# Patient Record
Sex: MALE | Race: WHITE | NOT HISPANIC OR LATINO | Employment: OTHER | URBAN - METROPOLITAN AREA
[De-identification: names, ages, dates, MRNs, and addresses within clinical notes are randomized per-mention and may not be internally consistent; named-entity substitution may affect disease eponyms.]

---

## 2023-02-27 PROBLEM — I25.10 CAD S/P PERCUTANEOUS CORONARY ANGIOPLASTY: Status: ACTIVE | Noted: 2023-02-27

## 2023-02-27 PROBLEM — I63.9 CVA (CEREBRAL VASCULAR ACCIDENT) (MULTI): Status: ACTIVE | Noted: 2023-02-27

## 2023-02-27 PROBLEM — E66.9 CLASS 1 OBESITY WITH BODY MASS INDEX (BMI) OF 32.0 TO 32.9 IN ADULT: Status: ACTIVE | Noted: 2023-02-27

## 2023-02-27 PROBLEM — I10 BENIGN ESSENTIAL HTN: Status: ACTIVE | Noted: 2023-02-27

## 2023-02-27 PROBLEM — M10.9 GOUT: Status: ACTIVE | Noted: 2023-02-27

## 2023-02-27 PROBLEM — E66.811 CLASS 1 OBESITY WITH BODY MASS INDEX (BMI) OF 32.0 TO 32.9 IN ADULT: Status: ACTIVE | Noted: 2023-02-27

## 2023-02-27 PROBLEM — Z98.61 CAD S/P PERCUTANEOUS CORONARY ANGIOPLASTY: Status: ACTIVE | Noted: 2023-02-27

## 2023-02-27 RX ORDER — ATORVASTATIN CALCIUM 80 MG/1
80 TABLET, FILM COATED ORAL NIGHTLY
COMMUNITY
End: 2023-05-25 | Stop reason: SDUPTHER

## 2023-02-27 RX ORDER — BLOOD SUGAR DIAGNOSTIC
STRIP MISCELLANEOUS
COMMUNITY
End: 2023-04-19 | Stop reason: ALTCHOICE

## 2023-02-27 RX ORDER — LISINOPRIL 20 MG/1
20 TABLET ORAL DAILY
COMMUNITY
End: 2023-04-19 | Stop reason: ALTCHOICE

## 2023-02-27 RX ORDER — METOPROLOL SUCCINATE 50 MG/1
50 TABLET, EXTENDED RELEASE ORAL DAILY
COMMUNITY
End: 2023-05-25 | Stop reason: SDUPTHER

## 2023-02-27 RX ORDER — NAPROXEN SODIUM 220 MG/1
TABLET, FILM COATED ORAL
COMMUNITY

## 2023-02-27 RX ORDER — BLOOD-GLUCOSE CONTROL, NORMAL
EACH MISCELLANEOUS
COMMUNITY
End: 2023-04-19 | Stop reason: ALTCHOICE

## 2023-03-01 LAB — CHOLESTEROL IN LDL (MG/DL) IN SER/PLAS BY DIRECT ASSAY: 65 MG/DL (ref 0–129)

## 2023-04-19 ENCOUNTER — OFFICE VISIT (OUTPATIENT)
Dept: PRIMARY CARE | Facility: CLINIC | Age: 63
End: 2023-04-19
Payer: MEDICAID

## 2023-04-19 VITALS
DIASTOLIC BLOOD PRESSURE: 87 MMHG | HEIGHT: 72 IN | OXYGEN SATURATION: 96 % | BODY MASS INDEX: 32.64 KG/M2 | SYSTOLIC BLOOD PRESSURE: 166 MMHG | HEART RATE: 75 BPM | WEIGHT: 241 LBS

## 2023-04-19 DIAGNOSIS — E66.9 CLASS 1 OBESITY WITHOUT SERIOUS COMORBIDITY WITH BODY MASS INDEX (BMI) OF 32.0 TO 32.9 IN ADULT, UNSPECIFIED OBESITY TYPE: ICD-10-CM

## 2023-04-19 DIAGNOSIS — Z98.61 CAD S/P PERCUTANEOUS CORONARY ANGIOPLASTY: ICD-10-CM

## 2023-04-19 DIAGNOSIS — M10.9 GOUT OF KNEE, UNSPECIFIED CAUSE, UNSPECIFIED CHRONICITY, UNSPECIFIED LATERALITY: ICD-10-CM

## 2023-04-19 DIAGNOSIS — E55.9 VITAMIN D DEFICIENCY: ICD-10-CM

## 2023-04-19 DIAGNOSIS — Z12.5 ENCOUNTER FOR SCREENING FOR MALIGNANT NEOPLASM OF PROSTATE: ICD-10-CM

## 2023-04-19 DIAGNOSIS — I10 BENIGN ESSENTIAL HTN: Primary | ICD-10-CM

## 2023-04-19 DIAGNOSIS — I25.10 CAD S/P PERCUTANEOUS CORONARY ANGIOPLASTY: ICD-10-CM

## 2023-04-19 DIAGNOSIS — I63.9 CEREBROVASCULAR ACCIDENT (CVA), UNSPECIFIED MECHANISM (MULTI): ICD-10-CM

## 2023-04-19 PROCEDURE — 99214 OFFICE O/P EST MOD 30 MIN: CPT | Performed by: NURSE PRACTITIONER

## 2023-04-19 PROCEDURE — 1036F TOBACCO NON-USER: CPT | Performed by: NURSE PRACTITIONER

## 2023-04-19 PROCEDURE — 3079F DIAST BP 80-89 MM HG: CPT | Performed by: NURSE PRACTITIONER

## 2023-04-19 PROCEDURE — 3008F BODY MASS INDEX DOCD: CPT | Performed by: NURSE PRACTITIONER

## 2023-04-19 PROCEDURE — 3077F SYST BP >= 140 MM HG: CPT | Performed by: NURSE PRACTITIONER

## 2023-04-19 RX ORDER — LISINOPRIL 40 MG/1
40 TABLET ORAL DAILY
COMMUNITY
Start: 2023-04-05 | End: 2023-05-03 | Stop reason: SDUPTHER

## 2023-04-19 ASSESSMENT — ENCOUNTER SYMPTOMS
VOMITING: 0
NUMBNESS: 0
COUGH: 0
PSYCHIATRIC NEGATIVE: 1
FEVER: 0
SHORTNESS OF BREATH: 0
MUSCULOSKELETAL NEGATIVE: 1
ABDOMINAL PAIN: 0
CHILLS: 0
DIARRHEA: 0
DIZZINESS: 0
HEADACHES: 0
WEAKNESS: 0
NAUSEA: 0
SORE THROAT: 0
FATIGUE: 0
CONSTIPATION: 0
PALPITATIONS: 0

## 2023-04-19 ASSESSMENT — PATIENT HEALTH QUESTIONNAIRE - PHQ9
1. LITTLE INTEREST OR PLEASURE IN DOING THINGS: NOT AT ALL
SUM OF ALL RESPONSES TO PHQ9 QUESTIONS 1 AND 2: 0
2. FEELING DOWN, DEPRESSED OR HOPELESS: NOT AT ALL

## 2023-04-19 NOTE — ASSESSMENT & PLAN NOTE
Continue lisinopril 40 mg daily  Monitor and record blood pressure at home, call office with readings consistently greater than 135/85.  Reduce dietary sodium

## 2023-04-19 NOTE — PROGRESS NOTES
Subjective   Patient ID: Jg Starr is a 62 y.o. male who presents follow up.      HPI   Patient here today for follow-up visit  Has a history of CAD with PCI and CVA.   Staying active at home doing yard work.  Denies chest pain, SOB, palpitations, dizziness,  or GI issues.  Taking all medications as prescribed.        Review of Systems   Constitutional:  Negative for chills, fatigue and fever.   HENT:  Negative for congestion, ear pain and sore throat.    Eyes:  Negative for visual disturbance.   Respiratory:  Negative for cough and shortness of breath.    Cardiovascular:  Negative for chest pain, palpitations and leg swelling.   Gastrointestinal:  Negative for abdominal pain, constipation, diarrhea, nausea and vomiting.   Genitourinary: Negative.    Musculoskeletal: Negative.    Skin:  Negative for rash.   Neurological:  Negative for dizziness, weakness, numbness and headaches.   Psychiatric/Behavioral: Negative.         Objective   /87   Pulse 75   Ht 1.829 m (6')   Wt 109 kg (241 lb)   SpO2 96%   BMI 32.69 kg/m²     Physical Exam  Constitutional:       General: He is not in acute distress.     Appearance: Normal appearance.   HENT:      Head: Normocephalic and atraumatic.      Right Ear: Tympanic membrane, ear canal and external ear normal.      Left Ear: Tympanic membrane, ear canal and external ear normal.      Nose: Nose normal.      Mouth/Throat:      Mouth: Mucous membranes are moist.      Pharynx: Oropharynx is clear.   Eyes:      Extraocular Movements: Extraocular movements intact.      Conjunctiva/sclera: Conjunctivae normal.      Pupils: Pupils are equal, round, and reactive to light.   Cardiovascular:      Rate and Rhythm: Normal rate and regular rhythm.      Pulses: Normal pulses.      Heart sounds: Normal heart sounds. No murmur heard.  Pulmonary:      Effort: Pulmonary effort is normal.      Breath sounds: Normal breath sounds. No wheezing, rhonchi or rales.   Abdominal:       General: Bowel sounds are normal.      Palpations: Abdomen is soft.      Tenderness: There is no abdominal tenderness.   Musculoskeletal:         General: Normal range of motion.      Cervical back: Normal range of motion and neck supple.   Lymphadenopathy:      Comments: No lymphadenopathy noted   Skin:     General: Skin is warm and dry.      Findings: No rash.   Neurological:      General: No focal deficit present.      Mental Status: He is alert and oriented to person, place, and time.      Cranial Nerves: No cranial nerve deficit.      Coordination: Coordination normal.      Gait: Gait normal.   Psychiatric:         Mood and Affect: Mood normal.         Behavior: Behavior normal.         Assessment/Plan   Problem List Items Addressed This Visit          Nervous    CVA (cerebral vascular accident) (CMS/[facility])       Circulatory    Benign essential HTN - Primary     Continue lisinopril 40 mg daily  Monitor and record blood pressure at home, call office with readings consistently greater than 135/85.  Reduce dietary sodium         Relevant Orders    Comprehensive Metabolic Panel    TSH with reflex to Free T4 if abnormal    CAD S/P percutaneous coronary angioplasty     Continue daily aspirin indefinitely  Continue high intensity statin indefinitely  Continue beta-blocker as prescribed, Toprol XL 50 mg daily         Relevant Orders    Comprehensive Metabolic Panel    TSH with reflex to Free T4 if abnormal    Lipid Panel    CBC and Auto Differential       Endocrine/Metabolic    Class 1 obesity with body mass index (BMI) of 32.0 to 32.9 in adult    Relevant Orders    TSH with reflex to Free T4 if abnormal       Other    Gout     Other Visit Diagnoses       Vitamin D deficiency        Relevant Orders    Vitamin D, Total    Encounter for screening for malignant neoplasm of prostate        Relevant Orders    Prostate Specific Antigen, Screen

## 2023-04-19 NOTE — ASSESSMENT & PLAN NOTE
Continue daily aspirin indefinitely  Continue high intensity statin indefinitely  Continue beta-blocker as prescribed, Toprol XL 50 mg daily

## 2023-05-03 DIAGNOSIS — I10 BENIGN ESSENTIAL HTN: ICD-10-CM

## 2023-05-03 RX ORDER — LISINOPRIL 40 MG/1
40 TABLET ORAL DAILY
Qty: 90 TABLET | Refills: 2 | Status: SHIPPED | OUTPATIENT
Start: 2023-05-03 | End: 2023-08-02 | Stop reason: SDUPTHER

## 2023-05-25 DIAGNOSIS — I10 BENIGN ESSENTIAL HTN: ICD-10-CM

## 2023-05-25 DIAGNOSIS — M10.9 GOUT OF KNEE, UNSPECIFIED CAUSE, UNSPECIFIED CHRONICITY, UNSPECIFIED LATERALITY: ICD-10-CM

## 2023-05-25 RX ORDER — ATORVASTATIN CALCIUM 80 MG/1
80 TABLET, FILM COATED ORAL NIGHTLY
Qty: 90 TABLET | Refills: 0 | Status: SHIPPED | OUTPATIENT
Start: 2023-05-25 | End: 2023-08-28

## 2023-05-25 RX ORDER — METOPROLOL SUCCINATE 50 MG/1
50 TABLET, EXTENDED RELEASE ORAL DAILY
Qty: 90 TABLET | Refills: 0 | Status: SHIPPED | OUTPATIENT
Start: 2023-05-25 | End: 2023-08-28

## 2023-05-25 NOTE — TELEPHONE ENCOUNTER
Requested Prescriptions     Pending Prescriptions Disp Refills    atorvastatin (Lipitor) 80 mg tablet 90 tablet 0     Sig: Take 1 tablet (80 mg) by mouth once daily at bedtime.    metoprolol succinate XL (Toprol-XL) 50 mg 24 hr tablet 90 tablet 0     Sig: Take 1 tablet (50 mg) by mouth once daily.

## 2023-08-02 DIAGNOSIS — I10 BENIGN ESSENTIAL HTN: ICD-10-CM

## 2023-08-02 RX ORDER — LISINOPRIL 40 MG/1
40 TABLET ORAL DAILY
Qty: 90 TABLET | Refills: 0 | Status: SHIPPED | OUTPATIENT
Start: 2023-08-02 | End: 2023-10-25 | Stop reason: SDUPTHER

## 2023-08-25 DIAGNOSIS — M10.9 GOUT OF KNEE, UNSPECIFIED CAUSE, UNSPECIFIED CHRONICITY, UNSPECIFIED LATERALITY: ICD-10-CM

## 2023-08-25 DIAGNOSIS — I10 BENIGN ESSENTIAL HTN: ICD-10-CM

## 2023-08-26 PROBLEM — I25.5 ISCHEMIC CARDIOMYOPATHY: Status: ACTIVE | Noted: 2023-08-26

## 2023-08-26 PROBLEM — I67.1 ANEURYSM OF MIDDLE CEREBRAL ARTERY (HHS-HCC): Status: ACTIVE | Noted: 2023-08-26

## 2023-08-26 PROBLEM — I63.512 LEFT MIDDLE CEREBRAL ARTERY STROKE (MULTI): Status: ACTIVE | Noted: 2023-08-26

## 2023-08-28 RX ORDER — ATORVASTATIN CALCIUM 80 MG/1
80 TABLET, FILM COATED ORAL NIGHTLY
Qty: 90 TABLET | Refills: 0 | Status: SHIPPED | OUTPATIENT
Start: 2023-08-28 | End: 2023-10-25 | Stop reason: SDUPTHER

## 2023-08-28 RX ORDER — METOPROLOL SUCCINATE 50 MG/1
50 TABLET, EXTENDED RELEASE ORAL DAILY
Qty: 90 TABLET | Refills: 0 | Status: SHIPPED | OUTPATIENT
Start: 2023-08-28 | End: 2023-10-25 | Stop reason: SDUPTHER

## 2023-10-04 ENCOUNTER — APPOINTMENT (OUTPATIENT)
Dept: CARDIOLOGY | Facility: HOSPITAL | Age: 63
End: 2023-10-04
Payer: MEDICAID

## 2023-10-25 ENCOUNTER — OFFICE VISIT (OUTPATIENT)
Dept: PRIMARY CARE | Facility: CLINIC | Age: 63
End: 2023-10-25
Payer: MEDICAID

## 2023-10-25 VITALS
WEIGHT: 278 LBS | BODY MASS INDEX: 37.65 KG/M2 | SYSTOLIC BLOOD PRESSURE: 142 MMHG | OXYGEN SATURATION: 93 % | DIASTOLIC BLOOD PRESSURE: 86 MMHG | HEART RATE: 80 BPM | HEIGHT: 72 IN

## 2023-10-25 DIAGNOSIS — I10 BENIGN ESSENTIAL HTN: Primary | ICD-10-CM

## 2023-10-25 DIAGNOSIS — Z98.61 CAD S/P PERCUTANEOUS CORONARY ANGIOPLASTY: ICD-10-CM

## 2023-10-25 DIAGNOSIS — I67.1 CEREBRAL ANEURYSM, NONRUPTURED (HHS-HCC): ICD-10-CM

## 2023-10-25 DIAGNOSIS — R73.03 PREDIABETES: ICD-10-CM

## 2023-10-25 DIAGNOSIS — M10.9 GOUT OF KNEE, UNSPECIFIED CAUSE, UNSPECIFIED CHRONICITY, UNSPECIFIED LATERALITY: ICD-10-CM

## 2023-10-25 DIAGNOSIS — I25.10 CAD S/P PERCUTANEOUS CORONARY ANGIOPLASTY: ICD-10-CM

## 2023-10-25 PROBLEM — E11.9 CONTROLLED TYPE 2 DIABETES MELLITUS WITHOUT COMPLICATION, WITHOUT LONG-TERM CURRENT USE OF INSULIN (MULTI): Status: ACTIVE | Noted: 2022-04-11

## 2023-10-25 PROCEDURE — 99214 OFFICE O/P EST MOD 30 MIN: CPT | Performed by: NURSE PRACTITIONER

## 2023-10-25 PROCEDURE — 3079F DIAST BP 80-89 MM HG: CPT | Performed by: NURSE PRACTITIONER

## 2023-10-25 PROCEDURE — 4010F ACE/ARB THERAPY RXD/TAKEN: CPT | Performed by: NURSE PRACTITIONER

## 2023-10-25 PROCEDURE — 1036F TOBACCO NON-USER: CPT | Performed by: NURSE PRACTITIONER

## 2023-10-25 PROCEDURE — 3077F SYST BP >= 140 MM HG: CPT | Performed by: NURSE PRACTITIONER

## 2023-10-25 PROCEDURE — 3008F BODY MASS INDEX DOCD: CPT | Performed by: NURSE PRACTITIONER

## 2023-10-25 RX ORDER — ATORVASTATIN CALCIUM 80 MG/1
80 TABLET, FILM COATED ORAL NIGHTLY
Qty: 90 TABLET | Refills: 1 | Status: SHIPPED | OUTPATIENT
Start: 2023-10-25 | End: 2024-04-25 | Stop reason: SDUPTHER

## 2023-10-25 RX ORDER — LISINOPRIL 40 MG/1
40 TABLET ORAL DAILY
Qty: 90 TABLET | Refills: 1 | Status: SHIPPED | OUTPATIENT
Start: 2023-10-25 | End: 2024-04-25 | Stop reason: SDUPTHER

## 2023-10-25 RX ORDER — METOPROLOL SUCCINATE 50 MG/1
50 TABLET, EXTENDED RELEASE ORAL DAILY
Qty: 90 TABLET | Refills: 1 | Status: SHIPPED | OUTPATIENT
Start: 2023-10-25 | End: 2024-04-25 | Stop reason: SDUPTHER

## 2023-10-25 ASSESSMENT — ENCOUNTER SYMPTOMS
COUGH: 0
WEAKNESS: 0
ABDOMINAL PAIN: 0
HEADACHES: 0
PALPITATIONS: 0
FATIGUE: 0
SHORTNESS OF BREATH: 0
NUMBNESS: 0
CONSTIPATION: 0
ARTHRALGIAS: 1
NAUSEA: 0
DIZZINESS: 0
PSYCHIATRIC NEGATIVE: 1
VOMITING: 0
SORE THROAT: 0
FEVER: 0
CHILLS: 0
DIARRHEA: 0

## 2023-10-25 ASSESSMENT — PATIENT HEALTH QUESTIONNAIRE - PHQ9
SUM OF ALL RESPONSES TO PHQ9 QUESTIONS 1 AND 2: 0
2. FEELING DOWN, DEPRESSED OR HOPELESS: NOT AT ALL
1. LITTLE INTEREST OR PLEASURE IN DOING THINGS: NOT AT ALL

## 2023-10-25 NOTE — ASSESSMENT & PLAN NOTE
Continue daily aspirin indefinitely  Continue high intensity statin indefinitely  Continue beta-blocker as prescribed, Toprol XL 50 mg daily  Follow-up with cardiology as scheduled

## 2023-10-25 NOTE — ASSESSMENT & PLAN NOTE
Continue to follow gout friendly diet.  May take Naprosyn or ibuprofen, NSAID for gout flareup as needed.  Call office if OTC medications do not control symptoms.

## 2023-10-25 NOTE — ASSESSMENT & PLAN NOTE
Patient does not seek regular treatment for this and does not want to monitored at this time.  No issues.

## 2023-10-25 NOTE — PROGRESS NOTES
Subjective   Patient ID: Jg Starr is a 63 y.o. male who presents for follow up    HPI   History of gout in bilateral knees. Asking if there is something he can take for flares up.  Has not had one in years but does not know how to treat it.  Denies chest pain, SOB, palpitations, dizziness,  or GI issues.  Having issues with arthritis in hands.  Takes Tylenol for pain.  BP always elevated is office.  Repeat 142/86.  Taking medications as prescribed       Review of Systems   Constitutional:  Negative for chills, fatigue and fever.   HENT:  Negative for congestion, ear pain and sore throat.    Eyes:  Negative for visual disturbance.   Respiratory:  Negative for cough and shortness of breath.    Cardiovascular:  Negative for chest pain, palpitations and leg swelling.   Gastrointestinal:  Negative for abdominal pain, constipation, diarrhea, nausea and vomiting.   Genitourinary: Negative.    Musculoskeletal:  Positive for arthralgias.   Skin:  Negative for rash.   Neurological:  Negative for dizziness, weakness, numbness and headaches.   Psychiatric/Behavioral: Negative.         Objective   /86   Pulse 80   Ht 1.829 m (6')   Wt 126 kg (278 lb)   SpO2 93%   BMI 37.70 kg/m²     Physical Exam  Constitutional:       General: He is not in acute distress.     Appearance: Normal appearance. He is well-developed.   HENT:      Head: Normocephalic and atraumatic.      Right Ear: Tympanic membrane, ear canal and external ear normal.      Left Ear: Tympanic membrane, ear canal and external ear normal.      Nose: Nose normal.      Mouth/Throat:      Mouth: Mucous membranes are moist.      Pharynx: Oropharynx is clear.   Eyes:      Extraocular Movements: Extraocular movements intact.      Conjunctiva/sclera: Conjunctivae normal.      Pupils: Pupils are equal, round, and reactive to light.   Cardiovascular:      Rate and Rhythm: Normal rate and regular rhythm.      Pulses: Normal pulses.      Heart sounds: Normal  heart sounds. No murmur heard.  Pulmonary:      Effort: Pulmonary effort is normal.      Breath sounds: Normal breath sounds. No wheezing, rhonchi or rales.   Abdominal:      General: Bowel sounds are normal.      Palpations: Abdomen is soft.      Tenderness: There is no abdominal tenderness.   Musculoskeletal:         General: Normal range of motion.      Cervical back: Normal range of motion and neck supple.   Lymphadenopathy:      Comments: No lymphadenopathy noted   Skin:     General: Skin is warm and dry.      Findings: No rash.   Neurological:      General: No focal deficit present.      Mental Status: He is alert and oriented to person, place, and time.      Cranial Nerves: No cranial nerve deficit.      Coordination: Coordination normal.      Gait: Gait normal.   Psychiatric:         Mood and Affect: Mood normal.         Behavior: Behavior normal.         Assessment/Plan   Problem List Items Addressed This Visit             ICD-10-CM    Benign essential HTN - Primary I10     Continue lisinopril 40 mg daily  Monitor and record blood pressure at home, call office with readings consistently greater than 135/85.  Reduce dietary sodium         Relevant Medications    atorvastatin (Lipitor) 80 mg tablet    lisinopril 40 mg tablet    metoprolol succinate XL (Toprol-XL) 50 mg 24 hr tablet    CAD S/P percutaneous coronary angioplasty I25.10, Z98.61     Continue daily aspirin indefinitely  Continue high intensity statin indefinitely  Continue beta-blocker as prescribed, Toprol XL 50 mg daily  Follow-up with cardiology as scheduled         Relevant Medications    metoprolol succinate XL (Toprol-XL) 50 mg 24 hr tablet    Gout M10.9     Continue to follow gout friendly diet.  May take Naprosyn or ibuprofen, NSAID for gout flareup as needed.  Call office if OTC medications do not control symptoms.         Relevant Medications    atorvastatin (Lipitor) 80 mg tablet    metoprolol succinate XL (Toprol-XL) 50 mg 24 hr tablet     Prediabetes R73.03    Relevant Orders    Hemoglobin A1C     Other Visit Diagnoses         Codes    Cerebral aneurysm, nonruptured     I67.1        Refuses screenings at this time, PSA included in labs  Have blood drawn that was not drawn at last visit.  Include A1c  Follow up in 3 months to address A1c if elevated, otherwise can go 6 months.

## 2023-12-08 ENCOUNTER — APPOINTMENT (OUTPATIENT)
Dept: RADIOLOGY | Facility: HOSPITAL | Age: 63
End: 2023-12-08
Payer: MEDICAID

## 2023-12-08 ENCOUNTER — HOSPITAL ENCOUNTER (INPATIENT)
Facility: HOSPITAL | Age: 63
LOS: 11 days | Discharge: NURSING, REHAB OR LONG-TERM CARE | End: 2023-12-19
Attending: STUDENT IN AN ORGANIZED HEALTH CARE EDUCATION/TRAINING PROGRAM | Admitting: INTERNAL MEDICINE
Payer: MEDICAID

## 2023-12-08 DIAGNOSIS — R56.9 SEIZURE (MULTI): Primary | ICD-10-CM

## 2023-12-08 DIAGNOSIS — R41.82 ALTERED MENTAL STATUS, UNSPECIFIED ALTERED MENTAL STATUS TYPE: ICD-10-CM

## 2023-12-08 DIAGNOSIS — I25.5 ISCHEMIC CARDIOMYOPATHY: ICD-10-CM

## 2023-12-08 DIAGNOSIS — I16.1 HYPERTENSIVE EMERGENCY: ICD-10-CM

## 2023-12-08 DIAGNOSIS — M10.9 ACUTE GOUT OF LEFT KNEE, UNSPECIFIED CAUSE: ICD-10-CM

## 2023-12-08 DIAGNOSIS — I67.1 SACCULAR ANEURYSM (HHS-HCC): ICD-10-CM

## 2023-12-08 DIAGNOSIS — R33.8 ACUTE URINARY RETENTION: ICD-10-CM

## 2023-12-08 DIAGNOSIS — R56.9 SEIZURE-LIKE ACTIVITY (MULTI): ICD-10-CM

## 2023-12-08 DIAGNOSIS — I63.9 CEREBROVASCULAR ACCIDENT (CVA), UNSPECIFIED MECHANISM (MULTI): ICD-10-CM

## 2023-12-08 DIAGNOSIS — U07.1 COVID-19: ICD-10-CM

## 2023-12-08 DIAGNOSIS — H91.93 HEARING DECREASED, BILATERAL: ICD-10-CM

## 2023-12-08 LAB
ALBUMIN SERPL BCP-MCNC: 4.3 G/DL (ref 3.4–5)
ALP SERPL-CCNC: 66 U/L (ref 33–136)
ALT SERPL W P-5'-P-CCNC: 18 U/L (ref 10–52)
ANION GAP BLDV CALCULATED.4IONS-SCNC: 9 MMOL/L (ref 10–25)
ANION GAP SERPL CALC-SCNC: 14 MMOL/L (ref 10–20)
AST SERPL W P-5'-P-CCNC: 17 U/L (ref 9–39)
BASE EXCESS BLDV CALC-SCNC: -0.6 MMOL/L (ref -2–3)
BASOPHILS # BLD AUTO: 0.05 X10*3/UL (ref 0–0.1)
BASOPHILS NFR BLD AUTO: 0.5 %
BILIRUB SERPL-MCNC: 0.8 MG/DL (ref 0–1.2)
BNP SERPL-MCNC: 153 PG/ML (ref 0–99)
BODY TEMPERATURE: ABNORMAL
BUN SERPL-MCNC: 16 MG/DL (ref 6–23)
CA-I BLDV-SCNC: 1.17 MMOL/L (ref 1.1–1.33)
CALCIUM SERPL-MCNC: 8.9 MG/DL (ref 8.6–10.3)
CARDIAC TROPONIN I PNL SERPL HS: 12 NG/L (ref 0–20)
CHLORIDE BLDV-SCNC: 99 MMOL/L (ref 98–107)
CHLORIDE SERPL-SCNC: 99 MMOL/L (ref 98–107)
CO2 SERPL-SCNC: 27 MMOL/L (ref 21–32)
CREAT SERPL-MCNC: 0.92 MG/DL (ref 0.5–1.3)
EOSINOPHIL # BLD AUTO: 0.25 X10*3/UL (ref 0–0.7)
EOSINOPHIL NFR BLD AUTO: 2.4 %
ERYTHROCYTE [DISTWIDTH] IN BLOOD BY AUTOMATED COUNT: 13.1 % (ref 11.5–14.5)
ETHANOL SERPL-MCNC: <10 MG/DL
GFR SERPL CREATININE-BSD FRML MDRD: >90 ML/MIN/1.73M*2
GLUCOSE BLD MANUAL STRIP-MCNC: 161 MG/DL (ref 74–99)
GLUCOSE BLDV-MCNC: 170 MG/DL (ref 74–99)
GLUCOSE SERPL-MCNC: 155 MG/DL (ref 74–99)
HCO3 BLDV-SCNC: 29.4 MMOL/L (ref 22–26)
HCT VFR BLD AUTO: 47.1 % (ref 41–52)
HCT VFR BLD EST: 60 % (ref 41–52)
HGB BLD-MCNC: 14.9 G/DL (ref 13.5–17.5)
HGB BLDV-MCNC: 20 G/DL (ref 13.5–17.5)
IMM GRANULOCYTES # BLD AUTO: 0.08 X10*3/UL (ref 0–0.7)
IMM GRANULOCYTES NFR BLD AUTO: 0.8 % (ref 0–0.9)
INHALED O2 CONCENTRATION: 21 %
INR PPP: 1.1 (ref 0.9–1.1)
LACTATE BLDV-SCNC: 2.5 MMOL/L (ref 0.4–2)
LYMPHOCYTES # BLD AUTO: 0.77 X10*3/UL (ref 1.2–4.8)
LYMPHOCYTES NFR BLD AUTO: 7.3 %
MAGNESIUM SERPL-MCNC: 1.88 MG/DL (ref 1.6–2.4)
MCH RBC QN AUTO: 26.3 PG (ref 26–34)
MCHC RBC AUTO-ENTMCNC: 31.6 G/DL (ref 32–36)
MCV RBC AUTO: 83 FL (ref 80–100)
MONOCYTES # BLD AUTO: 0.96 X10*3/UL (ref 0.1–1)
MONOCYTES NFR BLD AUTO: 9.1 %
NEUTROPHILS # BLD AUTO: 8.46 X10*3/UL (ref 1.2–7.7)
NEUTROPHILS NFR BLD AUTO: 79.9 %
NRBC BLD-RTO: 0 /100 WBCS (ref 0–0)
OXYHGB MFR BLDV: 82.9 % (ref 45–75)
PCO2 BLDV: 67 MM HG (ref 41–51)
PH BLDV: 7.25 PH (ref 7.33–7.43)
PLATELET # BLD AUTO: 237 X10*3/UL (ref 150–450)
PO2 BLDV: 65 MM HG (ref 35–45)
POTASSIUM BLDV-SCNC: 3.9 MMOL/L (ref 3.5–5.3)
POTASSIUM SERPL-SCNC: 3.9 MMOL/L (ref 3.5–5.3)
PROT SERPL-MCNC: 7.7 G/DL (ref 6.4–8.2)
PROTHROMBIN TIME: 12.3 SECONDS (ref 9.8–12.8)
RBC # BLD AUTO: 5.67 X10*6/UL (ref 4.5–5.9)
SAO2 % BLDV: 85 % (ref 45–75)
SARS-COV-2 RNA RESP QL NAA+PROBE: DETECTED
SODIUM BLDV-SCNC: 133 MMOL/L (ref 136–145)
SODIUM SERPL-SCNC: 136 MMOL/L (ref 136–145)
WBC # BLD AUTO: 10.6 X10*3/UL (ref 4.4–11.3)

## 2023-12-08 PROCEDURE — 85610 PROTHROMBIN TIME: CPT | Performed by: STUDENT IN AN ORGANIZED HEALTH CARE EDUCATION/TRAINING PROGRAM

## 2023-12-08 PROCEDURE — 71250 CT THORAX DX C-: CPT | Performed by: STUDENT IN AN ORGANIZED HEALTH CARE EDUCATION/TRAINING PROGRAM

## 2023-12-08 PROCEDURE — 74176 CT ABD & PELVIS W/O CONTRAST: CPT

## 2023-12-08 PROCEDURE — 70450 CT HEAD/BRAIN W/O DYE: CPT

## 2023-12-08 PROCEDURE — 2500000004 HC RX 250 GENERAL PHARMACY W/ HCPCS (ALT 636 FOR OP/ED): Performed by: STUDENT IN AN ORGANIZED HEALTH CARE EDUCATION/TRAINING PROGRAM

## 2023-12-08 PROCEDURE — 2500000004 HC RX 250 GENERAL PHARMACY W/ HCPCS (ALT 636 FOR OP/ED)

## 2023-12-08 PROCEDURE — 87040 BLOOD CULTURE FOR BACTERIA: CPT | Mod: GEALAB | Performed by: STUDENT IN AN ORGANIZED HEALTH CARE EDUCATION/TRAINING PROGRAM

## 2023-12-08 PROCEDURE — 70498 CT ANGIOGRAPHY NECK: CPT

## 2023-12-08 PROCEDURE — 70450 CT HEAD/BRAIN W/O DYE: CPT | Performed by: RADIOLOGY

## 2023-12-08 PROCEDURE — 82947 ASSAY GLUCOSE BLOOD QUANT: CPT

## 2023-12-08 PROCEDURE — 84484 ASSAY OF TROPONIN QUANT: CPT | Performed by: STUDENT IN AN ORGANIZED HEALTH CARE EDUCATION/TRAINING PROGRAM

## 2023-12-08 PROCEDURE — 87635 SARS-COV-2 COVID-19 AMP PRB: CPT | Performed by: STUDENT IN AN ORGANIZED HEALTH CARE EDUCATION/TRAINING PROGRAM

## 2023-12-08 PROCEDURE — 80053 COMPREHEN METABOLIC PANEL: CPT | Performed by: STUDENT IN AN ORGANIZED HEALTH CARE EDUCATION/TRAINING PROGRAM

## 2023-12-08 PROCEDURE — 83735 ASSAY OF MAGNESIUM: CPT | Performed by: STUDENT IN AN ORGANIZED HEALTH CARE EDUCATION/TRAINING PROGRAM

## 2023-12-08 PROCEDURE — 74176 CT ABD & PELVIS W/O CONTRAST: CPT | Performed by: STUDENT IN AN ORGANIZED HEALTH CARE EDUCATION/TRAINING PROGRAM

## 2023-12-08 PROCEDURE — 70498 CT ANGIOGRAPHY NECK: CPT | Performed by: STUDENT IN AN ORGANIZED HEALTH CARE EDUCATION/TRAINING PROGRAM

## 2023-12-08 PROCEDURE — 36600 WITHDRAWAL OF ARTERIAL BLOOD: CPT

## 2023-12-08 PROCEDURE — 2550000001 HC RX 255 CONTRASTS: Performed by: STUDENT IN AN ORGANIZED HEALTH CARE EDUCATION/TRAINING PROGRAM

## 2023-12-08 PROCEDURE — 36415 COLL VENOUS BLD VENIPUNCTURE: CPT | Performed by: STUDENT IN AN ORGANIZED HEALTH CARE EDUCATION/TRAINING PROGRAM

## 2023-12-08 PROCEDURE — 99291 CRITICAL CARE FIRST HOUR: CPT | Mod: 25 | Performed by: STUDENT IN AN ORGANIZED HEALTH CARE EDUCATION/TRAINING PROGRAM

## 2023-12-08 PROCEDURE — 96374 THER/PROPH/DIAG INJ IV PUSH: CPT

## 2023-12-08 PROCEDURE — 84132 ASSAY OF SERUM POTASSIUM: CPT | Performed by: STUDENT IN AN ORGANIZED HEALTH CARE EDUCATION/TRAINING PROGRAM

## 2023-12-08 PROCEDURE — 85025 COMPLETE CBC W/AUTO DIFF WBC: CPT | Performed by: STUDENT IN AN ORGANIZED HEALTH CARE EDUCATION/TRAINING PROGRAM

## 2023-12-08 PROCEDURE — 82077 ASSAY SPEC XCP UR&BREATH IA: CPT | Performed by: STUDENT IN AN ORGANIZED HEALTH CARE EDUCATION/TRAINING PROGRAM

## 2023-12-08 PROCEDURE — 83880 ASSAY OF NATRIURETIC PEPTIDE: CPT | Performed by: STUDENT IN AN ORGANIZED HEALTH CARE EDUCATION/TRAINING PROGRAM

## 2023-12-08 PROCEDURE — 70496 CT ANGIOGRAPHY HEAD: CPT | Performed by: STUDENT IN AN ORGANIZED HEALTH CARE EDUCATION/TRAINING PROGRAM

## 2023-12-08 PROCEDURE — 96375 TX/PRO/DX INJ NEW DRUG ADDON: CPT

## 2023-12-08 PROCEDURE — 84132 ASSAY OF SERUM POTASSIUM: CPT | Performed by: INTERNAL MEDICINE

## 2023-12-08 PROCEDURE — 1100000001 HC PRIVATE ROOM DAILY

## 2023-12-08 RX ORDER — LABETALOL HYDROCHLORIDE 5 MG/ML
5 INJECTION, SOLUTION INTRAVENOUS ONCE
Status: COMPLETED | OUTPATIENT
Start: 2023-12-08 | End: 2023-12-08

## 2023-12-08 RX ORDER — LEVETIRACETAM 10 MG/ML
1000 INJECTION INTRAVASCULAR EVERY 12 HOURS
Status: DISCONTINUED | OUTPATIENT
Start: 2023-12-08 | End: 2023-12-10

## 2023-12-08 RX ORDER — LABETALOL HYDROCHLORIDE 5 MG/ML
10 INJECTION, SOLUTION INTRAVENOUS ONCE
Status: DISCONTINUED | OUTPATIENT
Start: 2023-12-08 | End: 2023-12-08

## 2023-12-08 RX ORDER — LABETALOL HYDROCHLORIDE 5 MG/ML
5 INJECTION, SOLUTION INTRAVENOUS ONCE
Status: DISCONTINUED | OUTPATIENT
Start: 2023-12-08 | End: 2023-12-08

## 2023-12-08 RX ORDER — LORAZEPAM 2 MG/ML
INJECTION INTRAMUSCULAR
Status: COMPLETED
Start: 2023-12-08 | End: 2023-12-08

## 2023-12-08 RX ORDER — LORAZEPAM 2 MG/ML
2 INJECTION INTRAMUSCULAR ONCE
Status: COMPLETED | OUTPATIENT
Start: 2023-12-08 | End: 2023-12-08

## 2023-12-08 RX ADMIN — LABETALOL HYDROCHLORIDE 5 MG: 5 INJECTION, SOLUTION INTRAVENOUS at 20:32

## 2023-12-08 RX ADMIN — LEVETIRACETAM 1000 MG: 10 INJECTION INTRAVENOUS at 19:30

## 2023-12-08 RX ADMIN — LORAZEPAM 2 MG: 2 INJECTION INTRAMUSCULAR; INTRAVENOUS at 19:24

## 2023-12-08 RX ADMIN — LORAZEPAM 2 MG: 2 INJECTION INTRAMUSCULAR at 19:24

## 2023-12-08 RX ADMIN — IOHEXOL 85 ML: 350 INJECTION, SOLUTION INTRAVENOUS at 19:26

## 2023-12-08 SDOH — SOCIAL STABILITY: SOCIAL INSECURITY: DO YOU FEEL UNSAFE GOING BACK TO THE PLACE WHERE YOU ARE LIVING?: UNABLE TO ASSESS

## 2023-12-08 SDOH — SOCIAL STABILITY: SOCIAL INSECURITY: ARE THERE ANY APPARENT SIGNS OF INJURIES/BEHAVIORS THAT COULD BE RELATED TO ABUSE/NEGLECT?: UNABLE TO ASSESS

## 2023-12-08 SDOH — SOCIAL STABILITY: SOCIAL INSECURITY: DO YOU FEEL ANYONE HAS EXPLOITED OR TAKEN ADVANTAGE OF YOU FINANCIALLY OR OF YOUR PERSONAL PROPERTY?: UNABLE TO ASSESS

## 2023-12-08 SDOH — SOCIAL STABILITY: SOCIAL INSECURITY: DOES ANYONE TRY TO KEEP YOU FROM HAVING/CONTACTING OTHER FRIENDS OR DOING THINGS OUTSIDE YOUR HOME?: UNABLE TO ASSESS

## 2023-12-08 SDOH — SOCIAL STABILITY: SOCIAL INSECURITY: HAS ANYONE EVER THREATENED TO HURT YOUR FAMILY OR YOUR PETS?: UNABLE TO ASSESS

## 2023-12-08 SDOH — SOCIAL STABILITY: SOCIAL INSECURITY: ABUSE: ADULT

## 2023-12-08 SDOH — SOCIAL STABILITY: SOCIAL INSECURITY: ARE YOU OR HAVE YOU BEEN THREATENED OR ABUSED PHYSICALLY, EMOTIONALLY, OR SEXUALLY BY ANYONE?: UNABLE TO ASSESS

## 2023-12-08 SDOH — SOCIAL STABILITY: SOCIAL INSECURITY: WERE YOU ABLE TO COMPLETE ALL THE BEHAVIORAL HEALTH SCREENINGS?: NO

## 2023-12-08 SDOH — SOCIAL STABILITY: SOCIAL INSECURITY: HAVE YOU HAD THOUGHTS OF HARMING ANYONE ELSE?: UNABLE TO ASSESS

## 2023-12-08 ASSESSMENT — ACTIVITIES OF DAILY LIVING (ADL)
LACK_OF_TRANSPORTATION: PATIENT DECLINED
DRESSING YOURSELF: UNABLE TO ASSESS
FEEDING YOURSELF: UNABLE TO ASSESS
WALKS IN HOME: UNABLE TO ASSESS
HEARING - LEFT EAR: UNABLE TO ASSESS
TOILETING: UNABLE TO ASSESS
JUDGMENT_ADEQUATE_SAFELY_COMPLETE_DAILY_ACTIVITIES: UNABLE TO ASSESS
GROOMING: UNABLE TO ASSESS
HEARING - RIGHT EAR: UNABLE TO ASSESS
PATIENT'S MEMORY ADEQUATE TO SAFELY COMPLETE DAILY ACTIVITIES?: UNABLE TO ASSESS
ASSISTIVE_DEVICE: OTHER (COMMENT)
BATHING: UNABLE TO ASSESS
ADEQUATE_TO_COMPLETE_ADL: UNABLE TO ASSESS

## 2023-12-08 ASSESSMENT — LIFESTYLE VARIABLES
EVER FELT BAD OR GUILTY ABOUT YOUR DRINKING: NO
REASON UNABLE TO ASSESS: YES
HAVE YOU EVER FELT YOU SHOULD CUT DOWN ON YOUR DRINKING: NO
HAVE PEOPLE ANNOYED YOU BY CRITICIZING YOUR DRINKING: NO
EVER HAD A DRINK FIRST THING IN THE MORNING TO STEADY YOUR NERVES TO GET RID OF A HANGOVER: NO

## 2023-12-08 ASSESSMENT — COGNITIVE AND FUNCTIONAL STATUS - GENERAL
WALKING IN HOSPITAL ROOM: TOTAL
MOVING FROM LYING ON BACK TO SITTING ON SIDE OF FLAT BED WITH BEDRAILS: TOTAL
TURNING FROM BACK TO SIDE WHILE IN FLAT BAD: TOTAL
TOILETING: TOTAL
MOBILITY SCORE: 6
HELP NEEDED FOR BATHING: TOTAL
PATIENT BASELINE BEDBOUND: UNABLE TO ASSESS AT THIS TIME
DRESSING REGULAR LOWER BODY CLOTHING: TOTAL
CLIMB 3 TO 5 STEPS WITH RAILING: TOTAL
PERSONAL GROOMING: TOTAL
STANDING UP FROM CHAIR USING ARMS: TOTAL
MOVING TO AND FROM BED TO CHAIR: TOTAL
EATING MEALS: TOTAL
DRESSING REGULAR UPPER BODY CLOTHING: TOTAL
DAILY ACTIVITIY SCORE: 6

## 2023-12-08 ASSESSMENT — PAIN SCALES - WONG BAKER
WONGBAKER_NUMERICALRESPONSE: NO HURT
WONGBAKER_NUMERICALRESPONSE: NO HURT

## 2023-12-08 ASSESSMENT — PAIN DESCRIPTION - PROGRESSION: CLINICAL_PROGRESSION: NOT CHANGED

## 2023-12-08 ASSESSMENT — COLUMBIA-SUICIDE SEVERITY RATING SCALE - C-SSRS
2. HAVE YOU ACTUALLY HAD ANY THOUGHTS OF KILLING YOURSELF?: NO
6. HAVE YOU EVER DONE ANYTHING, STARTED TO DO ANYTHING, OR PREPARED TO DO ANYTHING TO END YOUR LIFE?: NO
1. IN THE PAST MONTH, HAVE YOU WISHED YOU WERE DEAD OR WISHED YOU COULD GO TO SLEEP AND NOT WAKE UP?: NO

## 2023-12-08 ASSESSMENT — PAIN - FUNCTIONAL ASSESSMENT
PAIN_FUNCTIONAL_ASSESSMENT: WONG-BAKER FACES
PAIN_FUNCTIONAL_ASSESSMENT: WONG-BAKER FACES
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
## 2023-12-09 LAB
ALBUMIN SERPL BCP-MCNC: 4.1 G/DL (ref 3.4–5)
ALP SERPL-CCNC: 63 U/L (ref 33–136)
ALT SERPL W P-5'-P-CCNC: 18 U/L (ref 10–52)
ANION GAP BLDA CALCULATED.4IONS-SCNC: 11 MMO/L (ref 10–25)
ANION GAP BLDA CALCULATED.4IONS-SCNC: 9 MMO/L (ref 10–25)
ANION GAP SERPL CALC-SCNC: 14 MMOL/L (ref 10–20)
AST SERPL W P-5'-P-CCNC: 14 U/L (ref 9–39)
BASE EXCESS BLDA CALC-SCNC: 1.6 MMOL/L (ref -2–3)
BASE EXCESS BLDA CALC-SCNC: 2.6 MMOL/L (ref -2–3)
BILIRUB DIRECT SERPL-MCNC: 0.1 MG/DL (ref 0–0.3)
BILIRUB SERPL-MCNC: 0.7 MG/DL (ref 0–1.2)
BODY TEMPERATURE: ABNORMAL
BODY TEMPERATURE: ABNORMAL
BUN SERPL-MCNC: 22 MG/DL (ref 6–23)
CA-I BLDA-SCNC: 1.16 MMOL/L (ref 1.1–1.33)
CA-I BLDA-SCNC: 1.2 MMOL/L (ref 1.1–1.33)
CALCIUM SERPL-MCNC: 8.7 MG/DL (ref 8.6–10.3)
CHLORIDE BLDA-SCNC: 101 MMOL/L (ref 98–107)
CHLORIDE BLDA-SCNC: 101 MMOL/L (ref 98–107)
CHLORIDE SERPL-SCNC: 100 MMOL/L (ref 98–107)
CHOLEST SERPL-MCNC: 115 MG/DL (ref 0–199)
CHOLESTEROL/HDL RATIO: 3.5
CO2 SERPL-SCNC: 27 MMOL/L (ref 21–32)
CREAT SERPL-MCNC: 1.12 MG/DL (ref 0.5–1.3)
CRP SERPL-MCNC: 2.89 MG/DL
D DIMER PPP FEU-MCNC: 334 NG/ML FEU
EST. AVERAGE GLUCOSE BLD GHB EST-MCNC: 134 MG/DL
FERRITIN SERPL-MCNC: 102 NG/ML (ref 20–300)
GFR SERPL CREATININE-BSD FRML MDRD: 75 ML/MIN/1.73M*2
GLUCOSE BLD MANUAL STRIP-MCNC: 122 MG/DL (ref 74–99)
GLUCOSE BLD MANUAL STRIP-MCNC: 138 MG/DL (ref 74–99)
GLUCOSE BLD MANUAL STRIP-MCNC: 145 MG/DL (ref 74–99)
GLUCOSE BLD MANUAL STRIP-MCNC: 174 MG/DL (ref 74–99)
GLUCOSE BLDA-MCNC: 178 MG/DL (ref 74–99)
GLUCOSE BLDA-MCNC: 179 MG/DL (ref 74–99)
GLUCOSE SERPL-MCNC: 163 MG/DL (ref 74–99)
HBA1C MFR BLD: 6.3 %
HCO3 BLDA-SCNC: 27.7 MMOL/L (ref 22–26)
HCO3 BLDA-SCNC: 31.3 MMOL/L (ref 22–26)
HCT VFR BLD EST: 44 % (ref 41–52)
HCT VFR BLD EST: 45 % (ref 41–52)
HDLC SERPL-MCNC: 32.9 MG/DL
HGB BLDA-MCNC: 14.6 G/DL (ref 13.5–17.5)
HGB BLDA-MCNC: 14.9 G/DL (ref 13.5–17.5)
INHALED O2 CONCENTRATION: 100 %
INHALED O2 CONCENTRATION: 80 %
LACTATE BLDA-SCNC: 1.9 MMOL/L (ref 0.4–2)
LACTATE BLDA-SCNC: 2.1 MMOL/L (ref 0.4–2)
LDLC SERPL CALC-MCNC: 61 MG/DL
NON HDL CHOLESTEROL: 82 MG/DL (ref 0–149)
OXYHGB MFR BLDA: 91.3 % (ref 94–98)
OXYHGB MFR BLDA: 95.3 % (ref 94–98)
PCO2 BLDA: 48 MM HG (ref 38–42)
PCO2 BLDA: 65 MM HG (ref 38–42)
PH BLDA: 7.29 PH (ref 7.38–7.42)
PH BLDA: 7.37 PH (ref 7.38–7.42)
PO2 BLDA: 68 MM HG (ref 85–95)
PO2 BLDA: 94 MM HG (ref 85–95)
POTASSIUM BLDA-SCNC: 4.9 MMOL/L (ref 3.5–5.3)
POTASSIUM BLDA-SCNC: 5.3 MMOL/L (ref 3.5–5.3)
POTASSIUM SERPL-SCNC: 4.7 MMOL/L (ref 3.5–5.3)
PROT SERPL-MCNC: 7.5 G/DL (ref 6.4–8.2)
SAO2 % BLDA: 94 % (ref 94–100)
SAO2 % BLDA: 98 % (ref 94–100)
SODIUM BLDA-SCNC: 134 MMOL/L (ref 136–145)
SODIUM BLDA-SCNC: 136 MMOL/L (ref 136–145)
SODIUM SERPL-SCNC: 136 MMOL/L (ref 136–145)
TEST COMMENT: ABNORMAL
TRIGL SERPL-MCNC: 104 MG/DL (ref 0–149)
VLDL: 21 MG/DL (ref 0–40)

## 2023-12-09 PROCEDURE — 3E0333Z INTRODUCTION OF ANTI-INFLAMMATORY INTO PERIPHERAL VEIN, PERCUTANEOUS APPROACH: ICD-10-PCS | Performed by: FAMILY MEDICINE

## 2023-12-09 PROCEDURE — 86140 C-REACTIVE PROTEIN: CPT | Performed by: INTERNAL MEDICINE

## 2023-12-09 PROCEDURE — 96372 THER/PROPH/DIAG INJ SC/IM: CPT | Performed by: INTERNAL MEDICINE

## 2023-12-09 PROCEDURE — 2500000004 HC RX 250 GENERAL PHARMACY W/ HCPCS (ALT 636 FOR OP/ED): Performed by: INTERNAL MEDICINE

## 2023-12-09 PROCEDURE — 80061 LIPID PANEL: CPT | Performed by: INTERNAL MEDICINE

## 2023-12-09 PROCEDURE — 3E0DX3Z INTRODUCTION OF ANTI-INFLAMMATORY INTO MOUTH AND PHARYNX, EXTERNAL APPROACH: ICD-10-PCS | Performed by: FAMILY MEDICINE

## 2023-12-09 PROCEDURE — 82728 ASSAY OF FERRITIN: CPT | Performed by: INTERNAL MEDICINE

## 2023-12-09 PROCEDURE — 84145 PROCALCITONIN (PCT): CPT | Mod: GEALAB | Performed by: INTERNAL MEDICINE

## 2023-12-09 PROCEDURE — 2500000005 HC RX 250 GENERAL PHARMACY W/O HCPCS: Performed by: INTERNAL MEDICINE

## 2023-12-09 PROCEDURE — 99222 1ST HOSP IP/OBS MODERATE 55: CPT | Performed by: PSYCHIATRY & NEUROLOGY

## 2023-12-09 PROCEDURE — XW033E5 INTRODUCTION OF REMDESIVIR ANTI-INFECTIVE INTO PERIPHERAL VEIN, PERCUTANEOUS APPROACH, NEW TECHNOLOGY GROUP 5: ICD-10-PCS | Performed by: FAMILY MEDICINE

## 2023-12-09 PROCEDURE — 36415 COLL VENOUS BLD VENIPUNCTURE: CPT | Performed by: INTERNAL MEDICINE

## 2023-12-09 PROCEDURE — 2500000001 HC RX 250 WO HCPCS SELF ADMINISTERED DRUGS (ALT 637 FOR MEDICARE OP): Performed by: INTERNAL MEDICINE

## 2023-12-09 PROCEDURE — 83036 HEMOGLOBIN GLYCOSYLATED A1C: CPT | Mod: GEALAB | Performed by: INTERNAL MEDICINE

## 2023-12-09 PROCEDURE — 84132 ASSAY OF SERUM POTASSIUM: CPT | Performed by: INTERNAL MEDICINE

## 2023-12-09 PROCEDURE — 36600 WITHDRAWAL OF ARTERIAL BLOOD: CPT

## 2023-12-09 PROCEDURE — 85379 FIBRIN DEGRADATION QUANT: CPT | Performed by: INTERNAL MEDICINE

## 2023-12-09 PROCEDURE — 99223 1ST HOSP IP/OBS HIGH 75: CPT | Performed by: INTERNAL MEDICINE

## 2023-12-09 PROCEDURE — 82947 ASSAY GLUCOSE BLOOD QUANT: CPT

## 2023-12-09 PROCEDURE — 82248 BILIRUBIN DIRECT: CPT | Performed by: INTERNAL MEDICINE

## 2023-12-09 PROCEDURE — 87040 BLOOD CULTURE FOR BACTERIA: CPT | Mod: GEALAB | Performed by: INTERNAL MEDICINE

## 2023-12-09 PROCEDURE — 2060000001 HC INTERMEDIATE ICU ROOM DAILY

## 2023-12-09 RX ORDER — NAPROXEN SODIUM 220 MG/1
81 TABLET, FILM COATED ORAL DAILY
Status: DISCONTINUED | OUTPATIENT
Start: 2023-12-09 | End: 2023-12-09

## 2023-12-09 RX ORDER — ATORVASTATIN CALCIUM 80 MG/1
80 TABLET, FILM COATED ORAL NIGHTLY
Status: DISCONTINUED | OUTPATIENT
Start: 2023-12-09 | End: 2023-12-19 | Stop reason: HOSPADM

## 2023-12-09 RX ORDER — ENOXAPARIN SODIUM 100 MG/ML
40 INJECTION SUBCUTANEOUS EVERY 24 HOURS
Status: DISCONTINUED | OUTPATIENT
Start: 2023-12-09 | End: 2023-12-19 | Stop reason: HOSPADM

## 2023-12-09 RX ORDER — ASPIRIN 300 MG/1
300 SUPPOSITORY RECTAL ONCE
Status: DISCONTINUED | OUTPATIENT
Start: 2023-12-09 | End: 2023-12-18

## 2023-12-09 RX ORDER — HYDRALAZINE HYDROCHLORIDE 20 MG/ML
10 INJECTION INTRAMUSCULAR; INTRAVENOUS
Status: ACTIVE | OUTPATIENT
Start: 2023-12-09 | End: 2023-12-11

## 2023-12-09 RX ORDER — ATORVASTATIN CALCIUM 80 MG/1
80 TABLET, FILM COATED ORAL NIGHTLY
Status: DISCONTINUED | OUTPATIENT
Start: 2023-12-09 | End: 2023-12-09

## 2023-12-09 RX ORDER — HYDRALAZINE HYDROCHLORIDE 25 MG/1
25 TABLET, FILM COATED ORAL EVERY 6 HOURS PRN
Status: DISCONTINUED | OUTPATIENT
Start: 2023-12-11 | End: 2023-12-19 | Stop reason: HOSPADM

## 2023-12-09 RX ORDER — GUAIFENESIN 100 MG/5ML
200 SOLUTION ORAL EVERY 4 HOURS PRN
Status: DISCONTINUED | OUTPATIENT
Start: 2023-12-09 | End: 2023-12-19 | Stop reason: HOSPADM

## 2023-12-09 RX ORDER — METOPROLOL SUCCINATE 50 MG/1
50 TABLET, EXTENDED RELEASE ORAL DAILY
Status: DISCONTINUED | OUTPATIENT
Start: 2023-12-09 | End: 2023-12-19 | Stop reason: HOSPADM

## 2023-12-09 RX ORDER — POLYETHYLENE GLYCOL 3350 17 G/17G
17 POWDER, FOR SOLUTION ORAL DAILY
Status: DISCONTINUED | OUTPATIENT
Start: 2023-12-09 | End: 2023-12-19 | Stop reason: HOSPADM

## 2023-12-09 RX ORDER — ASPIRIN 81 MG/1
81 TABLET ORAL DAILY
Status: DISCONTINUED | OUTPATIENT
Start: 2023-12-10 | End: 2023-12-19 | Stop reason: HOSPADM

## 2023-12-09 RX ORDER — ACETAMINOPHEN 325 MG/1
650 TABLET ORAL EVERY 4 HOURS PRN
Status: DISCONTINUED | OUTPATIENT
Start: 2023-12-09 | End: 2023-12-19 | Stop reason: HOSPADM

## 2023-12-09 RX ORDER — LISINOPRIL 20 MG/1
40 TABLET ORAL DAILY
Status: DISCONTINUED | OUTPATIENT
Start: 2023-12-09 | End: 2023-12-19 | Stop reason: HOSPADM

## 2023-12-09 RX ORDER — LORAZEPAM 2 MG/ML
2 INJECTION INTRAMUSCULAR ONCE AS NEEDED
Status: COMPLETED | OUTPATIENT
Start: 2023-12-09 | End: 2023-12-10

## 2023-12-09 RX ORDER — POLYETHYLENE GLYCOL 3350 17 G/17G
17 POWDER, FOR SOLUTION ORAL DAILY PRN
Status: DISCONTINUED | OUTPATIENT
Start: 2023-12-09 | End: 2023-12-19 | Stop reason: HOSPADM

## 2023-12-09 RX ORDER — CEFTRIAXONE 2 G/50ML
2 INJECTION, SOLUTION INTRAVENOUS EVERY 24 HOURS
Status: DISCONTINUED | OUTPATIENT
Start: 2023-12-09 | End: 2023-12-11

## 2023-12-09 RX ORDER — LABETALOL HYDROCHLORIDE 5 MG/ML
10 INJECTION, SOLUTION INTRAVENOUS EVERY 10 MIN PRN
Status: ACTIVE | OUTPATIENT
Start: 2023-12-09 | End: 2023-12-11

## 2023-12-09 RX ORDER — DEXAMETHASONE 0.5 MG/5ML
6 ELIXIR ORAL DAILY
Status: DISCONTINUED | OUTPATIENT
Start: 2023-12-09 | End: 2023-12-15

## 2023-12-09 RX ORDER — DEXAMETHASONE 6 MG/1
6 TABLET ORAL DAILY
Status: DISCONTINUED | OUTPATIENT
Start: 2023-12-09 | End: 2023-12-15

## 2023-12-09 RX ORDER — DEXAMETHASONE SODIUM PHOSPHATE 10 MG/ML
6 INJECTION INTRAMUSCULAR; INTRAVENOUS DAILY
Status: DISCONTINUED | OUTPATIENT
Start: 2023-12-09 | End: 2023-12-15

## 2023-12-09 RX ADMIN — LEVETIRACETAM 1000 MG: 10 INJECTION INTRAVENOUS at 21:04

## 2023-12-09 RX ADMIN — CEFTRIAXONE SODIUM 2 G: 2 INJECTION, SOLUTION INTRAVENOUS at 12:38

## 2023-12-09 RX ADMIN — AZITHROMYCIN MONOHYDRATE 500 MG: 500 INJECTION, POWDER, LYOPHILIZED, FOR SOLUTION INTRAVENOUS at 13:10

## 2023-12-09 RX ADMIN — ENOXAPARIN SODIUM 40 MG: 40 INJECTION SUBCUTANEOUS at 09:33

## 2023-12-09 RX ADMIN — LEVETIRACETAM 1000 MG: 10 INJECTION INTRAVENOUS at 09:33

## 2023-12-09 RX ADMIN — ATORVASTATIN CALCIUM 80 MG: 80 TABLET, FILM COATED ORAL at 21:05

## 2023-12-09 RX ADMIN — REMDESIVIR 200 MG: 100 INJECTION, POWDER, LYOPHILIZED, FOR SOLUTION INTRAVENOUS at 15:18

## 2023-12-09 RX ADMIN — Medication 5 L/MIN: at 08:00

## 2023-12-09 RX ADMIN — METOPROLOL SUCCINATE 50 MG: 50 TABLET, EXTENDED RELEASE ORAL at 09:33

## 2023-12-09 RX ADMIN — Medication 6 L/MIN: at 20:00

## 2023-12-09 RX ADMIN — LISINOPRIL 40 MG: 20 TABLET ORAL at 09:33

## 2023-12-09 RX ADMIN — DEXAMETHASONE SODIUM PHOSPHATE 6 MG: 10 INJECTION, SOLUTION INTRAMUSCULAR; INTRAVENOUS at 02:25

## 2023-12-09 ASSESSMENT — PAIN SCALES - WONG BAKER: WONGBAKER_NUMERICALRESPONSE: NO HURT

## 2023-12-09 ASSESSMENT — COGNITIVE AND FUNCTIONAL STATUS - GENERAL
CLIMB 3 TO 5 STEPS WITH RAILING: A LITTLE
DAILY ACTIVITIY SCORE: 24
STANDING UP FROM CHAIR USING ARMS: A LITTLE
CLIMB 3 TO 5 STEPS WITH RAILING: A LOT
MOBILITY SCORE: 22
MOBILITY SCORE: 20
WALKING IN HOSPITAL ROOM: A LITTLE
WALKING IN HOSPITAL ROOM: A LITTLE
CLIMB 3 TO 5 STEPS WITH RAILING: A LITTLE
MOBILITY SCORE: 22
DAILY ACTIVITIY SCORE: 24
DAILY ACTIVITIY SCORE: 24
WALKING IN HOSPITAL ROOM: A LITTLE

## 2023-12-09 ASSESSMENT — PAIN - FUNCTIONAL ASSESSMENT: PAIN_FUNCTIONAL_ASSESSMENT: 0-10

## 2023-12-09 ASSESSMENT — PAIN SCALES - GENERAL: PAINLEVEL_OUTOF10: 0 - NO PAIN
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
## 2023-12-09 NOTE — CARE PLAN
The patient's goals for the shift include  improve mentation    The clinical goals for the shift include improve mentation    Over the shift, the patient did not make progress toward the following goals. Barriers to progression include patient motivation Recommendations to address these barriers include education
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
## 2023-12-09 NOTE — CARE PLAN
Spoke with son(Toby) who acts as POA for patient during hospitalizations previously. Reports patient has been taking medications and going to appointments since last stroke. He states that at baseline patient has no physical impairments or sensory deficits but has expressive aphasia. He would like patient to get full work up and to discuss POA paperwork options with social work.

## 2023-12-09 NOTE — CONSULTS
Consults  Referred by Abhinav Elliott    Primary MD: Angelica Dalal, APRN-CNP    Reason For Consult  COVID19    History Of Present Illness  Jg Starr is a 63 y.o. male hx of obesity, hx of HTN, hx of DM, hx of CVA, hx of CAD, he was admitted for confusion, he developed hypoxia and needed high flow O2, the covid screen is positive, the ct without PE, he is unable to provide a hx     Past Medical History  He has no past medical history on file.    Surgical History  He has no past surgical history on file.     Social History     Occupational History    Not on file   Tobacco Use    Smoking status: Never    Smokeless tobacco: Never   Vaping Use    Vaping Use: Never used   Substance and Sexual Activity    Alcohol use: Never    Drug use: Never    Sexual activity: Not on file     Travel History   Travel since 11/09/23    No documented travel since 11/09/23          Family History  No family history on file.. DM, HTN, CVA  Allergies  Patient has no known allergies.       There is no immunization history on file for this patient.  Medications  Home medications:  Medications Prior to Admission   Medication Sig Dispense Refill Last Dose    aspirin 81 mg chewable tablet        atorvastatin (Lipitor) 80 mg tablet Take 1 tablet (80 mg) by mouth once daily at bedtime. 90 tablet 1     lisinopril 40 mg tablet Take 1 tablet (40 mg) by mouth once daily. 90 tablet 1     metoprolol succinate XL (Toprol-XL) 50 mg 24 hr tablet Take 1 tablet (50 mg) by mouth once daily. 90 tablet 1      Current medications:  Scheduled medications  [START ON 12/10/2023] aspirin, 81 mg, oral, Daily  aspirin, 300 mg, rectal, Once  atorvastatin, 80 mg, oral, Nightly  dexAMETHasone, 6 mg, oral, Daily   Or  dexAMETHasone, 6 mg, oral, Daily   Or  dexAMETHasone, 6 mg, intravenous, Daily  enoxaparin, 40 mg, subcutaneous, q24h  levETIRAcetam, 1,000 mg, intravenous, q12h  lisinopril, 40 mg, oral, Daily  metoprolol succinate XL, 50 mg, oral, Daily  oxygen, ,  "inhalation, Continuous - Inhalation  polyethylene glycol, 17 g, oral, Daily      Continuous medications     PRN medications  PRN medications: acetaminophen, guaiFENesin, hydrALAZINE **FOLLOWED BY** [START ON 12/11/2023] hydrALAZINE, labetaloL, polyethylene glycol    Review of Systems   Unable to perform ROS: Mental status change        Objective  Range of Vitals (last 24 hours)  Heart Rate:  []   Temp:  [36.7 °C (98.1 °F)-37.3 °C (99.1 °F)]   Resp:  [14-22]   BP: (116-239)/()   Height:  [188 cm (6' 2\")]   Weight:  [126 kg (276 lb 14.4 oz)]   SpO2:  [87 %-95 %]   Daily Weight  12/08/23 : 126 kg (276 lb 14.4 oz)    Body mass index is 35.55 kg/m².     Physical Exam  Constitutional:       Appearance: Normal appearance.      Comments: confused   HENT:      Head: Normocephalic and atraumatic.      Mouth/Throat:      Mouth: Mucous membranes are moist.      Pharynx: Oropharynx is clear.   Eyes:      Pupils: Pupils are equal, round, and reactive to light.   Cardiovascular:      Rate and Rhythm: Normal rate and regular rhythm.      Heart sounds: Normal heart sounds.   Pulmonary:      Effort: Pulmonary effort is normal.      Breath sounds: Normal breath sounds.   Abdominal:      General: Abdomen is flat. Bowel sounds are normal.      Palpations: Abdomen is soft.   Musculoskeletal:      Cervical back: Normal range of motion.   Neurological:      Mental Status: He is alert.          Relevant Results  Outside Hospital Results  reviewed  Labs  Results from last 72 hours   Lab Units 12/08/23 1926   WBC AUTO x10*3/uL 10.6   HEMOGLOBIN g/dL 14.9   HEMATOCRIT % 47.1   PLATELETS AUTO x10*3/uL 237   NEUTROS PCT AUTO % 79.9   LYMPHS PCT AUTO % 7.3   MONOS PCT AUTO % 9.1   EOS PCT AUTO % 2.4     Results from last 72 hours   Lab Units 12/09/23  0601 12/08/23 1926   SODIUM mmol/L 136 136   POTASSIUM mmol/L 4.7 3.9   CHLORIDE mmol/L 100 99   CO2 mmol/L 27 27   BUN mg/dL 22 16   CREATININE mg/dL 1.12 0.92   GLUCOSE mg/dL 163* " "155*   CALCIUM mg/dL 8.7 8.9   ANION GAP mmol/L 14 14   EGFR mL/min/1.73m*2 75 >90     Results from last 72 hours   Lab Units 12/09/23  0601 12/08/23  1926   ALK PHOS U/L 63 66   BILIRUBIN TOTAL mg/dL 0.7 0.8   BILIRUBIN DIRECT mg/dL 0.1  --    PROTEIN TOTAL g/dL 7.5 7.7   ALT U/L 18 18   AST U/L 14 17   ALBUMIN g/dL 4.1 4.3     Estimated Creatinine Clearance: 97.7 mL/min (by C-G formula based on SCr of 1.12 mg/dL).  No results found for: \"CRP\", \"SEDRATE\"  No results found for: \"HIV1X2\", \"HIVCONF\", \"JESQNA3HJ\"  No results found for: \"HEPCABINIT\", \"HEPCAB\", \"HCVPCRQUANT\"  Microbiology  Reviewed  Imaging  Reviewed       Assessment/Plan   Respiratory failure / pneumonia / COVID19 infection  Encephalopathy    Recommendations :  Azithromycin and Rocephin pending the cultures and procalcitonin  Dexamethasone protocol  Remdesivir protocol  Inflammatory markers     I spent minutes in the professional and overall care of this patient.      Nazia Sibley MD  "

## 2023-12-09 NOTE — PROGRESS NOTES
Pharmacy Medication History Review    Jg Starr is a 63 y.o. male admitted for No Principal Problem: There is no principal problem currently on the Problem List. Please update the Problem List and refresh.. Pharmacy reviewed the patient's [id number] medications and allergies for accuracy.    The list below reflectives the updated PTA list. Please review each medication in order reconciliation for additional clarification and justification.  (Not in a hospital admission)       The list below reflectives the updated allergy list. Please review each documented allergy for additional clarification and justification.  Allergies  Indicated as Unable to Assess by Katelynn Cervantes CPhT on 12/8/2023 (Patient unable to communicate)   No Known Allergies         Below are additional concerns with the patient's PTA list.  Unable to complete medrec, pts son going to report back with medications he finds at the pts house tomorrow.    Katelynn Cervantes CPhT

## 2023-12-10 ENCOUNTER — APPOINTMENT (OUTPATIENT)
Dept: RADIOLOGY | Facility: HOSPITAL | Age: 63
End: 2023-12-10
Payer: MEDICAID

## 2023-12-10 LAB
ANION GAP SERPL CALC-SCNC: 13 MMOL/L (ref 10–20)
BUN SERPL-MCNC: 29 MG/DL (ref 6–23)
CALCIUM SERPL-MCNC: 8.6 MG/DL (ref 8.6–10.3)
CHLORIDE SERPL-SCNC: 99 MMOL/L (ref 98–107)
CO2 SERPL-SCNC: 28 MMOL/L (ref 21–32)
CREAT SERPL-MCNC: 1.02 MG/DL (ref 0.5–1.3)
ERYTHROCYTE [DISTWIDTH] IN BLOOD BY AUTOMATED COUNT: 13.3 % (ref 11.5–14.5)
GFR SERPL CREATININE-BSD FRML MDRD: 84 ML/MIN/1.73M*2
GLUCOSE BLD MANUAL STRIP-MCNC: 130 MG/DL (ref 74–99)
GLUCOSE BLD MANUAL STRIP-MCNC: 134 MG/DL (ref 74–99)
GLUCOSE BLD MANUAL STRIP-MCNC: 192 MG/DL (ref 74–99)
GLUCOSE BLD MANUAL STRIP-MCNC: 97 MG/DL (ref 74–99)
GLUCOSE SERPL-MCNC: 123 MG/DL (ref 74–99)
HCT VFR BLD AUTO: 41.9 % (ref 41–52)
HGB BLD-MCNC: 13 G/DL (ref 13.5–17.5)
MCH RBC QN AUTO: 26.1 PG (ref 26–34)
MCHC RBC AUTO-ENTMCNC: 31 G/DL (ref 32–36)
MCV RBC AUTO: 84 FL (ref 80–100)
NRBC BLD-RTO: 0 /100 WBCS (ref 0–0)
PLATELET # BLD AUTO: 267 X10*3/UL (ref 150–450)
POTASSIUM SERPL-SCNC: 4.2 MMOL/L (ref 3.5–5.3)
PROCALCITONIN SERPL-MCNC: 0.12 NG/ML
PROCALCITONIN SERPL-MCNC: 0.13 NG/ML
RBC # BLD AUTO: 4.99 X10*6/UL (ref 4.5–5.9)
SODIUM SERPL-SCNC: 136 MMOL/L (ref 136–145)
WBC # BLD AUTO: 10.2 X10*3/UL (ref 4.4–11.3)

## 2023-12-10 PROCEDURE — 85027 COMPLETE CBC AUTOMATED: CPT | Performed by: FAMILY MEDICINE

## 2023-12-10 PROCEDURE — 2500000004 HC RX 250 GENERAL PHARMACY W/ HCPCS (ALT 636 FOR OP/ED): Performed by: INTERNAL MEDICINE

## 2023-12-10 PROCEDURE — 70551 MRI BRAIN STEM W/O DYE: CPT | Performed by: RADIOLOGY

## 2023-12-10 PROCEDURE — 2500000001 HC RX 250 WO HCPCS SELF ADMINISTERED DRUGS (ALT 637 FOR MEDICARE OP): Performed by: INTERNAL MEDICINE

## 2023-12-10 PROCEDURE — 36415 COLL VENOUS BLD VENIPUNCTURE: CPT | Performed by: FAMILY MEDICINE

## 2023-12-10 PROCEDURE — 2500000005 HC RX 250 GENERAL PHARMACY W/O HCPCS: Performed by: INTERNAL MEDICINE

## 2023-12-10 PROCEDURE — 96372 THER/PROPH/DIAG INJ SC/IM: CPT | Performed by: INTERNAL MEDICINE

## 2023-12-10 PROCEDURE — 70551 MRI BRAIN STEM W/O DYE: CPT

## 2023-12-10 PROCEDURE — 99233 SBSQ HOSP IP/OBS HIGH 50: CPT | Performed by: PSYCHIATRY & NEUROLOGY

## 2023-12-10 PROCEDURE — 82947 ASSAY GLUCOSE BLOOD QUANT: CPT

## 2023-12-10 PROCEDURE — 80048 BASIC METABOLIC PNL TOTAL CA: CPT | Performed by: FAMILY MEDICINE

## 2023-12-10 PROCEDURE — 2060000001 HC INTERMEDIATE ICU ROOM DAILY

## 2023-12-10 PROCEDURE — 2500000001 HC RX 250 WO HCPCS SELF ADMINISTERED DRUGS (ALT 637 FOR MEDICARE OP): Performed by: PSYCHIATRY & NEUROLOGY

## 2023-12-10 PROCEDURE — 99233 SBSQ HOSP IP/OBS HIGH 50: CPT | Performed by: FAMILY MEDICINE

## 2023-12-10 PROCEDURE — 2500000004 HC RX 250 GENERAL PHARMACY W/ HCPCS (ALT 636 FOR OP/ED): Performed by: FAMILY MEDICINE

## 2023-12-10 RX ORDER — LEVETIRACETAM 500 MG/1
500 TABLET ORAL 2 TIMES DAILY
Status: DISCONTINUED | OUTPATIENT
Start: 2023-12-10 | End: 2023-12-13

## 2023-12-10 RX ADMIN — ENOXAPARIN SODIUM 40 MG: 40 INJECTION SUBCUTANEOUS at 09:53

## 2023-12-10 RX ADMIN — REMDESIVIR 100 MG: 100 INJECTION, POWDER, LYOPHILIZED, FOR SOLUTION INTRAVENOUS at 15:27

## 2023-12-10 RX ADMIN — LORAZEPAM 2 MG: 2 INJECTION INTRAMUSCULAR; INTRAVENOUS at 16:33

## 2023-12-10 RX ADMIN — CEFTRIAXONE SODIUM 2 G: 2 INJECTION, SOLUTION INTRAVENOUS at 12:43

## 2023-12-10 RX ADMIN — LISINOPRIL 40 MG: 20 TABLET ORAL at 09:53

## 2023-12-10 RX ADMIN — LEVETIRACETAM 500 MG: 500 TABLET, FILM COATED ORAL at 20:34

## 2023-12-10 RX ADMIN — METOPROLOL SUCCINATE 50 MG: 50 TABLET, EXTENDED RELEASE ORAL at 09:53

## 2023-12-10 RX ADMIN — Medication: at 20:00

## 2023-12-10 RX ADMIN — ATORVASTATIN CALCIUM 80 MG: 80 TABLET, FILM COATED ORAL at 20:34

## 2023-12-10 RX ADMIN — ASPIRIN 81 MG: 81 TABLET, COATED ORAL at 09:53

## 2023-12-10 RX ADMIN — POLYETHYLENE GLYCOL 3350 17 G: 17 POWDER, FOR SOLUTION ORAL at 09:53

## 2023-12-10 RX ADMIN — AZITHROMYCIN MONOHYDRATE 500 MG: 500 INJECTION, POWDER, LYOPHILIZED, FOR SOLUTION INTRAVENOUS at 12:43

## 2023-12-10 RX ADMIN — LEVETIRACETAM 1000 MG: 10 INJECTION INTRAVENOUS at 09:54

## 2023-12-10 RX ADMIN — DEXAMETHASONE 6 MG: 6 TABLET ORAL at 09:53

## 2023-12-10 ASSESSMENT — COGNITIVE AND FUNCTIONAL STATUS - GENERAL
STANDING UP FROM CHAIR USING ARMS: A LITTLE
MOVING TO AND FROM BED TO CHAIR: A LITTLE
DRESSING REGULAR UPPER BODY CLOTHING: A LITTLE
CLIMB 3 TO 5 STEPS WITH RAILING: A LOT
PERSONAL GROOMING: A LITTLE
TOILETING: A LITTLE
HELP NEEDED FOR BATHING: A LITTLE
DRESSING REGULAR LOWER BODY CLOTHING: A LITTLE
WALKING IN HOSPITAL ROOM: A LITTLE
DAILY ACTIVITIY SCORE: 19
MOBILITY SCORE: 19

## 2023-12-10 ASSESSMENT — PAIN - FUNCTIONAL ASSESSMENT
PAIN_FUNCTIONAL_ASSESSMENT: 0-10
PAIN_FUNCTIONAL_ASSESSMENT: 0-10

## 2023-12-10 ASSESSMENT — PAIN SCALES - GENERAL
PAINLEVEL_OUTOF10: 0 - NO PAIN

## 2023-12-10 NOTE — PROGRESS NOTES
12/10/23 1522   Discharge Planning   Living Arrangements Spouse/significant other   Support Systems Spouse/significant other;Family members;Friends/neighbors   Assistance Needed Patient has a history of a stroke with expressive aphasia but is A&O X3 at baseline. Per son, patient uses no DME at baseline and is independent with ADLS. Patient is on room air at home but is requiring O2 here.   Type of Residence Private residence   Who is requesting discharge planning? Provider   Home or Post Acute Services Post acute facilities (Rehab/SNF/etc)   Type of Post Acute Facility Services Rehab;Skilled nursing   Patient expects to be discharged to: TBD   Does the patient need discharge transport arranged? No

## 2023-12-10 NOTE — CARE PLAN
The patient's goals for the shift include      The clinical goals for the shift include will wean o2    Over the shift, the patient did not make progress toward the following goals. Barriers to progression include impaired gas exchange. Recommendations to address these barriers include deep breathing.

## 2023-12-10 NOTE — PROGRESS NOTES
Jg Starr is a 63 y.o. male on day 2 of admission presenting with Altered mental status, unspecified altered mental status type.    Subjective   Interval History: no fever, no new complaints, the hx is not reliable         Review of Systems    Objective   Range of Vitals (last 24 hours)  Heart Rate:  [70-86]   Temp:  [36.7 °C (98.1 °F)-37.9 °C (100.2 °F)]   Resp:  [17-18]   BP: (111-150)/(68-85)   SpO2:  [93 %-96 %]   Daily Weight  12/08/23 : 126 kg (276 lb 14.4 oz)    Body mass index is 35.55 kg/m².    Physical Exam  Constitutional:       Appearance: Normal appearance.   HENT:      Head: Normocephalic and atraumatic.      Mouth/Throat:      Mouth: Mucous membranes are moist.      Pharynx: Oropharynx is clear.   Eyes:      Pupils: Pupils are equal, round, and reactive to light.   Cardiovascular:      Rate and Rhythm: Normal rate and regular rhythm.      Heart sounds: Normal heart sounds.   Pulmonary:      Effort: Pulmonary effort is normal.      Breath sounds: Normal breath sounds.   Abdominal:      General: Abdomen is flat. Bowel sounds are normal.      Palpations: Abdomen is soft.   Musculoskeletal:      Cervical back: Normal range of motion.   Neurological:      Mental Status: He is alert.         Antibiotics  LORazepam (Ativan) injection  - Omnicell Override Pull  LORazepam (Ativan) injection 2 mg  levETIRAcetam in NaCl (iso-os) (Keppra) IV 1,000 mg  iohexol (OMNIPaque) 350 mg iodine/mL solution 85 mL  labetaloL (Normodyne,Trandate) injection 5 mg  labetaloL (Normodyne,Trandate) injection 5 mg  labetaloL (Normodyne,Trandate) injection 10 mg  aspirin chewable tablet 81 mg  atorvastatin (Lipitor) tablet 80 mg  lisinopril tablet 40 mg  metoprolol succinate XL (Toprol-XL) 24 hr tablet 50 mg  oxygen (O2) therapy  perflutren lipid microspheres (Definity) injection 3 mL of dilution  labetaloL (Normodyne,Trandate) injection 10 mg  hydrALAZINE (Apresoline) injection 10 mg  hydrALAZINE (Apresoline) tablet 25  mg  polyethylene glycol (Glycolax, Miralax) packet 17 g  enoxaparin (Lovenox) syringe 40 mg  atorvastatin (Lipitor) tablet 80 mg  aspirin EC tablet 81 mg  acetaminophen (Tylenol) tablet 650 mg  dexAMETHasone (Decadron) tablet 6 mg  dexAMETHasone oral liquid 6 mg  dexAMETHasone (PF) (Decadron) injection 6 mg  polyethylene glycol (Glycolax, Miralax) packet 17 g  guaiFENesin (Robitussin) 100 mg/5 mL syrup 200 mg  aspirin suppository 300 mg  oxygen (O2) therapy  azithromycin (Zithromax) in dextrose 5 % in water (D5W) 250 mL  mg  cefTRIAXone (Rocephin) 2 g IV in dextrose 5% 50 mL  remdesivir (Veklury) 200 mg in sodium chloride 0.9% 250 mL IV  remdesivir (Veklury) 100 mg in sodium chloride 0.9% 250 mL IV  LORazepam (Ativan) injection 2 mg      Relevant Results  Labs  Results from last 72 hours   Lab Units 12/10/23  0853 12/08/23 1926   WBC AUTO x10*3/uL 10.2 10.6   HEMOGLOBIN g/dL 13.0* 14.9   HEMATOCRIT % 41.9 47.1   PLATELETS AUTO x10*3/uL 267 237   NEUTROS PCT AUTO %  --  79.9   LYMPHS PCT AUTO %  --  7.3   MONOS PCT AUTO %  --  9.1   EOS PCT AUTO %  --  2.4     Results from last 72 hours   Lab Units 12/10/23  0854 12/09/23  0601 12/08/23 1926   SODIUM mmol/L 136 136 136   POTASSIUM mmol/L 4.2 4.7 3.9   CHLORIDE mmol/L 99 100 99   CO2 mmol/L 28 27 27   BUN mg/dL 29* 22 16   CREATININE mg/dL 1.02 1.12 0.92   GLUCOSE mg/dL 123* 163* 155*   CALCIUM mg/dL 8.6 8.7 8.9   ANION GAP mmol/L 13 14 14   EGFR mL/min/1.73m*2 84 75 >90     Results from last 72 hours   Lab Units 12/09/23  0601 12/08/23 1926   ALK PHOS U/L 63 66   BILIRUBIN TOTAL mg/dL 0.7 0.8   BILIRUBIN DIRECT mg/dL 0.1  --    PROTEIN TOTAL g/dL 7.5 7.7   ALT U/L 18 18   AST U/L 14 17   ALBUMIN g/dL 4.1 4.3     Estimated Creatinine Clearance: 107.2 mL/min (by C-G formula based on SCr of 1.02 mg/dL).  C-Reactive Protein   Date Value Ref Range Status   12/09/2023 2.89 (H) <1.00 mg/dL Final     Microbiology  Reviewed  Imaging  reviewed        Assessment/Plan    Respiratory failure / pneumonia / COVID19 infection  Encephalopathy  Bacteremia     Recommendations :  Azithromycin and Rocephin pending the cultures and procalcitonin  Remdesivir protocol     I spent minutes in the professional and overall care of this patient.      Nazia Sibley MD

## 2023-12-10 NOTE — PROGRESS NOTES
Occupational Therapy                 Therapy Communication Note    Patient Name: Jg Starr  MRN: [id number]  Today's Date: 12/10/2023     Discipline: Occupational Therapy    Missed Visit Reason: Patient placed on medical hold (Nursing deferred evaluation stating pt on seizure precautions and bedrest orders.  Evaluation not completed this date.)    Missed Time: Attempt/Hold per nursing 2140

## 2023-12-10 NOTE — PROGRESS NOTES
"Jg Starr is a 63 y.o. male on day 2 of admission presenting with Altered mental status, unspecified altered mental status type.      Subjective   Patient in Covid restrictions, feeling better.       Objective     Last Recorded Vitals  Blood pressure 151/85, pulse 72, temperature 36.1 °C (97 °F), temperature source Temporal, resp. rate 23, height 1.88 m (6' 2\"), weight 126 kg (276 lb 14.4 oz), SpO2 96 %.    Physical Exam  Neurological Exam  He is alert, follows commands.  Speech is somewhat at slow, trouble answering questions, such as giving me the name of the hospital and the name of the current [place] President.  Facial muscles are slow.  Relevant Results        NIH Stroke Scale  1A. Level of Consciousness: Alert, Keenly Responsive  1B. Ask Month and Age: 1 Question Right  1C. Blink Eyes & Squeeze Hands: Performs Both Tasks  2. Best Gaze: Normal  3. Visual: No Visual Loss  4. Facial Palsy: Normal Symmetrical Movements  5A. Motor - Left Arm: No Drift  5B. Motor - Right Arm: No Drift  6A. Motor - Left Leg: No Drift  6B. Motor - Right Leg: No Drift  7. Limb Ataxia: Absent  8. Sensory Loss: Normal  9. Best Language: No Aphasia  10. Dysarthria: Normal  11. Extinction and Inattention: No Abnormality  NIH Stroke Scale: 1           Tito Coma Scale  Best Eye Response: Spontaneous  Best Verbal Response: Inappropriate words  Best Motor Response: Follows commands  Solomons Coma Scale Score: 13                             Assessment/Plan      Principal Problem:    Altered mental status, unspecified altered mental status type    Impression: 63 year old man with probable generalized seizure PTA, in conjunction with Covid infection.  He is neurologically improved today.  I reviewed his EMR records: he had a left M3 occlusion causing a stroke, affecting his speech, in 12/2022.  Dr. Menjivar saw him in follow up in 3/2023, stated he was non-compliant.  He followed up with his PCP and Dr. Chance, Cardiology, 4/2023- " Brilinta was stopped, continued on a baby aspirin only.  He had a cardiac stent placed at [facility] for a MI, no follow up, no health insurance.  He saw Dr. Collins for an incidental 5 mm cerebral aneurysm, arising from the right MCA- the patient and family were told that he should have a follow up MRA in one year, that the annual rupture rate was <0.5%.  At this time, I think his speech is abnormal from the prior left M3 ischemic stroke.  He likely had a generalized seizure in the context of Covid infection, but with the old left MCA ischemic stroke likely triggering the seizure.  As such, I recommend discharging him on Keppra 500 mg twice a day, follow up with Tyesha Dunn PA-C or myself within a month.  Will change the dose of Keppra today.  Will sign off.    I discussed the risk of resuming driving after discharge, even if he is taking Keppra.  He should not resume driving unless cleared by neurology.      I spent 35 minutes in the professional and overall care of this patient.      Stas Garcia MD

## 2023-12-10 NOTE — PROGRESS NOTES
Jg Starr is a 63 y.o. male on day 2 of admission presenting with Altered mental status, unspecified altered mental status type.      Subjective   Patient resting in bed, denies any current complaints. Per nursing, patient does appear to be less confused and more cooperative with care.    Objective     Last Recorded Vitals  /65 (BP Location: Right arm, Patient Position: Lying)   Pulse 71   Temp 36.1 °C (97 °F) (Temporal)   Resp 21   Wt 126 kg (276 lb 14.4 oz)   SpO2 96%   Intake/Output last 3 Shifts:    Intake/Output Summary (Last 24 hours) at 12/10/2023 1633  Last data filed at 12/10/2023 1418  Gross per 24 hour   Intake 810 ml   Output 825 ml   Net -15 ml       Admission Weight  Weight: 126 kg (276 lb 14.4 oz) (12/08/23 1917)    Daily Weight  12/08/23 : 126 kg (276 lb 14.4 oz)      Physical Exam  Constitutional: alert and oriented x 2-3, awake, cooperative, no acute distress  Skin: warm and dry  Head/Neck: Normocephalic, atraumatic  Eyes: clear sclera  ENMT: mucous membranes moist  Cardio: Regular rate and rhythm  Resp: CTA bilaterally, good respiratory effort  Gastrointestinal: Soft, nontender, nondistended  Musculoskeletal: ROM intact, no joint swelling  Extremities: No edema, cyanosis, or clubbing  Neuro: alert and oriented x 2-3, baseline expressive aphasia  Psychological: Appropriate mood and behavior    Relevant Results  Scheduled medications  aspirin, 81 mg, oral, Daily  aspirin, 300 mg, rectal, Once  atorvastatin, 80 mg, oral, Nightly  azithromycin, 500 mg, intravenous, q24h  cefTRIAXone, 2 g, intravenous, q24h  dexAMETHasone, 6 mg, oral, Daily   Or  dexAMETHasone, 6 mg, oral, Daily   Or  dexAMETHasone, 6 mg, intravenous, Daily  enoxaparin, 40 mg, subcutaneous, q24h  levETIRAcetam, 500 mg, oral, BID  lisinopril, 40 mg, oral, Daily  metoprolol succinate XL, 50 mg, oral, Daily  oxygen, , inhalation, Continuous - Inhalation  perflutren lipid microspheres, 3 mL of dilution, intravenous, Once  in imaging  polyethylene glycol, 17 g, oral, Daily  remdesivir, 100 mg, intravenous, q24h      Continuous medications     PRN medications  PRN medications: acetaminophen, guaiFENesin, hydrALAZINE **FOLLOWED BY** [START ON 12/11/2023] hydrALAZINE, labetaloL, polyethylene glycol    Results for orders placed or performed during the hospital encounter of 12/08/23 (from the past 24 hour(s))   POCT GLUCOSE   Result Value Ref Range    POCT Glucose 122 (H) 74 - 99 mg/dL   POCT GLUCOSE   Result Value Ref Range    POCT Glucose 138 (H) 74 - 99 mg/dL   POCT GLUCOSE   Result Value Ref Range    POCT Glucose 130 (H) 74 - 99 mg/dL   CBC   Result Value Ref Range    WBC 10.2 4.4 - 11.3 x10*3/uL    nRBC 0.0 0.0 - 0.0 /100 WBCs    RBC 4.99 4.50 - 5.90 x10*6/uL    Hemoglobin 13.0 (L) 13.5 - 17.5 g/dL    Hematocrit 41.9 41.0 - 52.0 %    MCV 84 80 - 100 fL    MCH 26.1 26.0 - 34.0 pg    MCHC 31.0 (L) 32.0 - 36.0 g/dL    RDW 13.3 11.5 - 14.5 %    Platelets 267 150 - 450 x10*3/uL   Basic metabolic panel   Result Value Ref Range    Glucose 123 (H) 74 - 99 mg/dL    Sodium 136 136 - 145 mmol/L    Potassium 4.2 3.5 - 5.3 mmol/L    Chloride 99 98 - 107 mmol/L    Bicarbonate 28 21 - 32 mmol/L    Anion Gap 13 10 - 20 mmol/L    Urea Nitrogen 29 (H) 6 - 23 mg/dL    Creatinine 1.02 0.50 - 1.30 mg/dL    eGFR 84 >60 mL/min/1.73m*2    Calcium 8.6 8.6 - 10.3 mg/dL   POCT GLUCOSE   Result Value Ref Range    POCT Glucose 134 (H) 74 - 99 mg/dL   POCT GLUCOSE   Result Value Ref Range    POCT Glucose 192 (H) 74 - 99 mg/dL         Assessment/Plan   64yo CM with PMH of HTN, CAD s/p stent, gout, CVA, intracranial aneurysm and medical noncompliance that presented to the ED for AMS and was admitted for acute metabolic encephalopathy and acute hypoxic respiratory failure secondary to COVID19.     Acute metabolic encephalopathy  - likely secondary to seizure vs. CVA, vs. COVID19  - MRI brain obtained, pending read  - EEG ordered  - neuro consulted, feel this was  likely seizure so would start on keppra 500mg BID and outpt follow up in one month  - per neuro, patient cannot drive until cleared by neurology outpatient  - patient appears to be at baseline mentation    New onset seizure  - likely in setting of COVID19 and stroke  - neuro following, start keppra 500mg BID  - patient cannot drive at discharge    Acute hypoxic respiratory failure secondary to COVID 19  - s/p NRB and airvo on first day of hospitalization  - currently on 5L O2, wean to RA as tolerated  - continue dexamethasone day 2/10  - ID following, continue Remdesivir day 2/5  - procalcitonin elevated, continue azithromycin/ceftriaxone  - encouraged IS use but unclear if patient understood well enough to continue    HTN, CAD s/p stent  - continue home ASA, statin, metoprolol, lisinopril    Hx of left MCA ischemic stroke  - continue home ASA and statin    Hx of intracranial aneurysm  - need outpt follow up with neurosurg for repeat MRA    DVT Proph: lovenox    Dispo: Patient requires close inpatient monitoring in setting of new onset seizures and acute hypoxic respiratory failure secondary to COVID19, discharge planning pending completion of Remdesivir on 12/13.         Principal Problem:    Altered mental status, unspecified altered mental status type        Alba Yarbrough DO

## 2023-12-11 ENCOUNTER — APPOINTMENT (OUTPATIENT)
Dept: CARDIOLOGY | Facility: HOSPITAL | Age: 63
End: 2023-12-11
Payer: MEDICAID

## 2023-12-11 LAB
ALBUMIN SERPL BCP-MCNC: 3.7 G/DL (ref 3.4–5)
ALP SERPL-CCNC: 45 U/L (ref 33–136)
ALT SERPL W P-5'-P-CCNC: 14 U/L (ref 10–52)
ANION GAP SERPL CALC-SCNC: 13 MMOL/L (ref 10–20)
AORTIC VALVE MEAN GRADIENT: 2
AORTIC VALVE PEAK VELOCITY: 1.02
AST SERPL W P-5'-P-CCNC: 12 U/L (ref 9–39)
AV PEAK GRADIENT: 4.2
AVA (PEAK VEL): 3.97
AVA (VTI): 3.82
BILIRUB SERPL-MCNC: 0.6 MG/DL (ref 0–1.2)
BUN SERPL-MCNC: 30 MG/DL (ref 6–23)
CALCIUM SERPL-MCNC: 8.4 MG/DL (ref 8.6–10.3)
CHLORIDE SERPL-SCNC: 100 MMOL/L (ref 98–107)
CO2 SERPL-SCNC: 29 MMOL/L (ref 21–32)
CREAT SERPL-MCNC: 1.03 MG/DL (ref 0.5–1.3)
EJECTION FRACTION APICAL 4 CHAMBER: 45.5
EJECTION FRACTION: 42
ERYTHROCYTE [DISTWIDTH] IN BLOOD BY AUTOMATED COUNT: 13 % (ref 11.5–14.5)
GFR SERPL CREATININE-BSD FRML MDRD: 83 ML/MIN/1.73M*2
GLUCOSE BLD MANUAL STRIP-MCNC: 158 MG/DL (ref 74–99)
GLUCOSE BLD MANUAL STRIP-MCNC: 159 MG/DL (ref 74–99)
GLUCOSE SERPL-MCNC: 113 MG/DL (ref 74–99)
HCT VFR BLD AUTO: 42.9 % (ref 41–52)
HGB BLD-MCNC: 13.6 G/DL (ref 13.5–17.5)
LEFT VENTRICLE INTERNAL DIMENSION DIASTOLE: 5.35 (ref 3.5–6)
LEFT VENTRICULAR OUTFLOW TRACT DIAMETER: 2.4
MAGNESIUM SERPL-MCNC: 2.43 MG/DL (ref 1.6–2.4)
MCH RBC QN AUTO: 26.1 PG (ref 26–34)
MCHC RBC AUTO-ENTMCNC: 31.7 G/DL (ref 32–36)
MCV RBC AUTO: 82 FL (ref 80–100)
MITRAL VALVE E/A RATIO: 1.27
MITRAL VALVE E/E' RATIO: 10.93
NRBC BLD-RTO: 0 /100 WBCS (ref 0–0)
PLATELET # BLD AUTO: 295 X10*3/UL (ref 150–450)
POTASSIUM SERPL-SCNC: 4 MMOL/L (ref 3.5–5.3)
PROT SERPL-MCNC: 6.8 G/DL (ref 6.4–8.2)
RBC # BLD AUTO: 5.21 X10*6/UL (ref 4.5–5.9)
RIGHT VENTRICLE PEAK SYSTOLIC PRESSURE: 17.3
SODIUM SERPL-SCNC: 138 MMOL/L (ref 136–145)
WBC # BLD AUTO: 10.1 X10*3/UL (ref 4.4–11.3)

## 2023-12-11 PROCEDURE — 2500000001 HC RX 250 WO HCPCS SELF ADMINISTERED DRUGS (ALT 637 FOR MEDICARE OP): Performed by: INTERNAL MEDICINE

## 2023-12-11 PROCEDURE — 93306 TTE W/DOPPLER COMPLETE: CPT

## 2023-12-11 PROCEDURE — 2500000004 HC RX 250 GENERAL PHARMACY W/ HCPCS (ALT 636 FOR OP/ED): Performed by: INTERNAL MEDICINE

## 2023-12-11 PROCEDURE — 2060000001 HC INTERMEDIATE ICU ROOM DAILY

## 2023-12-11 PROCEDURE — 2500000001 HC RX 250 WO HCPCS SELF ADMINISTERED DRUGS (ALT 637 FOR MEDICARE OP): Performed by: PSYCHIATRY & NEUROLOGY

## 2023-12-11 PROCEDURE — 93306 TTE W/DOPPLER COMPLETE: CPT | Performed by: INTERNAL MEDICINE

## 2023-12-11 PROCEDURE — 97165 OT EVAL LOW COMPLEX 30 MIN: CPT | Mod: GO

## 2023-12-11 PROCEDURE — 99232 SBSQ HOSP IP/OBS MODERATE 35: CPT | Performed by: FAMILY MEDICINE

## 2023-12-11 PROCEDURE — 97161 PT EVAL LOW COMPLEX 20 MIN: CPT | Mod: GP

## 2023-12-11 PROCEDURE — 85027 COMPLETE CBC AUTOMATED: CPT | Performed by: FAMILY MEDICINE

## 2023-12-11 PROCEDURE — 96372 THER/PROPH/DIAG INJ SC/IM: CPT | Performed by: INTERNAL MEDICINE

## 2023-12-11 PROCEDURE — 80053 COMPREHEN METABOLIC PANEL: CPT | Performed by: FAMILY MEDICINE

## 2023-12-11 PROCEDURE — 83735 ASSAY OF MAGNESIUM: CPT | Performed by: INTERNAL MEDICINE

## 2023-12-11 PROCEDURE — 2500000005 HC RX 250 GENERAL PHARMACY W/O HCPCS: Performed by: INTERNAL MEDICINE

## 2023-12-11 PROCEDURE — 82947 ASSAY GLUCOSE BLOOD QUANT: CPT

## 2023-12-11 PROCEDURE — 36415 COLL VENOUS BLD VENIPUNCTURE: CPT | Performed by: FAMILY MEDICINE

## 2023-12-11 RX ORDER — GUAIFENESIN/DEXTROMETHORPHAN 100-10MG/5
10 SYRUP ORAL EVERY 4 HOURS PRN
Status: CANCELLED | OUTPATIENT
Start: 2023-12-11

## 2023-12-11 RX ADMIN — ENOXAPARIN SODIUM 40 MG: 40 INJECTION SUBCUTANEOUS at 08:57

## 2023-12-11 RX ADMIN — POLYETHYLENE GLYCOL 3350 17 G: 17 POWDER, FOR SOLUTION ORAL at 08:56

## 2023-12-11 RX ADMIN — Medication 6 L/MIN: at 09:05

## 2023-12-11 RX ADMIN — DEXAMETHASONE 6 MG: 6 TABLET ORAL at 08:56

## 2023-12-11 RX ADMIN — LISINOPRIL 40 MG: 20 TABLET ORAL at 08:56

## 2023-12-11 RX ADMIN — METOPROLOL SUCCINATE 50 MG: 50 TABLET, EXTENDED RELEASE ORAL at 08:57

## 2023-12-11 RX ADMIN — LEVETIRACETAM 500 MG: 500 TABLET, FILM COATED ORAL at 08:57

## 2023-12-11 RX ADMIN — ATORVASTATIN CALCIUM 80 MG: 80 TABLET, FILM COATED ORAL at 21:24

## 2023-12-11 RX ADMIN — LEVETIRACETAM 500 MG: 500 TABLET, FILM COATED ORAL at 21:24

## 2023-12-11 RX ADMIN — PERFLUTREN 1 ML OF DILUTION: 6.52 INJECTION, SUSPENSION INTRAVENOUS at 11:20

## 2023-12-11 RX ADMIN — ASPIRIN 81 MG: 81 TABLET, COATED ORAL at 08:57

## 2023-12-11 RX ADMIN — REMDESIVIR 100 MG: 100 INJECTION, POWDER, LYOPHILIZED, FOR SOLUTION INTRAVENOUS at 11:26

## 2023-12-11 ASSESSMENT — COGNITIVE AND FUNCTIONAL STATUS - GENERAL
DAILY ACTIVITIY SCORE: 19
DRESSING REGULAR UPPER BODY CLOTHING: A LITTLE
DAILY ACTIVITIY SCORE: 18
DRESSING REGULAR LOWER BODY CLOTHING: A LITTLE
HELP NEEDED FOR BATHING: A LITTLE
STANDING UP FROM CHAIR USING ARMS: A LITTLE
DRESSING REGULAR UPPER BODY CLOTHING: A LITTLE
DAILY ACTIVITIY SCORE: 19
MOBILITY SCORE: 20
STANDING UP FROM CHAIR USING ARMS: A LITTLE
PERSONAL GROOMING: A LITTLE
MOVING TO AND FROM BED TO CHAIR: A LITTLE
WALKING IN HOSPITAL ROOM: A LITTLE
DRESSING REGULAR LOWER BODY CLOTHING: A LITTLE
DRESSING REGULAR LOWER BODY CLOTHING: A LITTLE
PERSONAL GROOMING: A LITTLE
CLIMB 3 TO 5 STEPS WITH RAILING: A LOT
TOILETING: A LITTLE
CLIMB 3 TO 5 STEPS WITH RAILING: A LITTLE
WALKING IN HOSPITAL ROOM: A LITTLE
MOBILITY SCORE: 19
CLIMB 3 TO 5 STEPS WITH RAILING: A LITTLE
STANDING UP FROM CHAIR USING ARMS: A LITTLE
PERSONAL GROOMING: A LITTLE
TOILETING: A LITTLE
DRESSING REGULAR UPPER BODY CLOTHING: A LITTLE
HELP NEEDED FOR BATHING: A LITTLE
MOVING TO AND FROM BED TO CHAIR: A LITTLE
TOILETING: A LITTLE
HELP NEEDED FOR BATHING: A LITTLE
EATING MEALS: A LITTLE
MOVING TO AND FROM BED TO CHAIR: A LITTLE
WALKING IN HOSPITAL ROOM: A LITTLE
MOBILITY SCORE: 20

## 2023-12-11 ASSESSMENT — PAIN SCALES - GENERAL
PAINLEVEL_OUTOF10: 0 - NO PAIN

## 2023-12-11 ASSESSMENT — PAIN - FUNCTIONAL ASSESSMENT
PAIN_FUNCTIONAL_ASSESSMENT: 0-10

## 2023-12-11 ASSESSMENT — PAIN SCALES - WONG BAKER: WONGBAKER_NUMERICALRESPONSE: NO HURT

## 2023-12-11 ASSESSMENT — ACTIVITIES OF DAILY LIVING (ADL): BATHING_ASSISTANCE: STAND BY

## 2023-12-11 NOTE — PROGRESS NOTES
"Occupational Therapy    Evaluation    Patient Name: Jg Starr  MRN: [id number]  Today's Date: 12/11/2023  Time Calculation  Start Time: 1241  Stop Time: 1255  Time Calculation (min): 14 min    Assessment  IP OT Assessment  End of Session Communication: Bedside nurse  End of Session Patient Position: Bed, 3 rail up  Plan:  Treatment Interventions: ADL retraining, Functional transfer training, UE strengthening/ROM, Compensatory technique education  OT Frequency: 2 times per week  OT - OK to Discharge: Yes (ok per POC)    Subjective     General:  General  Reason for Referral: 64 yo male admitted with altered mental status  Past Medical History Relevant to Rehab: PMH: HTN, CAD s/p stent, gout, CVA, intracranial aneurysm  Prior to Session Communication: PCT/NA/CTA, Bedside nurse  Patient Position Received: Bed, 3 rail up  General Comment: 3L NC, pt cooperative with mobility. Difficulty gathering PLOF and recent history due to expressive aphasia (baseline per EMR)  Precautions:  Medical Precautions: Fall precautions  Precautions Comment: Droplet + precautions    Pain:  Pain Assessment  Pain Score: 0 - No pain    Objective   Cognition:  Orientation Level:  (Unable to reliably respond to orientation quesitons, repeats \"I don't know why I'm here\")       Home Living:  Home Living Comments: Home set-up unknown. Per EMR, pt lives with spouse   Prior Function:  Prior Function Comments: Specific ADL and mobility unknown per pt report. Per EMR, pt independent and driving Michael for work    ADL:  Eating Assistance: Independent  Grooming Assistance: Stand by  Bathing Assistance: Stand by  UE Dressing Assistance: Stand by  LE Dressing Assistance: Stand by  LE Dressing Deficit:  (to don B socks at EOB)  Toileting Assistance with Device: Stand by  ADL Comments: ADL performance, aside from LB dressing, anticipated given pt's clinical presentation    Bed Mobility/Transfers: Bed Mobility  Bed Mobility: Yes  Bed Mobility 1  Bed " Mobility 1: Supine to sitting, Sitting to supine  Level of Assistance 1: Close supervision    Transfers  Transfer: Yes  Transfer 1  Technique 1: Sit to stand, Stand to sit  Transfer Level of Assistance 1: Contact guard    Ambulation/Gait Training:  Ambulation/Gait Training  Ambulation/Gait Training Performed:  (x3 steps forward and backward, 3 trials. Completed without device and CGA.)  Standing Balance:  Dynamic Standing Balance  Dynamic Standing-Balance Support: Right upper extremity supported (using window frame for support)  Dynamic Standing-Balance:  (retrieving item off the floor, from full standing)    Strength:  Strength Comments: BUE 4+/5    Outcome Measures: [facility] Daily Activity  Putting on and taking off regular lower body clothing: A little  Bathing (including washing, rinsing, drying): A little  Putting on and taking off regular upper body clothing: A little  Toileting, which includes using toilet, bedpan or urinal: A little  Taking care of personal grooming such as brushing teeth: A little  Eating Meals: A little  Daily Activity - Total Score: 18      Education Documentation  ADL Training, taught by Mally Mercer OT at 12/11/2023  2:18 PM.  Learner: Patient  Readiness: Acceptance  Method: Explanation  Response: Needs Reinforcement    Education Comments  No comments found.      Goals:   Encounter Problems       Encounter Problems (Active)       OT Goals       Pt will demo functional mobility necessary to complete ADL routine with mod I (Progressing)       Start:  12/11/23    Expected End:  12/25/23            Pt will demo functional transfers to/ from EOB, chair and commode with mod I  (Progressing)       Start:  12/11/23    Expected End:  12/25/23            Pt will demo ADL routine and meaningful daily activities indep using modifications as needed  (Progressing)       Start:  12/11/23    Expected End:  12/25/23            Pt will demo improved activity tolerance evidenced by participation in BADL  and/or functional mobility x10 mins with </=1 rest break.   (Progressing)       Start:  12/11/23    Expected End:  12/25/23

## 2023-12-11 NOTE — PROGRESS NOTES
Physical Therapy    Physical Therapy Evaluation    Patient Name: Jg Starr  MRN: [id number]  Today's Date: 12/11/2023   Time Calculation  Start Time: 1420  Stop Time: 1436  Time Calculation (min): 16 min    Assessment/Plan   PT Assessment  PT Assessment Results: Decreased endurance, Decreased mobility, Decreased cognition  Rehab Prognosis: Good  Evaluation/Treatment Tolerance: Patient tolerated treatment well  Medical Staff Made Aware: Yes  End of Session Communication: Bedside nurse  End of Session Patient Position: Bed, 3 rail up, Alarm on  IP OR SWING BED PT PLAN  Inpatient or Swing Bed: Inpatient  PT Plan  Treatment/Interventions: Bed mobility, Gait training, Transfer training, Strengthening, Therapeutic exercise  PT Plan: Skilled PT  PT Frequency: 2 times per week  PT Discharge Recommendations: Low intensity level of continued care  PT - OK to Discharge: Yes (Progressing with POC)      Subjective   General Visit Information:  General  Reason for Referral:  (64 yo male admitted 2' to confusion, AMS and COVID)  Referred By:  (Dr. ARACELI Yarbrough)  Past Medical History Relevant to Rehab:  (HTN, CAD, gout, CVA, brain aneurysm, cardiomyopathy)  Prior to Session Communication: Bedside nurse  Patient Position Received: Bed, 3 rail up, Alarm off, not on at start of session  General Comment:  (Pt supine, sleeping but easy to wake. Pt is confused not able to answer question or give any PLOF history. Pt on 3L O2. Pt is moving well and performed well physically.)  Home Living:  Home Living  Home Living Comments:  (Home set up is unknown. 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[employer])  Precautions:  Precautions  Medical Precautions: Fall precautions  Precautions Comment:  (Droplet + precautions)  Vital Signs:        Objective   Pain:  Pain Assessment  Pain Assessment: 0-10  Pain Score: 0 - No pain  Cognition:  Cognition  Overall Cognitive Status: Impaired  Orientation Level:  (Pt is unable to respond to orientation questions)    General Assessments:                               Static Standing Balance  Static Standing-Balance Support: No upper extremity supported (no device)  Static Standing-Level of Assistance: Close supervision  Dynamic Standing Balance  Dynamic Standing-Balance Support: No upper extremity supported (no device)  Dynamic Standing-Comments:  (close supervision)  Functional Assessments:  Bed Mobility 1  Bed Mobility 1: Supine to sitting, Sitting to supine  Level of Assistance 1: Close supervision    Transfers  Transfer: Yes  Transfer 1  Transfer From 1: Bed to  Transfer to 1: Stand  Technique 1: Sit to stand, Stand to sit  Transfer Device 1:  (no device)  Transfer Level of Assistance 1: Close supervision, Contact guard    Ambulation/Gait Training  Ambulation/Gait Training Performed: Yes  Ambulation/Gait Training 1  Surface 1: Level tile  Device 1: No device  Assistance 1: Close supervision, Contact guard  Quality of Gait 1: Decreased step length  Comments/Distance (ft) 1:  (5 feet forward/backward 10 times)  Extremity/Trunk Assessments:  RUE   RUE : Within Functional Limits  LUE   LUE: Within Functional Limits  RLE   RLE : Within Functional Limits  LLE   LLE : Within Functional Limits  Outcome Measures:  [facility] Basic Mobility  Turning from your back to your side while in a flat bed without using bedrails: None  Moving from lying on your back to sitting on the side of a flat bed without using bedrails: None  Moving to and from bed to chair (including a wheelchair): A little  Standing up from a chair using your arms (e.g. wheelchair or bedside chair): A little  To walk in hospital room: A little  Climbing 3-5 steps with railing: A little  Basic Mobility - Total Score: 20    Encounter Problems       Encounter  Problems (Active)       Mobility       STG - Patient will ambulate 150 feet independently with no device (Progressing)       Start:  12/11/23    Expected End:  12/25/23               Pain - Adult          Transfers       STG - Patient to transfer to and from sit to supine independently (Progressing)       Start:  12/11/23    Expected End:  12/25/23            STG - Patient will transfer sit to and from stand independently with no device (Progressing)       Start:  12/11/23    Expected End:  12/25/23                   Education Documentation  No documentation found.  Education Comments  No comments found.

## 2023-12-11 NOTE — PROGRESS NOTES
Jg Starr is a 63 y.o. male on day 3 of admission presenting with Altered mental status, unspecified altered mental status type.    Subjective   Interval History: no fever, no new complaints, the hx is not reliable         Review of Systems    Objective   Range of Vitals (last 24 hours)  Heart Rate:  [56-97]   Temp:  [36.1 °C (97 °F)-36.4 °C (97.5 °F)]   Resp:  [18-21]   BP: (109-133)/(62-82)   SpO2:  [90 %-97 %]   Daily Weight  12/08/23 : 126 kg (276 lb 14.4 oz)    Body mass index is 35.55 kg/m².    Physical Exam  Constitutional:       Appearance: Normal appearance.   HENT:      Head: Normocephalic and atraumatic.      Mouth/Throat:      Mouth: Mucous membranes are moist.      Pharynx: Oropharynx is clear.   Eyes:      Pupils: Pupils are equal, round, and reactive to light.   Cardiovascular:      Rate and Rhythm: Normal rate and regular rhythm.      Heart sounds: Normal heart sounds.   Pulmonary:      Effort: Pulmonary effort is normal.      Breath sounds: Normal breath sounds.   Abdominal:      General: Abdomen is flat. Bowel sounds are normal.      Palpations: Abdomen is soft.   Musculoskeletal:      Cervical back: Normal range of motion.   Neurological:      Mental Status: He is alert.         Antibiotics  LORazepam (Ativan) injection  - Omnicell Override Pull  LORazepam (Ativan) injection 2 mg  levETIRAcetam in NaCl (iso-os) (Keppra) IV 1,000 mg  iohexol (OMNIPaque) 350 mg iodine/mL solution 85 mL  labetaloL (Normodyne,Trandate) injection 5 mg  labetaloL (Normodyne,Trandate) injection 5 mg  labetaloL (Normodyne,Trandate) injection 10 mg  aspirin chewable tablet 81 mg  atorvastatin (Lipitor) tablet 80 mg  lisinopril tablet 40 mg  metoprolol succinate XL (Toprol-XL) 24 hr tablet 50 mg  oxygen (O2) therapy  perflutren lipid microspheres (Definity) injection 3 mL of dilution  labetaloL (Normodyne,Trandate) injection 10 mg  hydrALAZINE (Apresoline) injection 10 mg  hydrALAZINE (Apresoline) tablet 25  mg  polyethylene glycol (Glycolax, Miralax) packet 17 g  enoxaparin (Lovenox) syringe 40 mg  atorvastatin (Lipitor) tablet 80 mg  aspirin EC tablet 81 mg  acetaminophen (Tylenol) tablet 650 mg  dexAMETHasone (Decadron) tablet 6 mg  dexAMETHasone oral liquid 6 mg  dexAMETHasone (PF) (Decadron) injection 6 mg  polyethylene glycol (Glycolax, Miralax) packet 17 g  guaiFENesin (Robitussin) 100 mg/5 mL syrup 200 mg  aspirin suppository 300 mg  oxygen (O2) therapy  azithromycin (Zithromax) in dextrose 5 % in water (D5W) 250 mL  mg  cefTRIAXone (Rocephin) 2 g IV in dextrose 5% 50 mL  remdesivir (Veklury) 200 mg in sodium chloride 0.9% 250 mL IV  remdesivir (Veklury) 100 mg in sodium chloride 0.9% 250 mL IV  LORazepam (Ativan) injection 2 mg      Relevant Results  Labs  Results from last 72 hours   Lab Units 12/11/23 0544 12/10/23  0853 12/08/23 1926   WBC AUTO x10*3/uL 10.1 10.2 10.6   HEMOGLOBIN g/dL 13.6 13.0* 14.9   HEMATOCRIT % 42.9 41.9 47.1   PLATELETS AUTO x10*3/uL 295 267 237   NEUTROS PCT AUTO %  --   --  79.9   LYMPHS PCT AUTO %  --   --  7.3   MONOS PCT AUTO %  --   --  9.1   EOS PCT AUTO %  --   --  2.4       Results from last 72 hours   Lab Units 12/11/23 0544 12/10/23  0854 12/09/23  0601   SODIUM mmol/L 138 136 136   POTASSIUM mmol/L 4.0 4.2 4.7   CHLORIDE mmol/L 100 99 100   CO2 mmol/L 29 28 27   BUN mg/dL 30* 29* 22   CREATININE mg/dL 1.03 1.02 1.12   GLUCOSE mg/dL 113* 123* 163*   CALCIUM mg/dL 8.4* 8.6 8.7   ANION GAP mmol/L 13 13 14   EGFR mL/min/1.73m*2 83 84 75       Results from last 72 hours   Lab Units 12/11/23  0544 12/09/23  0601 12/08/23  1926   ALK PHOS U/L 45 63 66   BILIRUBIN TOTAL mg/dL 0.6 0.7 0.8   BILIRUBIN DIRECT mg/dL  --  0.1  --    PROTEIN TOTAL g/dL 6.8 7.5 7.7   ALT U/L 14 18 18   AST U/L 12 14 17   ALBUMIN g/dL 3.7 4.1 4.3       Estimated Creatinine Clearance: 106.2 mL/min (by C-G formula based on SCr of 1.03 mg/dL).  C-Reactive Protein   Date Value Ref Range Status    12/09/2023 2.89 (H) <1.00 mg/dL Final     Microbiology  Reviewed  Imaging  reviewed        Assessment/Plan   Respiratory failure / pneumonia / COVID19 infection, procalcitonin 0.13  Encephalopathy  Bacteremia, CNS, likely a contaminant      Recommendations :  Discontinue Azithromycin and Rocephin  Remdesivir protocol  Chest PT     I spent minutes in the professional and overall care of this patient.      Nazia Sibley MD

## 2023-12-11 NOTE — NURSING NOTE
0527am had 6 beat run of vtach, dr rojas notified n.o for labs this morning     0529 another run 24 beat vtach dr rojas notified    Pt resting in bed, denies any pain/sob

## 2023-12-11 NOTE — PROGRESS NOTES
SW received consult about son stating that he needs information about guardianship as pt lacks capacity. SW called and left a message for son Toby to answer questions and clarify relayed information. Waiting on call back.

## 2023-12-11 NOTE — PROGRESS NOTES
12/11/23 1558   Discharge Planning   Living Arrangements Spouse/significant other   Support Systems Spouse/significant other;Family members;Friends/neighbors   Assistance Needed Patient has a history of CVA with expressive aphasia but is A&O x3 baseline. PEr son, patient uses no DME at baseline and is independent with ADLs. Patient is on room air   Type of Residence Private residence   Who is requesting discharge planning? Provider   Home or Post Acute Services Other (Comment)  (TBD)   Type of Post Acute Facility Services Other (Comment)  (TBD)   Patient expects to be discharged to: TBD: SW assisting with d/c planning, possible need for guardian per provider. Currently on 02 via nasal cannula. None baseline. PT/ OT ordered and pending. Confused vs expressive aphasia currently   Does the patient need discharge transport arranged?   (TBD)     12/12/23 @ 1519: Per SW discussion with son, patient is far from baseline, prior to admit was able to communicate and driving, ambulating independently. PT/ OT eval recommends low frequency skilled services. SLP eval recommends skilled SLP 3 x week. SW following. May need guardian.  SW looking into possible IP cognitive rehab. Will follow. Discussed in interdisciplinary rounds.

## 2023-12-11 NOTE — PROGRESS NOTES
Jg Starr is a 63 y.o. male on day 3 of admission presenting with Altered mental status, unspecified altered mental status type.      Subjective   Patient resting in bed, denies any current complaints. Does not know why he is in the hospital.    Objective     Last Recorded Vitals  /82 (BP Location: Right arm, Patient Position: Sitting)   Pulse 60   Temp 36.4 °C (97.5 °F) (Temporal)   Resp 18   Wt 126 kg (276 lb 14.4 oz)   SpO2 96%   Intake/Output last 3 Shifts:    Intake/Output Summary (Last 24 hours) at 12/11/2023 1003  Last data filed at 12/11/2023 0734  Gross per 24 hour   Intake 370 ml   Output 1775 ml   Net -1405 ml         Admission Weight  Weight: 126 kg (276 lb 14.4 oz) (12/08/23 1917)    Daily Weight  12/08/23 : 126 kg (276 lb 14.4 oz)      Physical Exam  Constitutional: alert and oriented x 2, awake, cooperative, no acute distress  Skin: warm and dry  Head/Neck: Normocephalic, atraumatic  Eyes: clear sclera  ENMT: mucous membranes moist  Cardio: Regular rate and rhythm  Resp: Coarse breath sounds bilaterally  Gastrointestinal: Soft, nontender, nondistended  Extremities: No edema, cyanosis, or clubbing  Neuro: alert and oriented x 2, baseline expressive aphasia  Psychological: Appropriate mood and behavior    Relevant Results  Scheduled medications  aspirin, 81 mg, oral, Daily  aspirin, 300 mg, rectal, Once  atorvastatin, 80 mg, oral, Nightly  azithromycin, 500 mg, intravenous, q24h  cefTRIAXone, 2 g, intravenous, q24h  dexAMETHasone, 6 mg, oral, Daily   Or  dexAMETHasone, 6 mg, oral, Daily   Or  dexAMETHasone, 6 mg, intravenous, Daily  enoxaparin, 40 mg, subcutaneous, q24h  levETIRAcetam, 500 mg, oral, BID  lisinopril, 40 mg, oral, Daily  metoprolol succinate XL, 50 mg, oral, Daily  perflutren lipid microspheres, 3 mL of dilution, intravenous, Once in imaging  polyethylene glycol, 17 g, oral, Daily  remdesivir, 100 mg, intravenous, q24h      Continuous medications     PRN medications  PRN  medications: acetaminophen, guaiFENesin, [] hydrALAZINE **FOLLOWED BY** hydrALAZINE, oxygen, polyethylene glycol    Results for orders placed or performed during the hospital encounter of 23 (from the past 24 hour(s))   POCT GLUCOSE   Result Value Ref Range    POCT Glucose 134 (H) 74 - 99 mg/dL   POCT GLUCOSE   Result Value Ref Range    POCT Glucose 192 (H) 74 - 99 mg/dL   POCT GLUCOSE   Result Value Ref Range    POCT Glucose 97 74 - 99 mg/dL   CBC   Result Value Ref Range    WBC 10.1 4.4 - 11.3 x10*3/uL    nRBC 0.0 0.0 - 0.0 /100 WBCs    RBC 5.21 4.50 - 5.90 x10*6/uL    Hemoglobin 13.6 13.5 - 17.5 g/dL    Hematocrit 42.9 41.0 - 52.0 %    MCV 82 80 - 100 fL    MCH 26.1 26.0 - 34.0 pg    MCHC 31.7 (L) 32.0 - 36.0 g/dL    RDW 13.0 11.5 - 14.5 %    Platelets 295 150 - 450 x10*3/uL   Comprehensive Metabolic Panel   Result Value Ref Range    Glucose 113 (H) 74 - 99 mg/dL    Sodium 138 136 - 145 mmol/L    Potassium 4.0 3.5 - 5.3 mmol/L    Chloride 100 98 - 107 mmol/L    Bicarbonate 29 21 - 32 mmol/L    Anion Gap 13 10 - 20 mmol/L    Urea Nitrogen 30 (H) 6 - 23 mg/dL    Creatinine 1.03 0.50 - 1.30 mg/dL    eGFR 83 >60 mL/min/1.73m*2    Calcium 8.4 (L) 8.6 - 10.3 mg/dL    Albumin 3.7 3.4 - 5.0 g/dL    Alkaline Phosphatase 45 33 - 136 U/L    Total Protein 6.8 6.4 - 8.2 g/dL    AST 12 9 - 39 U/L    Bilirubin, Total 0.6 0.0 - 1.2 mg/dL    ALT 14 10 - 52 U/L   Magnesium   Result Value Ref Range    Magnesium 2.43 (H) 1.60 - 2.40 mg/dL         Assessment/Plan   64yo CM with PMH of HTN, CAD s/p stent, gout, CVA, intracranial aneurysm and medical noncompliance that presented to the ED for AMS and was admitted for acute metabolic encephalopathy and acute hypoxic respiratory failure secondary to COVID19.     Acute metabolic encephalopathy  - likely secondary to seizure vs. CVA, vs. COVID19  - MRI brain showed no acute pathology  - EEG ordered  - neuro consulted, feel this was likely seizure so would start on keppra 500mg  BID and outpt follow up in one month  - per neuro, patient cannot drive until cleared by neurology outpatient    New onset seizure  - likely in setting of COVID19 and stroke  - neuro following, start keppra 500mg BID  - patient cannot drive at discharge    Acute hypoxic respiratory failure secondary to COVID 19  - s/p NRB and airvo on first day of hospitalization  - currently on 5L O2, wean to RA as tolerated  - continue dexamethasone day 3/10  - ID following, continue Remdesivir day 3/5  - procalcitonin elevated, continue azithromycin/ceftriaxone  - encouraged IS use but unclear if patient understood well enough to perform    HTN, CAD s/p stent  - continue home ASA, statin, metoprolol, lisinopril    Hx of left MCA ischemic stroke  - continue home ASA and statin    Hx of intracranial aneurysm  - need outpt follow up with neurosurg for repeat MRA    DVT Proph: lovenox    Dispo: Patient requires close inpatient monitoring in setting of new onset seizures and acute hypoxic respiratory failure secondary to COVID19, discharge planning pending completion of Remdesivir on 12/13.         Principal Problem:    Altered mental status, unspecified altered mental status type        Alba Yarbrough DO

## 2023-12-12 LAB
ALBUMIN SERPL BCP-MCNC: 3.6 G/DL (ref 3.4–5)
ALP SERPL-CCNC: 44 U/L (ref 33–136)
ALT SERPL W P-5'-P-CCNC: 16 U/L (ref 10–52)
ANION GAP SERPL CALC-SCNC: 13 MMOL/L (ref 10–20)
AST SERPL W P-5'-P-CCNC: 12 U/L (ref 9–39)
BILIRUB DIRECT SERPL-MCNC: 0.1 MG/DL (ref 0–0.3)
BILIRUB SERPL-MCNC: 0.6 MG/DL (ref 0–1.2)
BUN SERPL-MCNC: 30 MG/DL (ref 6–23)
CALCIUM SERPL-MCNC: 8.2 MG/DL (ref 8.6–10.3)
CHLORIDE SERPL-SCNC: 103 MMOL/L (ref 98–107)
CO2 SERPL-SCNC: 27 MMOL/L (ref 21–32)
CREAT SERPL-MCNC: 0.96 MG/DL (ref 0.5–1.3)
CRP SERPL-MCNC: 0.22 MG/DL
ERYTHROCYTE [DISTWIDTH] IN BLOOD BY AUTOMATED COUNT: 12.8 % (ref 11.5–14.5)
GFR SERPL CREATININE-BSD FRML MDRD: 90 ML/MIN/1.73M*2
GLUCOSE BLD MANUAL STRIP-MCNC: 103 MG/DL (ref 74–99)
GLUCOSE SERPL-MCNC: 107 MG/DL (ref 74–99)
HCT VFR BLD AUTO: 44.5 % (ref 41–52)
HGB BLD-MCNC: 15 G/DL (ref 13.5–17.5)
MCH RBC QN AUTO: 27.3 PG (ref 26–34)
MCHC RBC AUTO-ENTMCNC: 33.7 G/DL (ref 32–36)
MCV RBC AUTO: 81 FL (ref 80–100)
NRBC BLD-RTO: 0 /100 WBCS (ref 0–0)
PLATELET # BLD AUTO: 339 X10*3/UL (ref 150–450)
POTASSIUM SERPL-SCNC: 4 MMOL/L (ref 3.5–5.3)
PROT SERPL-MCNC: 6.5 G/DL (ref 6.4–8.2)
RBC # BLD AUTO: 5.5 X10*6/UL (ref 4.5–5.9)
SODIUM SERPL-SCNC: 139 MMOL/L (ref 136–145)
WBC # BLD AUTO: 10.4 X10*3/UL (ref 4.4–11.3)

## 2023-12-12 PROCEDURE — 96372 THER/PROPH/DIAG INJ SC/IM: CPT | Performed by: INTERNAL MEDICINE

## 2023-12-12 PROCEDURE — 82248 BILIRUBIN DIRECT: CPT | Performed by: INTERNAL MEDICINE

## 2023-12-12 PROCEDURE — 92523 SPEECH SOUND LANG COMPREHEN: CPT | Mod: GN

## 2023-12-12 PROCEDURE — 2500000004 HC RX 250 GENERAL PHARMACY W/ HCPCS (ALT 636 FOR OP/ED): Performed by: INTERNAL MEDICINE

## 2023-12-12 PROCEDURE — 99233 SBSQ HOSP IP/OBS HIGH 50: CPT | Performed by: INTERNAL MEDICINE

## 2023-12-12 PROCEDURE — 2500000001 HC RX 250 WO HCPCS SELF ADMINISTERED DRUGS (ALT 637 FOR MEDICARE OP): Performed by: PSYCHIATRY & NEUROLOGY

## 2023-12-12 PROCEDURE — 86140 C-REACTIVE PROTEIN: CPT | Performed by: INTERNAL MEDICINE

## 2023-12-12 PROCEDURE — 2500000005 HC RX 250 GENERAL PHARMACY W/O HCPCS: Performed by: INTERNAL MEDICINE

## 2023-12-12 PROCEDURE — 36415 COLL VENOUS BLD VENIPUNCTURE: CPT | Performed by: FAMILY MEDICINE

## 2023-12-12 PROCEDURE — 2060000001 HC INTERMEDIATE ICU ROOM DAILY

## 2023-12-12 PROCEDURE — 85027 COMPLETE CBC AUTOMATED: CPT | Performed by: FAMILY MEDICINE

## 2023-12-12 PROCEDURE — 80053 COMPREHEN METABOLIC PANEL: CPT | Performed by: FAMILY MEDICINE

## 2023-12-12 PROCEDURE — 2500000001 HC RX 250 WO HCPCS SELF ADMINISTERED DRUGS (ALT 637 FOR MEDICARE OP): Performed by: INTERNAL MEDICINE

## 2023-12-12 PROCEDURE — 82947 ASSAY GLUCOSE BLOOD QUANT: CPT

## 2023-12-12 RX ADMIN — METOPROLOL SUCCINATE 50 MG: 50 TABLET, EXTENDED RELEASE ORAL at 10:24

## 2023-12-12 RX ADMIN — LEVETIRACETAM 500 MG: 500 TABLET, FILM COATED ORAL at 10:24

## 2023-12-12 RX ADMIN — ASPIRIN 81 MG: 81 TABLET, COATED ORAL at 10:24

## 2023-12-12 RX ADMIN — ATORVASTATIN CALCIUM 80 MG: 80 TABLET, FILM COATED ORAL at 20:55

## 2023-12-12 RX ADMIN — DEXAMETHASONE 6 MG: 6 TABLET ORAL at 10:24

## 2023-12-12 RX ADMIN — ENOXAPARIN SODIUM 40 MG: 40 INJECTION SUBCUTANEOUS at 10:23

## 2023-12-12 RX ADMIN — LISINOPRIL 40 MG: 20 TABLET ORAL at 10:24

## 2023-12-12 RX ADMIN — REMDESIVIR 100 MG: 100 INJECTION, POWDER, LYOPHILIZED, FOR SOLUTION INTRAVENOUS at 11:03

## 2023-12-12 RX ADMIN — LEVETIRACETAM 500 MG: 500 TABLET, FILM COATED ORAL at 20:55

## 2023-12-12 ASSESSMENT — COGNITIVE AND FUNCTIONAL STATUS - GENERAL
HELP NEEDED FOR BATHING: A LITTLE
TOILETING: A LITTLE
PERSONAL GROOMING: A LITTLE
WALKING IN HOSPITAL ROOM: A LITTLE
MOVING TO AND FROM BED TO CHAIR: A LITTLE
DRESSING REGULAR UPPER BODY CLOTHING: A LITTLE
DRESSING REGULAR LOWER BODY CLOTHING: A LITTLE
CLIMB 3 TO 5 STEPS WITH RAILING: A LITTLE
DAILY ACTIVITIY SCORE: 19
STANDING UP FROM CHAIR USING ARMS: A LITTLE
MOBILITY SCORE: 20

## 2023-12-12 ASSESSMENT — PAIN - FUNCTIONAL ASSESSMENT
PAIN_FUNCTIONAL_ASSESSMENT: UNABLE TO SELF-REPORT
PAIN_FUNCTIONAL_ASSESSMENT: 0-10
PAIN_FUNCTIONAL_ASSESSMENT: 0-10

## 2023-12-12 ASSESSMENT — PAIN SCALES - GENERAL
PAINLEVEL_OUTOF10: 0 - NO PAIN
PAINLEVEL_OUTOF10: 0 - NO PAIN

## 2023-12-12 ASSESSMENT — PAIN SCALES - WONG BAKER: WONGBAKER_NUMERICALRESPONSE: NO HURT

## 2023-12-12 NOTE — CARE PLAN
The patient's goals for the shift include      The clinical goals for the shift include patient will maintain pulse ox of 92% or higher throughout shift    Over the shift, the patient did not make progress toward the following goals. Barriers to progression inclu. Recommendations to address these barriers include .  Patient remained on O2 3L nc throughout shift. SpO2 remained above 90%

## 2023-12-12 NOTE — PROGRESS NOTES
gJ Starr is a 63 y.o. male on day 4 of admission presenting with Altered mental status, unspecified altered mental status type.    Subjective   Interval History: no fever, no new complaints, the hx is not reliable         Review of Systems    Objective   Range of Vitals (last 24 hours)  Heart Rate:  [58-69]   Temp:  [36.4 °C (97.5 °F)-37.9 °C (100.2 °F)]   Resp:  [19-22]   BP: (124-172)/(74-97)   SpO2:  [92 %-96 %]   Daily Weight  12/08/23 : 126 kg (276 lb 14.4 oz)    Body mass index is 35.55 kg/m².    Physical Exam  Constitutional:       Appearance: Normal appearance.   HENT:      Head: Normocephalic and atraumatic.      Mouth/Throat:      Mouth: Mucous membranes are moist.      Pharynx: Oropharynx is clear.   Eyes:      Pupils: Pupils are equal, round, and reactive to light.   Cardiovascular:      Rate and Rhythm: Normal rate and regular rhythm.      Heart sounds: Normal heart sounds.   Pulmonary:      Effort: Pulmonary effort is normal.      Breath sounds: Normal breath sounds.   Abdominal:      General: Abdomen is flat. Bowel sounds are normal.      Palpations: Abdomen is soft.   Musculoskeletal:      Cervical back: Normal range of motion.   Neurological:      Mental Status: He is alert.         Antibiotics  LORazepam (Ativan) injection  - Omnicell Override Pull  LORazepam (Ativan) injection 2 mg  levETIRAcetam in NaCl (iso-os) (Keppra) IV 1,000 mg  iohexol (OMNIPaque) 350 mg iodine/mL solution 85 mL  labetaloL (Normodyne,Trandate) injection 5 mg  labetaloL (Normodyne,Trandate) injection 5 mg  labetaloL (Normodyne,Trandate) injection 10 mg  aspirin chewable tablet 81 mg  atorvastatin (Lipitor) tablet 80 mg  lisinopril tablet 40 mg  metoprolol succinate XL (Toprol-XL) 24 hr tablet 50 mg  oxygen (O2) therapy  perflutren lipid microspheres (Definity) injection 3 mL of dilution  labetaloL (Normodyne,Trandate) injection 10 mg  hydrALAZINE (Apresoline) injection 10 mg  hydrALAZINE (Apresoline) tablet 25  mg  polyethylene glycol (Glycolax, Miralax) packet 17 g  enoxaparin (Lovenox) syringe 40 mg  atorvastatin (Lipitor) tablet 80 mg  aspirin EC tablet 81 mg  acetaminophen (Tylenol) tablet 650 mg  dexAMETHasone (Decadron) tablet 6 mg  dexAMETHasone oral liquid 6 mg  dexAMETHasone (PF) (Decadron) injection 6 mg  polyethylene glycol (Glycolax, Miralax) packet 17 g  guaiFENesin (Robitussin) 100 mg/5 mL syrup 200 mg  aspirin suppository 300 mg  oxygen (O2) therapy  azithromycin (Zithromax) in dextrose 5 % in water (D5W) 250 mL  mg  cefTRIAXone (Rocephin) 2 g IV in dextrose 5% 50 mL  remdesivir (Veklury) 200 mg in sodium chloride 0.9% 250 mL IV  remdesivir (Veklury) 100 mg in sodium chloride 0.9% 250 mL IV  LORazepam (Ativan) injection 2 mg      Relevant Results  Labs  Results from last 72 hours   Lab Units 12/12/23  0616 12/11/23  0544 12/10/23  0853   WBC AUTO x10*3/uL 10.4 10.1 10.2   HEMOGLOBIN g/dL 15.0 13.6 13.0*   HEMATOCRIT % 44.5 42.9 41.9   PLATELETS AUTO x10*3/uL 339 295 267       Results from last 72 hours   Lab Units 12/12/23  0616 12/11/23  0544 12/10/23  0854   SODIUM mmol/L 139 138 136   POTASSIUM mmol/L 4.0 4.0 4.2   CHLORIDE mmol/L 103 100 99   CO2 mmol/L 27 29 28   BUN mg/dL 30* 30* 29*   CREATININE mg/dL 0.96 1.03 1.02   GLUCOSE mg/dL 107* 113* 123*   CALCIUM mg/dL 8.2* 8.4* 8.6   ANION GAP mmol/L 13 13 13   EGFR mL/min/1.73m*2 90 83 84       Results from last 72 hours   Lab Units 12/12/23  0616 12/11/23  0544   ALK PHOS U/L 44 45   BILIRUBIN TOTAL mg/dL 0.6 0.6   BILIRUBIN DIRECT mg/dL 0.1  --    PROTEIN TOTAL g/dL 6.5 6.8   ALT U/L 16 14   AST U/L 12 12   ALBUMIN g/dL 3.6 3.7       Estimated Creatinine Clearance: 114 mL/min (by C-G formula based on SCr of 0.96 mg/dL).  C-Reactive Protein   Date Value Ref Range Status   12/12/2023 0.22 <1.00 mg/dL Final   12/09/2023 2.89 (H) <1.00 mg/dL Final     Microbiology  Reviewed  Imaging  reviewed        Assessment/Plan   Respiratory failure / pneumonia /  COVID19 infection, procalcitonin 0.13  Encephalopathy, likely metabolic  Bacteremia, CNS / psychrobacter, likely a contaminant      Recommendations :  Remdesivir protocol  Chest PT     I spent minutes in the professional and overall care of this patient.      Nazia Sibley MD

## 2023-12-12 NOTE — CARE PLAN
Called to son, Toby today re: possible guardianship needs and discharge planning.  Toby tells me, after pt's last CVA, pt came home with him and eventually was cleared to live independently, drive, etc.  Son states there is no POA.  Son has no way of helping pt pay bills etc, and to assist in medical decision-making.  Son is considering guardianship and would need a Expert Eval completed.  So states pt would not be able to come home with him this time because there is no way to communicate with pt due to fluent expressive aphasia with severe receptive aphasia.  Son would also like to speak with MD.  SW will assist with discharge planning, possibly brain injury rehab?  Not sure if a inpt rehab will accept pt as he does not need inpt PT/OT.  Looking into options.

## 2023-12-12 NOTE — CARE PLAN
The patient's goals for the shift include      The clinical goals for the shift include Paitient will maintain O2 sats at 92% or above this shift    Over the shift, the patient remained safe, had clearer speech, demonstrated slightly better understanding and expression.

## 2023-12-12 NOTE — PROGRESS NOTES
Speech-Language Pathology    SLP ADULT Inpatient Speech-Language Cognition    Patient Name: Jg Starr  MRN: [id number]  Today's Date: 12/12/2023   Time Calculation  Start Time: 1232  Stop Time: 1257  Time Calculation (min): 25 min         Current Problem:   1. Altered mental status, unspecified altered mental status type        2. Seizure-like activity (CMS/HCC)        3. Saccular aneurysm        4. COVID-19        5. Hypertensive emergency        6. Cerebrovascular accident (CVA), unspecified mechanism (CMS/HCC)  Transthoracic Echo (TTE) Complete    Transthoracic Echo (TTE) Complete        Stroke with aphasia in March of 2023. Seizure with exacerbation of language impairment.     SLP Assessment:  SLP Assessment  SLP Assessment Results: Receptive Comprehension deficits, Executive function deficits, Expression deficits  Prognosis: Fair  Treatment Provided: No  Treatment Tolerance: Treatment limited secondary to agitation    Subjective   Current Problem:  Impaired functional communication.  Awake, cooperative, pleasant for this session.     Most Recent Visit:  SLP Most Recent Visit  SLP Received On: 12/12/23    General Visit Information:  General Information  Caregiver Feedback: Per nursing, pt functioning is not at baseline.  Reason for Referral: speech/ language/ cognitive evaluation  Referred By: Dr. Lin    Objective   Pain:  Pain Assessment  Pain Assessment: Unable to self-report      Cognition:  Cognition  Overall Cognitive Status: Impaired- Unable to participate in functional cognitive assessment due to aphasia.   Arousal/Alertness: Appropriate responses to stimuli  Orientation Level: Unable to assess, Other (Comment) (Significant receptive and expressive aphasia.)  Following Commands: Other (Comment)  Awareness of Deficits: Decreased Awareness of Deficits (Max assist and visual cues required to follow one step commands.)  Attention Span: Unable to assess  Problem Solving: Unable to assess  Cognition  "Comments: Pleasant and conversational with fluent responses, \"Yea, yea, I'd like to know when I will be going home\" in response to simple questions and commands. Answers unrelated to stim material.  Attention:  (Cooperative, but dismissive of evaluation attempts.)  Memory: Unable to assess    Motor Speech Production:  Motor Speech Production  Oral Motor : WFL  Automatic Speech: Other (Comment) (Conversational responses only. Did not verbalize requested sequences/ automatic sentences.)  Repetition: Impaired  Intelligibility: WFL  Diadochokinetic Rate: WFL  Paralinguistic Features: WFL  Respiratory Support: WFL      Auditory Comprehension:   Auditory Comprehension  Yes/No Questions: Impaired  Basic Questions: appropriate responses on fewer than 10% of conversational questions.  Commands: Impaired  One Step Basic Commands: Impaired    Verbal:  Verbal Expression  Primary Mode of Expression: Verbal  Primary Language: English  Confrontation Naming: Impaired  Confrontation Objects:  (20% accuracy)  Responsive Naming: Impaired  Responsive Naming Comments: 0%  Affect: Within Functional Limits  Eye Contact: Within Functional Limits  Topic Initiation: Unable to assess      Written Expression:  Written Expression  Written Expression: Unable to assess    Assessment:   Severe functional communication impairment with diminished awareness of his environment. Instead of participation in evaluation tasks, the patient repeated statements like, \"I just want to know when I'm going home.\"    SLP Plan:  Plan  Treatment/Interventions: Communication functioning, Executive functioning, Expressive Language, Receptive Language  SLP Plan: Skilled SLP  SLP Frequency: 3x per week  Duration: Current admission  Discussed POC: Patient, Nursing  Discussed Risks/Benefits: Yes  Patient/Caregiver Agreeable: Yes    Goals:  Pt will participate in assessment of functional communication with min assist.   Pt will respond to simple commands with min assist " "and 90% accuracy.  Pt will verbalize appropriately to questions with min assist 90% to improve functional communication of wants and needs.    Education:  Learner patient;    Barriers to Learning cognitive limitations barrier; communication limitations barrier   Method demonstration; verbal; written- (Written \"Swallowing Guidelines\" posted at patient's bedside)   Education - Topic ST provided patient education regarding role of ST, purpose of assessment, clinical impressions, goals of treatment, and plan of care. ST further coordinated with RN regarding recommendations and precautions per this assessment, with RN verbalizing understanding.     Outcome    unable to meet; partially meets; meets goals/outcomes       "

## 2023-12-12 NOTE — PROGRESS NOTES
Jg Starr is a 63 y.o. male on day 4 of admission presenting with Altered mental status, unspecified altered mental status type.      Subjective   Says he is ok. Sleepy, tired       Objective     Last Recorded Vitals  /72 (Patient Position: Sitting)   Pulse 74   Temp 36.3 °C (97.3 °F) (Temporal)   Resp 20   Wt 126 kg (276 lb 14.4 oz)   SpO2 94%   Intake/Output last 3 Shifts:    Intake/Output Summary (Last 24 hours) at 12/12/2023 1136  Last data filed at 12/11/2023 1627  Gross per 24 hour   Intake --   Output 775 ml   Net -775 ml       Admission Weight  Weight: 126 kg (276 lb 14.4 oz) (12/08/23 1917)    Daily Weight  12/08/23 : 126 kg (276 lb 14.4 oz)    Image Results  Transthoracic Echo (TTE) [facility], [address] [place] [address]                Tel [phone] and Fax [phone]    TRANSTHORACIC ECHOCARDIOGRAM REPORT       Patient Name:      JG STARR     Reading Physician:    [id number] Darren Chance MD  Study Date:        12/11/2023           Ordering Provider:    [id number] CRISTAL PRATER  MRN/PID:           [id number]             Fellow:  Accession#:        [id number]         Nurse:                Crys Hernandez RN  Date of Birth/Age: 1960 / 63 years Sonographer:          Olivia Morrissey RDCS  Gender:            M                    Additional Staff:  Height:            187.96 cm            Admit Date:           12/8/2023  Weight:            125.19 kg            Admission Status:     Inpatient -                                                                Routine  BSA:               2.49 m2              Encounter#:           [id number]                                          Department  Location:  [address]  Blood Pressure: 114 /62 mmHg    Study Type:    TRANSTHORACIC ECHO (TTE) COMPLETE  Diagnosis/ICD: Cerebral Infarction, unspecified-I63.9  Indication:    Stroke  CPT Code:      Echo Complete w Full Doppler-34208    Patient History:  Pertinent History: HTN, CAD, CVA and ISCH. CMO, DM.    Study Detail: The following Echo studies were performed: 2D, Doppler, M-Mode and                color flow. Technically challenging study due to body habitus,                patient lying in supine position and COVID protocol. Definity used                as a contrast agent for endocardial border definition and agitated                saline used as a contrast agent for intraseptal flow evaluation.                Total contrast used for this procedure was 2.0 mL via IV push.                Unable to obtain subcostal, suprasternal notch and 4-ch LA view.                The patient was awake.       PHYSICIAN INTERPRETATION:  Left Ventricle: The left ventricular systolic function is normal, with an estimated ejection fraction of 40-45%. There are no regional wall motion abnormalities. The left ventricular cavity size is normal. Spectral Doppler shows a pseudonormal pattern of left ventricular diastolic filling.  LV Wall Scoring:  The apical septal segment and apex are hypokinetic.    Left Atrium: The left atrium is normal in size. There is no evidence of a patent foramen ovale.  Right Ventricle: The right ventricle is normal in size. There is normal right ventricular global systolic function.  Right Atrium: The right atrium is normal in size.  Aortic Valve: The aortic valve was not well visualized. There is mild aortic valve regurgitation. The peak instantaneous gradient of the aortic valve is 4.2 mmHg. The mean gradient of the aortic valve is 2.0 mmHg.  Mitral Valve: The mitral valve is normal in structure. There is trace to mild mitral valve regurgitation.  Tricuspid Valve: The tricuspid valve is structurally  normal. There is trace to mild tricuspid regurgitation.  Pulmonic Valve: The pulmonic valve is not well visualized. The pulmonic valve regurgitation was not well visualized.  Pericardium: There is no pericardial effusion noted.  Aorta: The aortic root is normal.  Pulmonary Artery: The tricuspid regurgitant velocity is 1.35 m/s, and with an estimated right atrial pressure of 10 mmHg, the estimated pulmonary artery pressure is normal with the RVSP at 17.3 mmHg.  Systemic Veins: The inferior vena cava appears to be of normal size.       CONCLUSIONS:   1. Left ventricular systolic function is normal with a 40-45% estimated ejection fraction.   2. Apical septal segment and apex are abnormal.   3. Spectral Doppler shows a pseudonormal pattern of left ventricular diastolic filling.   4. Mild aortic valve regurgitation.   5. There is no evidence of a patent foramen ovale.    QUANTITATIVE DATA SUMMARY:  2D MEASUREMENTS:                            Normal Ranges:  IVSd:          1.35 cm    (0.6-1.1cm)  LVPWd:         1.19 cm    (0.6-1.1cm)  LVIDd:         5.35 cm    (3.9-5.9cm)  LVIDs:         3.98 cm  LV Mass Index: 113.1 g/m2  LV % FS        25.6 %    LA VOLUME:                                Normal Ranges:  LA Vol A2C:        42.7 ml  LA Vol Index A2C:  17.1 ml/m2  LA Area A2C:       16.0 cm2  LA Major Axis A2C: 5.1 cm  LA Vol A2C:        40.0 ml    LV SYSTOLIC FUNCTION BY 2D PLANIMETRY (MOD):                      Normal Ranges:  EF-A4C View: 45.5 % (>=55%)  EF-A2C View: 41.9 %  EF-Biplane:  42.0 %    LV DIASTOLIC FUNCTION:                         Normal Ranges:  MV Peak E:    0.66 m/s (0.7-1.2 m/s)  MV Peak A:    0.52 m/s (0.42-0.7 m/s)  E/A Ratio:    1.27     (1.0-2.2)  MV e'         0.06 m/s (>8.0)  MV lateral e' 0.05 m/s  MV medial e'  0.06 m/s  E/e' Ratio:   10.93    (<8.0)    MITRAL VALVE:                  Normal Ranges:  MV DT: 222 msec (150-240msec)    AORTIC VALVE:                                    Normal  Ranges:  AoV Vmax:                1.02 m/s (<=1.7m/s)  AoV Peak P.2 mmHg (<20mmHg)  AoV Mean P.0 mmHg (1.7-11.5mmHg)  LVOT Max Anders:            0.90 m/s (<=1.1m/s)  AoV VTI:                 20.70 cm (18-25cm)  LVOT VTI:                17.50 cm  LVOT Diameter:           2.40 cm  (1.8-2.4cm)  AoV Area, VTI:           3.82 cm2 (2.5-5.5cm2)  AoV Area,Vmax:           3.97 cm2 (2.5-4.5cm2)  AoV Dimensionless Index: 0.85    TRICUSPID VALVE/RVSP:                              Normal Ranges:  Peak TR Velocity: 1.35 m/s  RV Syst Pressure: 17.3 mmHg (< 30mmHg)    PULMONIC VALVE:                       Normal Ranges:  PV Max Anders: 0.6 m/s  (0.6-0.9m/s)  PV Max P.2 mmHg       [id number] Darren Chance MD  Electronically signed on 2023 at 5:12:53 PM       Wall Scoring       ** Final **    Results for orders placed or performed during the hospital encounter of 23 (from the past 24 hour(s))   Transthoracic Echo (TTE) Complete   Result Value Ref Range    AV pk anders 1.02     AV mn grad 2.0     LVOT diam 2.40     LV biplane EF 42     MV avg E/e' ratio 10.93     MV E/A ratio 1.27     LVIDd 5.35     RVSP 17.3     Aortic Valve Area by Continuity of VTI 3.82     Aortic Valve Area by Continuity of Peak Velocity 3.97     AV pk grad 4.2     LV A4C EF 45.5    POCT GLUCOSE   Result Value Ref Range    POCT Glucose 159 (H) 74 - 99 mg/dL   POCT GLUCOSE   Result Value Ref Range    POCT Glucose 158 (H) 74 - 99 mg/dL   Basic Metabolic Panel   Result Value Ref Range    Glucose 107 (H) 74 - 99 mg/dL    Sodium 139 136 - 145 mmol/L    Potassium 4.0 3.5 - 5.3 mmol/L    Chloride 103 98 - 107 mmol/L    Bicarbonate 27 21 - 32 mmol/L    Anion Gap 13 10 - 20 mmol/L    Urea Nitrogen 30 (H) 6 - 23 mg/dL    Creatinine 0.96 0.50 - 1.30 mg/dL    eGFR 90 >60 mL/min/1.73m*2    Calcium 8.2 (L) 8.6 - 10.3 mg/dL   CBC   Result Value Ref Range    WBC 10.4 4.4 - 11.3 x10*3/uL    nRBC 0.0 0.0 - 0.0 /100 WBCs    RBC 5.50 4.50 - 5.90  x10*6/uL    Hemoglobin 15.0 13.5 - 17.5 g/dL    Hematocrit 44.5 41.0 - 52.0 %    MCV 81 80 - 100 fL    MCH 27.3 26.0 - 34.0 pg    MCHC 33.7 32.0 - 36.0 g/dL    RDW 12.8 11.5 - 14.5 %    Platelets 339 150 - 450 x10*3/uL   C-reactive protein   Result Value Ref Range    C-Reactive Protein 0.22 <1.00 mg/dL   Hepatic function panel   Result Value Ref Range    Albumin 3.6 3.4 - 5.0 g/dL    Bilirubin, Total 0.6 0.0 - 1.2 mg/dL    Bilirubin, Direct 0.1 0.0 - 0.3 mg/dL    Alkaline Phosphatase 44 33 - 136 U/L    ALT 16 10 - 52 U/L    AST 12 9 - 39 U/L    Total Protein 6.5 6.4 - 8.2 g/dL   POCT GLUCOSE   Result Value Ref Range    POCT Glucose 103 (H) 74 - 99 mg/dL      Current Facility-Administered Medications:     acetaminophen (Tylenol) tablet 650 mg, 650 mg, oral, q4h PRN, Bi Elliott MD    aspirin EC tablet 81 mg, 81 mg, oral, Daily, Bi Elliott MD, 81 mg at 23 1024    aspirin suppository 300 mg, 300 mg, rectal, Once, Bi Elliott MD    atorvastatin (Lipitor) tablet 80 mg, 80 mg, oral, Nightly, Bi Elliott MD, 80 mg at 23 2124    dexAMETHasone (Decadron) tablet 6 mg, 6 mg, oral, Daily, 6 mg at 23 1024 **OR** dexAMETHasone oral liquid 6 mg, 6 mg, oral, Daily **OR** dexAMETHasone (PF) (Decadron) injection 6 mg, 6 mg, intravenous, Daily, Bi Elliott MD, 6 mg at 23 0225    enoxaparin (Lovenox) syringe 40 mg, 40 mg, subcutaneous, q24h, Bi Elliott MD, 40 mg at 23 1023    guaiFENesin (Robitussin) 100 mg/5 mL syrup 200 mg, 200 mg, oral, q4h PRN, Bi Elliott MD    [] hydrALAZINE (Apresoline) injection 10 mg, 10 mg, intravenous, q20 min PRN **FOLLOWED BY** hydrALAZINE (Apresoline) tablet 25 mg, 25 mg, oral, q6h PRN, Bi Elliott MD    levETIRAcetam (Keppra) tablet 500 mg, 500 mg, oral, BID, Stas Garcia MD, 500 mg at 23 1024    lisinopril tablet 40 mg, 40 mg, oral, Daily, Bi Elliott MD, 40 mg at 23 1024    metoprolol  succinate XL (Toprol-XL) 24 hr tablet 50 mg, 50 mg, oral, Daily, Bi Elliott MD, 50 mg at 12/12/23 1024    oxygen (O2) therapy, , inhalation, Continuous PRN - O2/gases, Alba Yarbrough,     polyethylene glycol (Glycolax, Miralax) packet 17 g, 17 g, oral, Daily, Bi Elliott MD, 17 g at 12/11/23 0856    polyethylene glycol (Glycolax, Miralax) packet 17 g, 17 g, oral, Daily PRN, Bi Elliott MD    [COMPLETED] remdesivir (Veklury) 200 mg in sodium chloride 0.9% 250 mL IV, 200 mg, intravenous, Once, 200 mg at 12/09/23 1518 **FOLLOWED BY** remdesivir (Veklury) 100 mg in sodium chloride 0.9% 250 mL IV, 100 mg, intravenous, q24h, Nazia Sibley MD, 100 mg at 12/12/23 1103   Physical Exam  Constitutional:       Appearance: Normal appearance.   HENT:      Head: Normocephalic and atraumatic.      Nose: Nose normal.      Mouth/Throat:      Mouth: Mucous membranes are dry.   Eyes:      Extraocular Movements: Extraocular movements intact.      Pupils: Pupils are equal, round, and reactive to light.   Neck:      Comments: No jvd  Cardiovascular:      Rate and Rhythm: Normal rate and regular rhythm.      Pulses: Normal pulses.   Pulmonary:      Effort: Pulmonary effort is normal.      Breath sounds: Normal breath sounds.   Abdominal:      General: Bowel sounds are normal.      Palpations: Abdomen is soft.   Musculoskeletal:      Comments: Good distal pulses. No distal hair growth.   No pitting edema.    Skin:     General: Skin is warm and dry.   Neurological:      Mental Status: He is alert.      Comments: Awakened easily. Cranial nerves appear intact.  Severe aphasia, possibly expressive and receptive--only able to occasionally follow a command. Repeats that he is ok. Unable to have meaningful conversation   Psychiatric:         Mood and Affect: Mood normal.         Behavior: Behavior normal.         Relevant Results             Assessment/Plan              Principal Problem:    Altered mental status,  unspecified altered mental status type    COVID--low grade temp yesterday, nothing today yet. Day 4 of remdes/decadron. Appreciate ID input. Hypoxic respiratory failure, on 3-4 liters currently, wean as able. OOB etc.   Seizure , new onset. He has been loaded with keppra. Will check level. Neuro on board. EEG ordered.   Encephalopathy--per family, not even close to baseline at this time . Will request speech eval also. Would seem that if this is from the seizure and/or covid, he would be improving by now. Known old mca infarct, aneurysm would not be causing this issue, and is stable per report. Will need further neuro input  Hypertension, stable  CAD, appears stable. Does have mild ICM, with EF 40-45%, apical hypokinesis. On med regimen now. ? Of non compliance              Lorie Lin MD

## 2023-12-13 LAB
ANION GAP SERPL CALC-SCNC: 12 MMOL/L (ref 10–20)
BACTERIA BLD AEROBE CULT: ABNORMAL
BACTERIA BLD CULT: ABNORMAL
BACTERIA BLD CULT: NORMAL
BACTERIA BLD CULT: NORMAL
BUN SERPL-MCNC: 31 MG/DL (ref 6–23)
CALCIUM SERPL-MCNC: 8.2 MG/DL (ref 8.6–10.3)
CHLORIDE SERPL-SCNC: 104 MMOL/L (ref 98–107)
CO2 SERPL-SCNC: 26 MMOL/L (ref 21–32)
CREAT SERPL-MCNC: 1 MG/DL (ref 0.5–1.3)
GFR SERPL CREATININE-BSD FRML MDRD: 86 ML/MIN/1.73M*2
GLUCOSE SERPL-MCNC: 112 MG/DL (ref 74–99)
GRAM STN SPEC: ABNORMAL
GRAM STN SPEC: ABNORMAL
LEVETIRACETAM SERPL-MCNC: 13 UG/ML (ref 10–40)
POTASSIUM SERPL-SCNC: 4 MMOL/L (ref 3.5–5.3)
SODIUM SERPL-SCNC: 138 MMOL/L (ref 136–145)

## 2023-12-13 PROCEDURE — 2500000001 HC RX 250 WO HCPCS SELF ADMINISTERED DRUGS (ALT 637 FOR MEDICARE OP): Performed by: PSYCHIATRY & NEUROLOGY

## 2023-12-13 PROCEDURE — 2500000004 HC RX 250 GENERAL PHARMACY W/ HCPCS (ALT 636 FOR OP/ED): Performed by: INTERNAL MEDICINE

## 2023-12-13 PROCEDURE — 80048 BASIC METABOLIC PNL TOTAL CA: CPT | Performed by: INTERNAL MEDICINE

## 2023-12-13 PROCEDURE — 2500000001 HC RX 250 WO HCPCS SELF ADMINISTERED DRUGS (ALT 637 FOR MEDICARE OP): Performed by: INTERNAL MEDICINE

## 2023-12-13 PROCEDURE — 36415 COLL VENOUS BLD VENIPUNCTURE: CPT | Performed by: INTERNAL MEDICINE

## 2023-12-13 PROCEDURE — 80177 DRUG SCRN QUAN LEVETIRACETAM: CPT | Mod: GEALAB | Performed by: INTERNAL MEDICINE

## 2023-12-13 PROCEDURE — 2500000005 HC RX 250 GENERAL PHARMACY W/O HCPCS: Performed by: INTERNAL MEDICINE

## 2023-12-13 PROCEDURE — 97110 THERAPEUTIC EXERCISES: CPT | Mod: GP

## 2023-12-13 PROCEDURE — 2060000001 HC INTERMEDIATE ICU ROOM DAILY

## 2023-12-13 PROCEDURE — 97116 GAIT TRAINING THERAPY: CPT | Mod: GP

## 2023-12-13 PROCEDURE — 96372 THER/PROPH/DIAG INJ SC/IM: CPT | Performed by: INTERNAL MEDICINE

## 2023-12-13 PROCEDURE — 92507 TX SP LANG VOICE COMM INDIV: CPT | Mod: GN

## 2023-12-13 PROCEDURE — 99233 SBSQ HOSP IP/OBS HIGH 50: CPT | Performed by: PSYCHIATRY & NEUROLOGY

## 2023-12-13 PROCEDURE — 99232 SBSQ HOSP IP/OBS MODERATE 35: CPT | Performed by: INTERNAL MEDICINE

## 2023-12-13 RX ORDER — LEVETIRACETAM 500 MG/1
750 TABLET ORAL 2 TIMES DAILY
Status: DISCONTINUED | OUTPATIENT
Start: 2023-12-13 | End: 2023-12-19 | Stop reason: HOSPADM

## 2023-12-13 RX ADMIN — LEVETIRACETAM 500 MG: 500 TABLET, FILM COATED ORAL at 09:18

## 2023-12-13 RX ADMIN — LISINOPRIL 40 MG: 20 TABLET ORAL at 09:17

## 2023-12-13 RX ADMIN — ASPIRIN 81 MG: 81 TABLET, COATED ORAL at 09:18

## 2023-12-13 RX ADMIN — ENOXAPARIN SODIUM 40 MG: 40 INJECTION SUBCUTANEOUS at 09:18

## 2023-12-13 RX ADMIN — ATORVASTATIN CALCIUM 80 MG: 80 TABLET, FILM COATED ORAL at 20:27

## 2023-12-13 RX ADMIN — DEXAMETHASONE 6 MG: 6 TABLET ORAL at 09:17

## 2023-12-13 RX ADMIN — REMDESIVIR 100 MG: 100 INJECTION, POWDER, LYOPHILIZED, FOR SOLUTION INTRAVENOUS at 11:42

## 2023-12-13 RX ADMIN — LEVETIRACETAM 750 MG: 500 TABLET, FILM COATED ORAL at 21:00

## 2023-12-13 RX ADMIN — METOPROLOL SUCCINATE 50 MG: 50 TABLET, EXTENDED RELEASE ORAL at 09:17

## 2023-12-13 ASSESSMENT — COGNITIVE AND FUNCTIONAL STATUS - GENERAL
DRESSING REGULAR UPPER BODY CLOTHING: A LITTLE
MOBILITY SCORE: 18
EATING MEALS: A LITTLE
CLIMB 3 TO 5 STEPS WITH RAILING: A LITTLE
TOILETING: A LITTLE
MOVING TO AND FROM BED TO CHAIR: A LITTLE
DAILY ACTIVITIY SCORE: 18
TURNING FROM BACK TO SIDE WHILE IN FLAT BAD: A LITTLE
STANDING UP FROM CHAIR USING ARMS: A LITTLE
DAILY ACTIVITIY SCORE: 24
WALKING IN HOSPITAL ROOM: A LITTLE
STANDING UP FROM CHAIR USING ARMS: A LITTLE
MOBILITY SCORE: 20
PERSONAL GROOMING: A LITTLE
DRESSING REGULAR LOWER BODY CLOTHING: A LITTLE
MOBILITY SCORE: 24
MOVING TO AND FROM BED TO CHAIR: A LITTLE
CLIMB 3 TO 5 STEPS WITH RAILING: A LITTLE
MOVING FROM LYING ON BACK TO SITTING ON SIDE OF FLAT BED WITH BEDRAILS: A LITTLE
HELP NEEDED FOR BATHING: A LITTLE
WALKING IN HOSPITAL ROOM: A LITTLE

## 2023-12-13 ASSESSMENT — PAIN - FUNCTIONAL ASSESSMENT
PAIN_FUNCTIONAL_ASSESSMENT: 0-10

## 2023-12-13 ASSESSMENT — PAIN SCALES - GENERAL
PAINLEVEL_OUTOF10: 0 - NO PAIN

## 2023-12-13 NOTE — CARE PLAN
The patient's goals for the shift include      The clinical goals for the shift include Pt will noot fall this shift    Over the shift, the patient did not make progress toward the following goals. Barriers to progression include the patient. Recommendations to address these barriers include keep reeducating patient on falls protocol.

## 2023-12-13 NOTE — PROGRESS NOTES
Jg Starr is a 63 y.o. male on day 5 of admission presenting with Altered mental status, unspecified altered mental status type.      Subjective   Says he feels fine. Wants to go home. Sleeping, eating fine per patient       Objective     Last Recorded Vitals  /83 (BP Location: Right arm, Patient Position: Lying)   Pulse 61   Temp 36 °C (96.8 °F) (Temporal)   Resp 16   Wt 126 kg (276 lb 14.4 oz)   SpO2 98%   Intake/Output last 3 Shifts:    Intake/Output Summary (Last 24 hours) at 12/13/2023 0925  Last data filed at 12/13/2023 0855  Gross per 24 hour   Intake 940 ml   Output 950 ml   Net -10 ml       Admission Weight  Weight: 126 kg (276 lb 14.4 oz) (12/08/23 1917)    Daily Weight  12/08/23 : 126 kg (276 lb 14.4 oz)    Image Results  Transthoracic Echo (TTE) [facility], [address] [place] [address]                Tel [phone] and Fax [phone]    TRANSTHORACIC ECHOCARDIOGRAM REPORT       Patient Name:      JG STARR     Reading Physician:    [id number] Darren Chance MD  Study Date:        12/11/2023           Ordering Provider:    [id number] CRISTAL PRATER  MRN/PID:           [id number]             Fellow:  Accession#:        [id number]         Nurse:                Crys Hernandez RN  Date of Birth/Age: 1960 / 63 years Sonographer:          Olivia Morrissey RDCS  Gender:            M                    Additional Staff:  Height:            187.96 cm            Admit Date:           12/8/2023  Weight:            125.19 kg            Admission Status:     Inpatient -                                                                Routine  BSA:               2.49 m2              Encounter#:            [id number]                                          Department Location:  [address]  Blood Pressure: 114 /62 mmHg    Study Type:    TRANSTHORACIC ECHO (TTE) COMPLETE  Diagnosis/ICD: Cerebral Infarction, unspecified-I63.9  Indication:    Stroke  CPT Code:      Echo Complete w Full Doppler-18576    Patient History:  Pertinent History: HTN, CAD, CVA and ISCH. CMO, DM.    Study Detail: The following Echo studies were performed: 2D, Doppler, M-Mode and                color flow. Technically challenging study due to body habitus,                patient lying in supine position and COVID protocol. Definity used                as a contrast agent for endocardial border definition and agitated                saline used as a contrast agent for intraseptal flow evaluation.                Total contrast used for this procedure was 2.0 mL via IV push.                Unable to obtain subcostal, suprasternal notch and 4-ch LA view.                The patient was awake.       PHYSICIAN INTERPRETATION:  Left Ventricle: The left ventricular systolic function is normal, with an estimated ejection fraction of 40-45%. There are no regional wall motion abnormalities. The left ventricular cavity size is normal. Spectral Doppler shows a pseudonormal pattern of left ventricular diastolic filling.  LV Wall Scoring:  The apical septal segment and apex are hypokinetic.    Left Atrium: The left atrium is normal in size. There is no evidence of a patent foramen ovale.  Right Ventricle: The right ventricle is normal in size. There is normal right ventricular global systolic function.  Right Atrium: The right atrium is normal in size.  Aortic Valve: The aortic valve was not well visualized. There is mild aortic valve regurgitation. The peak instantaneous gradient of the aortic valve is 4.2 mmHg. The mean gradient of the aortic valve is 2.0 mmHg.  Mitral Valve: The mitral valve is normal in structure. There is trace to mild mitral valve  regurgitation.  Tricuspid Valve: The tricuspid valve is structurally normal. There is trace to mild tricuspid regurgitation.  Pulmonic Valve: The pulmonic valve is not well visualized. The pulmonic valve regurgitation was not well visualized.  Pericardium: There is no pericardial effusion noted.  Aorta: The aortic root is normal.  Pulmonary Artery: The tricuspid regurgitant velocity is 1.35 m/s, and with an estimated right atrial pressure of 10 mmHg, the estimated pulmonary artery pressure is normal with the RVSP at 17.3 mmHg.  Systemic Veins: The inferior vena cava appears to be of normal size.       CONCLUSIONS:   1. Left ventricular systolic function is normal with a 40-45% estimated ejection fraction.   2. Apical septal segment and apex are abnormal.   3. Spectral Doppler shows a pseudonormal pattern of left ventricular diastolic filling.   4. Mild aortic valve regurgitation.   5. There is no evidence of a patent foramen ovale.    QUANTITATIVE DATA SUMMARY:  2D MEASUREMENTS:                            Normal Ranges:  IVSd:          1.35 cm    (0.6-1.1cm)  LVPWd:         1.19 cm    (0.6-1.1cm)  LVIDd:         5.35 cm    (3.9-5.9cm)  LVIDs:         3.98 cm  LV Mass Index: 113.1 g/m2  LV % FS        25.6 %    LA VOLUME:                                Normal Ranges:  LA Vol A2C:        42.7 ml  LA Vol Index A2C:  17.1 ml/m2  LA Area A2C:       16.0 cm2  LA Major Axis A2C: 5.1 cm  LA Vol A2C:        40.0 ml    LV SYSTOLIC FUNCTION BY 2D PLANIMETRY (MOD):                      Normal Ranges:  EF-A4C View: 45.5 % (>=55%)  EF-A2C View: 41.9 %  EF-Biplane:  42.0 %    LV DIASTOLIC FUNCTION:                         Normal Ranges:  MV Peak E:    0.66 m/s (0.7-1.2 m/s)  MV Peak A:    0.52 m/s (0.42-0.7 m/s)  E/A Ratio:    1.27     (1.0-2.2)  MV e'         0.06 m/s (>8.0)  MV lateral e' 0.05 m/s  MV medial e'  0.06 m/s  E/e' Ratio:   10.93    (<8.0)    MITRAL VALVE:                  Normal Ranges:  MV DT: 222 msec  (150-240msec)    AORTIC VALVE:                                    Normal Ranges:  AoV Vmax:                1.02 m/s (<=1.7m/s)  AoV Peak P.2 mmHg (<20mmHg)  AoV Mean P.0 mmHg (1.7-11.5mmHg)  LVOT Max Anders:            0.90 m/s (<=1.1m/s)  AoV VTI:                 20.70 cm (18-25cm)  LVOT VTI:                17.50 cm  LVOT Diameter:           2.40 cm  (1.8-2.4cm)  AoV Area, VTI:           3.82 cm2 (2.5-5.5cm2)  AoV Area,Vmax:           3.97 cm2 (2.5-4.5cm2)  AoV Dimensionless Index: 0.85    TRICUSPID VALVE/RVSP:                              Normal Ranges:  Peak TR Velocity: 1.35 m/s  RV Syst Pressure: 17.3 mmHg (< 30mmHg)    PULMONIC VALVE:                       Normal Ranges:  PV Max Anders: 0.6 m/s  (0.6-0.9m/s)  PV Max P.2 mmHg       [id number] Darren Chance MD  Electronically signed on 2023 at 5:12:53 PM       Wall Scoring       ** Final **      Current Facility-Administered Medications:     acetaminophen (Tylenol) tablet 650 mg, 650 mg, oral, q4h PRN, Bi Elliott MD    aspirin EC tablet 81 mg, 81 mg, oral, Daily, Bi Elliott MD, 81 mg at 23    aspirin suppository 300 mg, 300 mg, rectal, Once, Bi Elliott MD    atorvastatin (Lipitor) tablet 80 mg, 80 mg, oral, Nightly, Bi Elliott MD, 80 mg at 23    dexAMETHasone (Decadron) tablet 6 mg, 6 mg, oral, Daily, 6 mg at 23 **OR** dexAMETHasone oral liquid 6 mg, 6 mg, oral, Daily **OR** dexAMETHasone (PF) (Decadron) injection 6 mg, 6 mg, intravenous, Daily, iB Elliott MD, 6 mg at 23    enoxaparin (Lovenox) syringe 40 mg, 40 mg, subcutaneous, q24h, Bi Elliott MD, 40 mg at 23 0918    guaiFENesin (Robitussin) 100 mg/5 mL syrup 200 mg, 200 mg, oral, q4h PRN, Bi Elliott MD    [] hydrALAZINE (Apresoline) injection 10 mg, 10 mg, intravenous, q20 min PRN **FOLLOWED BY** hydrALAZINE (Apresoline) tablet 25 mg, 25 mg, oral, q6h PRN,  Bi Elliott MD    levETIRAcetam (Keppra) tablet 500 mg, 500 mg, oral, BID, Stas Garcia MD, 500 mg at 12/13/23 0918    lisinopril tablet 40 mg, 40 mg, oral, Daily, Bi Elliott MD, 40 mg at 12/13/23 0917    metoprolol succinate XL (Toprol-XL) 24 hr tablet 50 mg, 50 mg, oral, Daily, Bi Elliott MD, 50 mg at 12/13/23 0917    oxygen (O2) therapy, , inhalation, Continuous PRN - O2/gases, Alba Yarbrough,     polyethylene glycol (Glycolax, Miralax) packet 17 g, 17 g, oral, Daily, Bi Elliott MD, 17 g at 12/11/23 0856    polyethylene glycol (Glycolax, Miralax) packet 17 g, 17 g, oral, Daily PRN, Bi Elliott MD    [COMPLETED] remdesivir (Veklury) 200 mg in sodium chloride 0.9% 250 mL IV, 200 mg, intravenous, Once, 200 mg at 12/09/23 1518 **FOLLOWED BY** remdesivir (Veklury) 100 mg in sodium chloride 0.9% 250 mL IV, 100 mg, intravenous, q24h, Nazia Sibley MD, 100 mg at 12/12/23 1103   Physical Exam  Constitutional:       Appearance: Normal appearance.   HENT:      Head: Normocephalic and atraumatic.      Nose: Nose normal.      Mouth/Throat:      Mouth: Mucous membranes are moist.   Eyes:      Extraocular Movements: Extraocular movements intact.      Pupils: Pupils are equal, round, and reactive to light.   Cardiovascular:      Rate and Rhythm: Normal rate.      Pulses: Normal pulses.   Pulmonary:      Effort: Pulmonary effort is normal.      Breath sounds: Normal breath sounds.   Abdominal:      General: Bowel sounds are normal.      Palpations: Abdomen is soft.   Musculoskeletal:         General: Normal range of motion.      Cervical back: Neck supple.      Comments: No edema. No distal hair growth.    Skin:     General: Skin is warm and dry.   Neurological:      Mental Status: He is alert.      Comments: Today, he is more alert. He is able to get an occasional complete sentence out. Able to follow commands today without difficulty and answering most questions fairly  appropriately. No new focal deficits   Psychiatric:         Mood and Affect: Mood normal.         Behavior: Behavior normal.         Relevant Results             Assessment/Plan          Principal Problem:    Altered mental status, unspecified altered mental status type    COVID. Inflammatory markers are normal. Today is last day of remdesevir. Continue decadron for 10 days. Still is on oxygen, 2 liters, which hopefully will be able to be weaned when moving more  Seizure disorder new onset. Did ask neuro to re-eval. Keppra level is pending, suspect it may be low for him, good sized man. Yesterday, much worse with his confusion and aphasia-better today,, may have had non witnessed small seizure and been post ictal.   Hx of CVA  CAD/ICM, EF 40-45%. No evidence of fluid overload at this time  DM, mild. Continue to monitor.  Aneurysm, known, stable  Discharge planning in progress-? If mentation back to baseline or close could go home with ? Will need to monitor mentation closely              Lorie Lin MD

## 2023-12-13 NOTE — PROGRESS NOTES
Speech-Language Pathology    SLP ADULT Inpatient Speech-Language Cognition    Patient Name: Jg Starr  MRN: [id number]  Today's Date: 12/13/2023   Time Calculation  Start Time: 1028  Stop Time: 1038  Time Calculation (min): 10 min         Current Problem:   1. Altered mental status, unspecified altered mental status type        2. Seizure-like activity (CMS/HCC)        3. Saccular aneurysm        4. COVID-19        5. Hypertensive emergency        6. Cerebrovascular accident (CVA), unspecified mechanism (CMS/HCC)  Transthoracic Echo (TTE) Complete    Transthoracic Echo (TTE) Complete            SLP Assessment:  SLP Assessment  SLP Assessment Results: Receptive Comprehension deficits, Executive function deficits, Expression deficits  Prognosis: good  Treatment Provided: yes  Treatment Tolerance: Pleasant for about 10 minutes, then commented re: desire to rest.     SLP Plan:  Plan  Inpatient/Swing Bed or Outpatient: Inpatient  Treatment/Interventions: Communication functioning, Executive functioning, Expressive Language, Receptive Language  SLP TX Plan: Continue Plan of Care  SLP Plan: Skilled SLP  SLP Frequency: 3x per week  Duration: Current admission  Discussed POC: Patient, Nursing  Discussed Risks/Benefits: Yes  Patient/Caregiver Agreeable: Yes      Subjective   Awake,  pleasant.  General Visit Information:  General Information  Caregiver Feedback: Per nursing, pt functioning is not at baseline.  Reason for Referral: aphasia and cognitiveimpairment  Referred By: Dr. Lin      Objective       Pain:  Pain Assessment  Pain Assessment: 0-10  Pain Score: 0 - No pain      Cognition:  Cognition  Overall Cognitive Status: Impaired  Arousal/Alertness: Appropriate responses to stimuli  Orientation Level: Unable to assess due to aphasia.   Motor Speech Production:  Motor Speech Production  Oral Motor : WFL  Automatic Speech: With mod to max assist, the patient was able to complete automatic sentence completion tasks  "with 50% success. Conversational comments were appropriate 60% of the time. Other times he commented on items unrelated. (For example: examiner: \"Where are you from?\" Patient: \"Yea, I have my socks on\".  Repetition: Impaired- he was unable to repeat words and phrases despite cues. Question of comprehension of the task.   Intelligibility: WFL  Diadochokinetic Rate: WFL  Paralinguistic Features: WFL  Respiratory Support: WFL  Auditory Comprehension:   Auditory Comprehension  Yes/No Questions: Impaired- 60 % with max assist.   Basic Questions: appropriate responses on 50% of conversational questions.  Commands: Impaired  One Step Basic Commands: Impaired  Verbal Expression  Primary Mode of Expression: Verbal  Primary Language: English  Confrontation Naming: Impaired- 40% today with pictures. 50% with naming objects in room.   Responsive Naming: Impaired- unable to demonstrate comprehension of this task.  Affect: Within Functional Limits  Eye Contact: Within Functional Limits  Topic Initiation: Unable to assess    Assessment:  Improvement in automatic utterances, but significant functional communication impairment is evident.     SLP Plan:  Plan  Treatment/Interventions: Communication functioning, Executive functioning, Expressive Language, Receptive Language  SLP Plan: Skilled SLP  SLP Frequency: 3x per week  Duration: Current admission  Discussed POC: Patient, Nursing  Discussed Risks/Benefits: Yes  Patient/Caregiver Agreeable: Yes     Goals:  Pt will participate in assessment of functional communication with min assist.   Pt will respond to simple commands with min assist and 90% accuracy.  Pt will verbalize appropriately to questions with min assist 90% to improve functional communication of wants and needs.       "
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
## 2023-12-13 NOTE — PROGRESS NOTES
12/13/23 0952   Discharge Planning   Living Arrangements Children   Support Systems Children   Assistance Needed Patient has a history of CVA with expressive aphasia but is A&O x3 baseline. Per son, patient uses no DME at baseline and is independent with ADLs. Patient is on room air   Type of Residence Private residence   Who is requesting discharge planning? Provider   Home or Post Acute Services Other (Comment)  (TBD)   Patient expects to be discharged to: TBD- SW assisting with dc planning. Possible need for guardian per provider. Requiring O2, no O2 baseline. Therapy recomended low intensity therapy at dc, SLP recommends SLP 3X week. Confused vs expressive aphasia currently.   Does the patient need discharge transport arranged? Yes   RoundTrip coordination needed? Yes   Has discharge transport been arranged? No        12/15/23 0958   Discharge Planning   Living Arrangements Children   Support Systems Children   Assistance Needed Patient has a history of CVA with expressive aphasia but is A&O x3 baseline. Per son, patient uses no DME at baseline and is independent with ADLs. Patient is on room air   Type of Residence Private residence   Who is requesting discharge planning? Provider   Home or Post Acute Services Post acute facilities (Rehab/SNF/etc)   Patient expects to be discharged to: TBD- SW assisting with dc planning/placement/guardianship, room air   Does the patient need discharge transport arranged? Yes   RoundTrip coordination needed? Yes   Has discharge transport been arranged? No   Patient Choice   Provider Choice list and CMS website (https://medicare.gov/care-compare#search) for post-acute Quality and Resource Measure Data were provided and reviewed with: Patient;Family        12/18/23 1039   Discharge Planning   Living Arrangements Children   Support Systems Children   Assistance Needed Patient has a history of CVA with expressive aphasia but is A&O x3 baseline. Per son, patient uses no DME at  baseline and is independent with ADLs. Patient is on room air   Type of Residence Private residence   Do you have animals or pets at home? No   Who is requesting discharge planning? Provider   Home or Post Acute Services Post acute facilities (Rehab/SNF/etc)   Type of Post Acute Facility Services Long term care   Patient expects to be discharged to: [facility] LTC- pending long term auth(initiated 12/18)   Does the patient need discharge transport arranged? Yes   RoundTrip coordination needed? Yes   Has discharge transport been arranged? No   Patient Choice   Provider Choice list and CMS website (https://medicare.gov/care-compare#search) for post-acute Quality and Resource Measure Data were provided and reviewed with: Patient;Family     12-18-23 @ 1042: MD completed orders need for PASRR. [facility]IDA notified to start PASRR process for new LTC auth. [facility]  initiating LT auth process.    12-18- 23 @ 1510: Transport for 1400 cancelled by other transport service. Requested new transport via roundtrip and new transport time of 1800 confirmed. Nurse aware.    12/19/23 @ 0914: Transport also cancelled yesterday at 1800, new transport confirmed for 1000 today. Notified son of transport and nurse provided report number.

## 2023-12-13 NOTE — PROGRESS NOTES
Jg Starr is a 63 y.o. male on day 5 of admission presenting with Altered mental status, unspecified altered mental status type.    Subjective   Interval History: no fever, no new complaints, the hx is not reliable         Review of Systems    Objective   Range of Vitals (last 24 hours)  Heart Rate:  [61-76]   Temp:  [36 °C (96.8 °F)-36.7 °C (98.1 °F)]   Resp:  [16-17]   BP: (117-142)/(74-85)   SpO2:  [94 %-98 %]   Daily Weight  12/08/23 : 126 kg (276 lb 14.4 oz)    Body mass index is 35.55 kg/m².    Physical Exam  Constitutional:       Appearance: Normal appearance.   HENT:      Head: Normocephalic and atraumatic.      Mouth/Throat:      Mouth: Mucous membranes are moist.      Pharynx: Oropharynx is clear.   Eyes:      Pupils: Pupils are equal, round, and reactive to light.   Cardiovascular:      Rate and Rhythm: Normal rate and regular rhythm.      Heart sounds: Normal heart sounds.   Pulmonary:      Effort: Pulmonary effort is normal.      Breath sounds: Normal breath sounds.   Abdominal:      General: Abdomen is flat. Bowel sounds are normal.      Palpations: Abdomen is soft.   Musculoskeletal:      Cervical back: Normal range of motion.   Neurological:      Mental Status: He is alert.         Antibiotics  LORazepam (Ativan) injection  - Omnicell Override Pull  LORazepam (Ativan) injection 2 mg  levETIRAcetam in NaCl (iso-os) (Keppra) IV 1,000 mg  iohexol (OMNIPaque) 350 mg iodine/mL solution 85 mL  labetaloL (Normodyne,Trandate) injection 5 mg  labetaloL (Normodyne,Trandate) injection 5 mg  labetaloL (Normodyne,Trandate) injection 10 mg  aspirin chewable tablet 81 mg  atorvastatin (Lipitor) tablet 80 mg  lisinopril tablet 40 mg  metoprolol succinate XL (Toprol-XL) 24 hr tablet 50 mg  oxygen (O2) therapy  perflutren lipid microspheres (Definity) injection 3 mL of dilution  labetaloL (Normodyne,Trandate) injection 10 mg  hydrALAZINE (Apresoline) injection 10 mg  hydrALAZINE (Apresoline) tablet 25  mg  polyethylene glycol (Glycolax, Miralax) packet 17 g  enoxaparin (Lovenox) syringe 40 mg  atorvastatin (Lipitor) tablet 80 mg  aspirin EC tablet 81 mg  acetaminophen (Tylenol) tablet 650 mg  dexAMETHasone (Decadron) tablet 6 mg  dexAMETHasone oral liquid 6 mg  dexAMETHasone (PF) (Decadron) injection 6 mg  polyethylene glycol (Glycolax, Miralax) packet 17 g  guaiFENesin (Robitussin) 100 mg/5 mL syrup 200 mg  aspirin suppository 300 mg  oxygen (O2) therapy  azithromycin (Zithromax) in dextrose 5 % in water (D5W) 250 mL  mg  cefTRIAXone (Rocephin) 2 g IV in dextrose 5% 50 mL  remdesivir (Veklury) 200 mg in sodium chloride 0.9% 250 mL IV  remdesivir (Veklury) 100 mg in sodium chloride 0.9% 250 mL IV  LORazepam (Ativan) injection 2 mg      Relevant Results  Labs  Results from last 72 hours   Lab Units 12/12/23  0616 12/11/23  0544   WBC AUTO x10*3/uL 10.4 10.1   HEMOGLOBIN g/dL 15.0 13.6   HEMATOCRIT % 44.5 42.9   PLATELETS AUTO x10*3/uL 339 295       Results from last 72 hours   Lab Units 12/13/23  0556 12/12/23  0616 12/11/23  0544   SODIUM mmol/L 138 139 138   POTASSIUM mmol/L 4.0 4.0 4.0   CHLORIDE mmol/L 104 103 100   CO2 mmol/L 26 27 29   BUN mg/dL 31* 30* 30*   CREATININE mg/dL 1.00 0.96 1.03   GLUCOSE mg/dL 112* 107* 113*   CALCIUM mg/dL 8.2* 8.2* 8.4*   ANION GAP mmol/L 12 13 13   EGFR mL/min/1.73m*2 86 90 83       Results from last 72 hours   Lab Units 12/12/23  0616 12/11/23  0544   ALK PHOS U/L 44 45   BILIRUBIN TOTAL mg/dL 0.6 0.6   BILIRUBIN DIRECT mg/dL 0.1  --    PROTEIN TOTAL g/dL 6.5 6.8   ALT U/L 16 14   AST U/L 12 12   ALBUMIN g/dL 3.6 3.7       Estimated Creatinine Clearance: 109.4 mL/min (by C-G formula based on SCr of 1 mg/dL).  C-Reactive Protein   Date Value Ref Range Status   12/12/2023 0.22 <1.00 mg/dL Final   12/09/2023 2.89 (H) <1.00 mg/dL Final     Microbiology  Reviewed  Imaging  reviewed        Assessment/Plan   Respiratory failure / pneumonia / COVID19 infection, procalcitonin  0.13  Encephalopathy, likely metabolic  Bacteremia, CNS / psychrobacter, likely a contaminant      Recommendations :  Remdesivir protocol  Chest PT  Wean O2  Discussed with the medical team     I spent minutes in the professional and overall care of this patient.      Nazia Sibley MD

## 2023-12-13 NOTE — PROGRESS NOTES
Physical Therapy    Physical Therapy Treatment    Patient Name: Jg Starr  MRN: [id number]  Today's Date: 12/13/2023  Time Calculation  Start Time: 1438  Stop Time: 1504  Time Calculation (min): 26 min       Assessment/Plan   PT Assessment  PT Assessment Results: Decreased endurance, Decreased mobility, Decreased cognition (impulsivity)  Rehab Prognosis: Good  Evaluation/Treatment Tolerance: Patient tolerated treatment well  Medical Staff Made Aware: Yes  End of Session Communication: Bedside nurse  End of Session Patient Position: Bed, 3 rail up, Alarm on  PT Plan  Inpatient/Swing Bed or Outpatient: Inpatient  PT Plan  Treatment/Interventions: Bed mobility, Transfer training, Gait training, Strengthening, Therapeutic exercise  PT Plan: Skilled PT  PT Frequency: 3 times per week  PT Discharge Recommendations: Low intensity level of continued care  PT - OK to Discharge: Yes (progress towards goals)      General Visit Information:   PT  Visit  PT Received On: 12/13/23  Response to Previous Treatment: Patient with no complaints from previous session.  General  Reason for Referral:  (62 yo male admitted 2' to altered mental status, confusion and covid)  Prior to Session Communication: Bedside nurse  Patient Position Received: Bed, 3 rail up, Alarm off, not on at start of session  General Comment:  (Pt supine, confused but agreeable to work with PT. Pt exhibiting impulsivity. Pt moved well and performed his sesion tasks well.)    Subjective   Precautions:     Vital Signs:       Objective   Pain:  Pain Assessment  Pain Assessment: 0-10  Pain Score: 0 - No pain  Cognition:  Cognition  Overall Cognitive Status: Impaired at baseline  Postural Control:     Extremity/Trunk Assessments:  RUE   RUE : Within Functional Limits  LUE   LUE: Within Functional Limits  RLE   RLE : Within Functional Limits  LLE   LLE : Within Functional Limits  Activity Tolerance:  Activity Tolerance  Endurance: Tolerates 10 - 20 min exercise with  multiple rests  Treatments:       Bed Mobility  Bed Mobility: Yes  Bed Mobility 1  Bed Mobility 1: Supine to sitting, Sitting to supine  Level of Assistance 1: Distant supervision    Ambulation/Gait Training  Ambulation/Gait Training Performed: Yes  Ambulation/Gait Training 1  Surface 1: Level tile  Device 1: No device  Assistance 1: Close supervision  Quality of Gait 1: Narrow base of support  Comments/Distance (ft) 1:  (40 feet)  Transfers  Transfer: Yes  Transfer 1  Transfer From 1: Bed to  Transfer to 1: Stand  Technique 1: Sit to stand, Stand to sit  Transfer Device 1:  (no device)  Transfer Level of Assistance 1: Close supervision    Outcome Measures:  [facility] Basic Mobility  Turning from your back to your side while in a flat bed without using bedrails: None  Moving from lying on your back to sitting on the side of a flat bed without using bedrails: None  Moving to and from bed to chair (including a wheelchair): A little  Standing up from a chair using your arms (e.g. wheelchair or bedside chair): A little  To walk in hospital room: A little  Climbing 3-5 steps with railing: A little  Basic Mobility - Total Score: 20    Education Documentation  No documentation found.  Education Comments  No comments found.        OP EDUCATION:       Encounter Problems       Encounter Problems (Active)       Mobility       STG - Patient will ambulate 150 feet independently with no device (Progressing)       Start:  12/11/23    Expected End:  12/25/23               Pain - Adult          Transfers       STG - Patient to transfer to and from sit to supine independently (Progressing)       Start:  12/11/23    Expected End:  12/25/23            STG - Patient will transfer sit to and from stand independently with no device (Progressing)       Start:  12/11/23    Expected End:  12/25/23

## 2023-12-14 PROCEDURE — 2500000001 HC RX 250 WO HCPCS SELF ADMINISTERED DRUGS (ALT 637 FOR MEDICARE OP): Performed by: INTERNAL MEDICINE

## 2023-12-14 PROCEDURE — 99233 SBSQ HOSP IP/OBS HIGH 50: CPT

## 2023-12-14 PROCEDURE — 97535 SELF CARE MNGMENT TRAINING: CPT | Mod: GO,CO

## 2023-12-14 PROCEDURE — 2500000004 HC RX 250 GENERAL PHARMACY W/ HCPCS (ALT 636 FOR OP/ED): Performed by: STUDENT IN AN ORGANIZED HEALTH CARE EDUCATION/TRAINING PROGRAM

## 2023-12-14 PROCEDURE — 2060000001 HC INTERMEDIATE ICU ROOM DAILY

## 2023-12-14 PROCEDURE — 96372 THER/PROPH/DIAG INJ SC/IM: CPT | Performed by: INTERNAL MEDICINE

## 2023-12-14 PROCEDURE — 2500000005 HC RX 250 GENERAL PHARMACY W/O HCPCS: Performed by: FAMILY MEDICINE

## 2023-12-14 PROCEDURE — 99231 SBSQ HOSP IP/OBS SF/LOW 25: CPT

## 2023-12-14 PROCEDURE — 2500000004 HC RX 250 GENERAL PHARMACY W/ HCPCS (ALT 636 FOR OP/ED): Performed by: INTERNAL MEDICINE

## 2023-12-14 RX ORDER — FUROSEMIDE 10 MG/ML
20 INJECTION INTRAMUSCULAR; INTRAVENOUS ONCE
Status: COMPLETED | OUTPATIENT
Start: 2023-12-14 | End: 2023-12-14

## 2023-12-14 RX ADMIN — METOPROLOL SUCCINATE 50 MG: 50 TABLET, EXTENDED RELEASE ORAL at 09:43

## 2023-12-14 RX ADMIN — LISINOPRIL 40 MG: 20 TABLET ORAL at 09:43

## 2023-12-14 RX ADMIN — FUROSEMIDE 20 MG: 10 INJECTION, SOLUTION INTRAMUSCULAR; INTRAVENOUS at 13:57

## 2023-12-14 RX ADMIN — LEVETIRACETAM 750 MG: 500 TABLET, FILM COATED ORAL at 21:23

## 2023-12-14 RX ADMIN — ASPIRIN 81 MG: 81 TABLET, COATED ORAL at 09:43

## 2023-12-14 RX ADMIN — ENOXAPARIN SODIUM 40 MG: 40 INJECTION SUBCUTANEOUS at 09:43

## 2023-12-14 RX ADMIN — Medication 3 L/MIN: at 08:42

## 2023-12-14 RX ADMIN — LEVETIRACETAM 750 MG: 500 TABLET, FILM COATED ORAL at 09:43

## 2023-12-14 RX ADMIN — ATORVASTATIN CALCIUM 80 MG: 80 TABLET, FILM COATED ORAL at 21:23

## 2023-12-14 RX ADMIN — DEXAMETHASONE 6 MG: 6 TABLET ORAL at 09:43

## 2023-12-14 ASSESSMENT — ACTIVITIES OF DAILY LIVING (ADL): HOME_MANAGEMENT_TIME_ENTRY: 15

## 2023-12-14 ASSESSMENT — PAIN SCALES - GENERAL
PAINLEVEL_OUTOF10: 0 - NO PAIN

## 2023-12-14 ASSESSMENT — COGNITIVE AND FUNCTIONAL STATUS - GENERAL
DAILY ACTIVITIY SCORE: 23
DAILY ACTIVITIY SCORE: 24
DRESSING REGULAR LOWER BODY CLOTHING: A LITTLE
CLIMB 3 TO 5 STEPS WITH RAILING: A LITTLE
MOBILITY SCORE: 24
MOBILITY SCORE: 23
DAILY ACTIVITIY SCORE: 24

## 2023-12-14 ASSESSMENT — VISUAL ACUITY: VA_NORMAL: 1

## 2023-12-14 ASSESSMENT — PAIN - FUNCTIONAL ASSESSMENT
PAIN_FUNCTIONAL_ASSESSMENT: 0-10
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
## 2023-12-14 NOTE — PROGRESS NOTES
Jg Starr is a 63 y.o. male on day 6 of admission presenting with Altered mental status, unspecified altered mental status type.    Subjective   Interval History: no fever, no new complaints, the hx is not reliable         Review of Systems    Objective   Range of Vitals (last 24 hours)  Heart Rate:  [54-70]   Temp:  [36 °C (96.8 °F)-36.5 °C (97.7 °F)]   Resp:  [16-18]   BP: (125-158)/(75-95)   SpO2:  [94 %-98 %]   Daily Weight  12/08/23 : 126 kg (276 lb 14.4 oz)    Body mass index is 35.55 kg/m².    Physical Exam  Constitutional:       Appearance: Normal appearance.   HENT:      Head: Normocephalic and atraumatic.      Mouth/Throat:      Mouth: Mucous membranes are moist.      Pharynx: Oropharynx is clear.   Eyes:      Pupils: Pupils are equal, round, and reactive to light.   Cardiovascular:      Rate and Rhythm: Normal rate and regular rhythm.      Heart sounds: Normal heart sounds.   Pulmonary:      Effort: Pulmonary effort is normal.      Breath sounds: Normal breath sounds.   Abdominal:      General: Abdomen is flat. Bowel sounds are normal.      Palpations: Abdomen is soft.   Musculoskeletal:      Cervical back: Normal range of motion.   Neurological:      Mental Status: He is alert.         Antibiotics  LORazepam (Ativan) injection  - Omnicell Override Pull  LORazepam (Ativan) injection 2 mg  levETIRAcetam in NaCl (iso-os) (Keppra) IV 1,000 mg  iohexol (OMNIPaque) 350 mg iodine/mL solution 85 mL  labetaloL (Normodyne,Trandate) injection 5 mg  labetaloL (Normodyne,Trandate) injection 5 mg  labetaloL (Normodyne,Trandate) injection 10 mg  aspirin chewable tablet 81 mg  atorvastatin (Lipitor) tablet 80 mg  lisinopril tablet 40 mg  metoprolol succinate XL (Toprol-XL) 24 hr tablet 50 mg  oxygen (O2) therapy  perflutren lipid microspheres (Definity) injection 3 mL of dilution  labetaloL (Normodyne,Trandate) injection 10 mg  hydrALAZINE (Apresoline) injection 10 mg  hydrALAZINE (Apresoline) tablet 25  mg  polyethylene glycol (Glycolax, Miralax) packet 17 g  enoxaparin (Lovenox) syringe 40 mg  atorvastatin (Lipitor) tablet 80 mg  aspirin EC tablet 81 mg  acetaminophen (Tylenol) tablet 650 mg  dexAMETHasone (Decadron) tablet 6 mg  dexAMETHasone oral liquid 6 mg  dexAMETHasone (PF) (Decadron) injection 6 mg  polyethylene glycol (Glycolax, Miralax) packet 17 g  guaiFENesin (Robitussin) 100 mg/5 mL syrup 200 mg  aspirin suppository 300 mg  oxygen (O2) therapy  azithromycin (Zithromax) in dextrose 5 % in water (D5W) 250 mL  mg  cefTRIAXone (Rocephin) 2 g IV in dextrose 5% 50 mL  remdesivir (Veklury) 200 mg in sodium chloride 0.9% 250 mL IV  remdesivir (Veklury) 100 mg in sodium chloride 0.9% 250 mL IV  LORazepam (Ativan) injection 2 mg      Relevant Results  Labs  Results from last 72 hours   Lab Units 12/12/23  0616   WBC AUTO x10*3/uL 10.4   HEMOGLOBIN g/dL 15.0   HEMATOCRIT % 44.5   PLATELETS AUTO x10*3/uL 339       Results from last 72 hours   Lab Units 12/13/23  0556 12/12/23  0616   SODIUM mmol/L 138 139   POTASSIUM mmol/L 4.0 4.0   CHLORIDE mmol/L 104 103   CO2 mmol/L 26 27   BUN mg/dL 31* 30*   CREATININE mg/dL 1.00 0.96   GLUCOSE mg/dL 112* 107*   CALCIUM mg/dL 8.2* 8.2*   ANION GAP mmol/L 12 13   EGFR mL/min/1.73m*2 86 90       Results from last 72 hours   Lab Units 12/12/23  0616   ALK PHOS U/L 44   BILIRUBIN TOTAL mg/dL 0.6   BILIRUBIN DIRECT mg/dL 0.1   PROTEIN TOTAL g/dL 6.5   ALT U/L 16   AST U/L 12   ALBUMIN g/dL 3.6       Estimated Creatinine Clearance: 109.4 mL/min (by C-G formula based on SCr of 1 mg/dL).  C-Reactive Protein   Date Value Ref Range Status   12/12/2023 0.22 <1.00 mg/dL Final   12/09/2023 2.89 (H) <1.00 mg/dL Final     Microbiology  Reviewed  Imaging  reviewed        Assessment/Plan   Respiratory failure / pneumonia / COVID19 infection, procalcitonin 0.13, completed Remdesivir   Encephalopathy, likely metabolic  Bacteremia, CNS / psychrobacter, likely a contaminant       Recommendations :  Supportive care  Wean O2  Discussed with the medical team     I spent minutes in the professional and overall care of this patient.      Nazia Sibley MD

## 2023-12-14 NOTE — CARE PLAN
12/14/23:  SW spoke obtained completed guardianship paperwork for pt.  SW spoke to son, who is willing to be pt's guardian.  SW took all paperwork to Probate Court to begin filing process.  Per son, he can not take pt home at this time due to severe aphasia.  Son is in agreement for an acute/neuro placement if one can be arranged.  SW discussed [facility]; son prefers a closer facility.  SW will follow.  12/15/23:  SW spoke to [facility] and sent referral for review.  SW will follow.  TCC updated.  1400:  Delay in response from [facility], referral sent to  Acute Rehab and pre-cert initiated.  SW will follow.  SW also rec'd confirmation that hearing for emergency guardianship will take place today.      12/15/23:  RITO spoke to pt's son and updated that [facility] has denied pt and  Acute has started pre-cert, SW also left a message at [facility].  Son was granted emergency guardianship yesterday and provided documents to RN who placed a copy on the chart.  SW discussed with son that insurance may not approve Rehab; son agreeable to placement in [facility] if needed; son is open to appropriate placement in [place] [facility].  SW requested [facility]NC send referral to [facility] and [facility] for review.  RITO updated TCC.  1500;  RITO spoke to son and requested that pre-cert be started for [facility].  RITO updated CNC.    12/18/23 1345:  RITO updated that auth has been rec'd and pt can admit today.  CNC arranged transport; RITO spoke to son and updated on dispo plan.  Son in agreement; has a hearing for guardianship tomorrow.  RITO updated ICF.

## 2023-12-14 NOTE — PROGRESS NOTES
Subjective   Pt lying in bed under COVID precautions. Demonstrating receptive aphasia causing difficulty communications and following instructions/commands but otherwise grossly intact and appropriate.      Objective   Neurological Exam  Mental Status  Awake, alert and oriented to person, place and time. Oriented to person, place, time and situation. Recent and remote memory are intact. Speech is normal. Receptive aphasia present. Attention and concentration are normal. Fund of knowledge is appropriate for level of education.  Difficulty following commands which appears to more so be related to his receptive aphasia. .    Cranial Nerves  CN II: Visual acuity is normal. Visual fields full to confrontation.  CN III, IV, VI: Extraocular movements intact bilaterally. Normal lids and orbits bilaterally. Pupils equal round and reactive to light bilaterally.  CN V: Facial sensation is normal.  CN VII: Full and symmetric facial movement.  CN VIII: Hearing is normal.  CN IX, X: Palate elevates symmetrically. Normal gag reflex.  CN XI: Shoulder shrug strength is normal.  CN XII: Tongue midline without atrophy or fasciculations.    Motor  Normal muscle bulk throughout. No fasciculations present. Normal muscle tone. No abnormal involuntary movements.  Moving all extremities spontaneously but formal strength testing limited 2/2 mental status.  .    Sensory  Light touch is normal in upper and lower extremities. Pinprick is normal in upper and lower extremities. Temperature is normal in upper and lower extremities. Vibration is normal in upper and lower extremities. Proprioception is normal in upper and lower extremities.     Reflexes  Right Plantar: downgoing  Left Plantar: downgoing    Coordination  Right: Finger-to-nose normal. Rapid alternating movement normal. Heel-to-shin normal.Left: Finger-to-nose normal. Rapid alternating movement normal. Heel-to-shin normal.    Gait    Deferred for pt safety .          Last Recorded  "Vitals  Blood pressure 127/75, pulse 68, temperature 36.5 °C (97.7 °F), resp. rate 18, height 1.88 m (6' 2\"), weight 126 kg (276 lb 14.4 oz), SpO2 94 %.         Current Facility-Administered Medications:     acetaminophen (Tylenol) tablet 650 mg, 650 mg, oral, q4h PRN, Bi Elliott MD    aspirin EC tablet 81 mg, 81 mg, oral, Daily, Bi Elliott MD, 81 mg at 23    aspirin suppository 300 mg, 300 mg, rectal, Once, Bi Elliott MD    atorvastatin (Lipitor) tablet 80 mg, 80 mg, oral, Nightly, Bi Elliott MD, 80 mg at 23    dexAMETHasone (Decadron) tablet 6 mg, 6 mg, oral, Daily, 6 mg at 23 **OR** dexAMETHasone oral liquid 6 mg, 6 mg, oral, Daily **OR** dexAMETHasone (PF) (Decadron) injection 6 mg, 6 mg, intravenous, Daily, Bi Elliott MD, 6 mg at 23    enoxaparin (Lovenox) syringe 40 mg, 40 mg, subcutaneous, q24h, Bi Elliott MD, 40 mg at 23    furosemide (Lasix) injection 20 mg, 20 mg, intravenous, Once, Piter Hannon MD    guaiFENesin (Robitussin) 100 mg/5 mL syrup 200 mg, 200 mg, oral, q4h PRN, Bi Elliott MD    [] hydrALAZINE (Apresoline) injection 10 mg, 10 mg, intravenous, q20 min PRN **FOLLOWED BY** hydrALAZINE (Apresoline) tablet 25 mg, 25 mg, oral, q6h PRN, Bi Elliott MD    levETIRAcetam (Keppra) tablet 750 mg, 750 mg, oral, BID, Lorie Lin MD, 750 mg at 23 09    lisinopril tablet 40 mg, 40 mg, oral, Daily, Bi Elliott MD, 40 mg at 12/14/23 0943    metoprolol succinate XL (Toprol-XL) 24 hr tablet 50 mg, 50 mg, oral, Daily, Bi Elliott MD, 50 mg at 23 0943    oxygen (O2) therapy, , inhalation, Continuous PRN - O2/gases, Alba Yarbrough DO, 3 L/min at 23 0842    polyethylene glycol (Glycolax, Miralax) packet 17 g, 17 g, oral, Daily, Bi Elliott MD, 17 g at 23 0856    polyethylene glycol (Glycolax, Miralax) packet 17 g, 17 g, oral, Daily PRN, " Bi Elliott MD    sodium chloride (Ocean) 0.65 % nasal spray 1 spray, 1 spray, Each Nostril, 4x daily PRN, Piter Hannon MD  No results found for this or any previous visit (from the past 24 hour(s)).  Transthoracic Echo (TTE) Complete   Final Result      MR brain wo IV contrast   Final Result   1. No acute infarct, hemorrhage or mass is noted. The parenchymal   hypodensities may be secondary to chronic microvascular ischemic   changes among others and are similar to the prior exam.        2. The left parietal lobe has an old area of encephalomalacia   corresponding to the acute infarct noted in 2022.        MACRO:   None        Signed by: Jacek Leung 12/10/2023 6:42 PM   Dictation workstation:   [id number]      CT angio head and neck w and wo IV contrast   Final Result   1. 0.6 cm x 0.5 cm x 0.2 cm saccular aneurysm arising the right MCA   bifurcation is noted.        2. No hemodynamically significant intracranial or extracranial   stenosis or occlusion. No evidence of extracranial arterial   dissection.        Please see separate report for noncontrast CT head findings.        MACRO:   None.        Signed by: Alex Banda 12/8/2023 8:08 PM   Dictation workstation:   [id number]      CT chest abdomen pelvis wo IV contrast   Final Result   1. No acute abnormality in the chest, abdomen, or pelvis.        2. Hepatic steatosis.        3. Colonic diverticulosis.             MACRO:   None.        Signed by: Alex Banda 12/8/2023 8:01 PM   Dictation workstation:   [id number]      CT head wo IV contrast   Final Result   No acute intracranial abnormality. If symptoms persist or there is   high clinical suspicion further evaluation with MRI can be considered.        Area of encephalomalacia in the left parietal lobe, new from prior CT   consistent with remote left MCA territory infarct.        Chronic changes as described above.        MACRO:   Brennan Carey discussed the significance and urgency of  this critical   finding by telephone with  ÓSCAR OCAMPO on 12/8/2023 at 7:06 pm.   (**-RCF-**) Findings:  See findings.        Signed by: Brennan Carey 12/8/2023 7:06 PM   Dictation workstation:   [id number]          Assessment/Plan   Principal Problem:    Altered mental status, unspecified altered mental status type  - Likely due to COVID infection, subclinical seizure activity superimposed on residual confusion and receptive aphasia pt has from L parietal stroke.   - Agree with keeping Keppra at 750 mg BID   - MRI brain without acute findings, demonstrates encephalmalacina in the L parietal lobe corresponding with 2022 infarct.   - EEG ordered, unsure when it will be able to be obtained. Likely will not  though given   - Educated patient that they should not drive, operate heavy machinery, work around sharp objects or open flames, work on high surfaces, or swim or bath alone, or any other activity where they would be a risk to himself or others if a seizure were to occur for at least 6 months and until cleared by a neurologist.  - Speech/pt/ot defer to recs   - SW working on guardianship paperwork and placement.   - ID following for Covid PNA, completed remdesivir        Continue medical management. Neurology will sign off, call PRN. Follow up in neurology clinic in 3-4 weeks with PA.  Discussed with neurology attending, Dr. Garcia

## 2023-12-14 NOTE — PROGRESS NOTES
Occupational Therapy    Occupational Therapy Treatment    Name: Jg Starr  MRN: [id number]  : 1960  Date: 23  Time Calculation  Start Time: 1349  Stop Time: 1404  Time Calculation (min): 15 min    Assessment:  OT Assessment: Pt impulsive and distractable, difficulty with communication and dirction following d/t expresive and receptive aphasia.  Prognosis: Good  Evaluation/Treatment Tolerance: Patient tolerated treatment well  Medical Staff Made Aware: Yes  End of Session Communication: Bedside nurse  End of Session Patient Position: Bed, 3 rail up, Alarm on  Plan:  Treatment Interventions: ADL retraining, Functional transfer training, Cognitive reorientation, Patient/family training  OT Frequency:  (2 times per week)  OT Discharge Recommendations: Low intensity level of continued care  OT Recommended Transfer Status: Stand by assist  OT - OK to Discharge: Yes    Subjective   Previous Visit Info:  OT Last Visit  OT Received On: 23  General:  General  Reason for Referral: 62 yo male admitted with altered mental status  Referred By: Dr. Lin  Past Medical History Relevant to Rehab: PMH: HTN, CAD s/p stent, gout, CVA, intracranial aneurysm  Family/Caregiver Present: No  Prior to Session Communication: Bedside nurse  Patient Position Received: Bed, 3 rail up, Alarm off, not on at start of session  Preferred Learning Style: verbal, visual, kinesthetic, written (impacted by aphasia)  General Comment: Pt off O2, pt cooperative with mobility. Difficulty for processing and direction following due to expressive aphasia (baseline per EMR), impulsive.    Precautions:  COVID Droplet/contact, Falls Risk     Pain Assessment:  Pain Assessment  Pain Assessment: 0-10  Pain Score: 0 - No pain     Objective   Activities of Daily Living: Grooming  Grooming Level of Assistance: Setup, Close supervision  Grooming Where Assessed: Edge of bed  Grooming Comments: Pt able to complete oral care task effectively  following set-up at bedside with toothbrush and    LE Dressing  LE Dressing: Yes  Sock Level of Assistance: Setup, Close supervision  LE Dressing Where Assessed: Edge of bed  LE Dressing Comments: increased time on task required.     Bed Mobility/Transfers: Bed Mobility  Bed Mobility: Yes  Bed Mobility 1  Bed Mobility 1: Supine to sitting, Sitting to supine  Level of Assistance 1: Distant supervision    Transfers  Transfer: Yes  Transfer 1  Transfer From 1: Bed to  Transfer to 1: Stand  Technique 1: Sit to stand, Stand to sit  Transfer Device 1:  (no device)  Transfer Level of Assistance 1: Close supervision    Outcome Measures:  [facility] Daily Activity  Putting on and taking off regular lower body clothing: A little  Bathing (including washing, rinsing, drying): None  Putting on and taking off regular upper body clothing: None  Toileting, which includes using toilet, bedpan or urinal: None  Taking care of personal grooming such as brushing teeth: None  Eating Meals: None  Daily Activity - Total Score: 23    Education Documentation  Body Mechanics, taught by JOB Davis at 12/14/2023  3:24 PM.  Learner: Patient  Readiness: Acceptance  Method: Explanation, Demonstration  Response: Demonstrated Understanding, Verbalizes Understanding, Needs Reinforcement  Comment: continued education needed d/t ongoing aphasia    Precautions, taught by JOB Davis at 12/14/2023  3:24 PM.  Learner: Patient  Readiness: Acceptance  Method: Explanation, Demonstration  Response: Demonstrated Understanding, Verbalizes Understanding, Needs Reinforcement  Comment: continued education needed d/t ongoing aphasia    ADL Training, taught by JOB Davis at 12/14/2023  3:24 PM.  Learner: Patient  Readiness: Acceptance  Method: Explanation, Demonstration  Response: Demonstrated Understanding, Verbalizes Understanding, Needs Reinforcement  Comment: continued education needed d/t ongoing aphasia    Education Comments  Pt compliant to  education with difficulty d/t pre-existent aphasia.      Goals:  Encounter Problems       Encounter Problems (Active)       OT Goals       Pt will demo functional mobility necessary to complete ADL routine with mod I (Progressing)       Start:  12/11/23    Expected End:  12/25/23            Pt will demo functional transfers to/ from EOB, chair and commode with mod I  (Progressing)       Start:  12/11/23    Expected End:  12/25/23            Pt will demo ADL routine and meaningful daily activities indep using modifications as needed  (Progressing)       Start:  12/11/23    Expected End:  12/25/23            Pt will demo improved activity tolerance evidenced by participation in BADL and/or functional mobility x10 mins with </=1 rest break.   (Progressing)       Start:  12/11/23    Expected End:  12/25/23

## 2023-12-14 NOTE — CARE PLAN
The patient's goals for the shift include      The clinical goals for the shift include Pt will not fall this shift    Over the shift, the patient did not make progress toward the following goals. Barriers to progression include the patient. Recommendations to address these barriers include educate patient on falls.

## 2023-12-14 NOTE — CARE PLAN
The patient's goals for the shift include      The clinical goals for the shift include Patient will remain hemodynamically stable throughout shift.  Patient will remain hemodynamically stable throughout shift.

## 2023-12-15 LAB
ALBUMIN SERPL BCP-MCNC: 3.5 G/DL (ref 3.4–5)
ANION GAP SERPL CALC-SCNC: 13 MMOL/L (ref 10–20)
BUN SERPL-MCNC: 29 MG/DL (ref 6–23)
CALCIUM SERPL-MCNC: 8.2 MG/DL (ref 8.6–10.3)
CHLORIDE SERPL-SCNC: 104 MMOL/L (ref 98–107)
CO2 SERPL-SCNC: 23 MMOL/L (ref 21–32)
CREAT SERPL-MCNC: 0.95 MG/DL (ref 0.5–1.3)
ERYTHROCYTE [DISTWIDTH] IN BLOOD BY AUTOMATED COUNT: 12.6 % (ref 11.5–14.5)
GFR SERPL CREATININE-BSD FRML MDRD: >90 ML/MIN/1.73M*2
GLUCOSE SERPL-MCNC: 106 MG/DL (ref 74–99)
HCT VFR BLD AUTO: 45.2 % (ref 41–52)
HGB BLD-MCNC: 14.6 G/DL (ref 13.5–17.5)
MAGNESIUM SERPL-MCNC: 2.21 MG/DL (ref 1.6–2.4)
MCH RBC QN AUTO: 26.1 PG (ref 26–34)
MCHC RBC AUTO-ENTMCNC: 32.3 G/DL (ref 32–36)
MCV RBC AUTO: 81 FL (ref 80–100)
NRBC BLD-RTO: 0 /100 WBCS (ref 0–0)
PHOSPHATE SERPL-MCNC: 3.9 MG/DL (ref 2.5–4.9)
PLATELET # BLD AUTO: 318 X10*3/UL (ref 150–450)
POTASSIUM SERPL-SCNC: 4 MMOL/L (ref 3.5–5.3)
RBC # BLD AUTO: 5.6 X10*6/UL (ref 4.5–5.9)
SODIUM SERPL-SCNC: 136 MMOL/L (ref 136–145)
WBC # BLD AUTO: 13 X10*3/UL (ref 4.4–11.3)

## 2023-12-15 PROCEDURE — 83735 ASSAY OF MAGNESIUM: CPT

## 2023-12-15 PROCEDURE — 85027 COMPLETE CBC AUTOMATED: CPT

## 2023-12-15 PROCEDURE — 99233 SBSQ HOSP IP/OBS HIGH 50: CPT | Performed by: STUDENT IN AN ORGANIZED HEALTH CARE EDUCATION/TRAINING PROGRAM

## 2023-12-15 PROCEDURE — 2500000001 HC RX 250 WO HCPCS SELF ADMINISTERED DRUGS (ALT 637 FOR MEDICARE OP): Performed by: INTERNAL MEDICINE

## 2023-12-15 PROCEDURE — 36415 COLL VENOUS BLD VENIPUNCTURE: CPT

## 2023-12-15 PROCEDURE — 94760 N-INVAS EAR/PLS OXIMETRY 1: CPT

## 2023-12-15 PROCEDURE — 2500000004 HC RX 250 GENERAL PHARMACY W/ HCPCS (ALT 636 FOR OP/ED): Performed by: INTERNAL MEDICINE

## 2023-12-15 PROCEDURE — 92507 TX SP LANG VOICE COMM INDIV: CPT | Mod: GN

## 2023-12-15 PROCEDURE — 96372 THER/PROPH/DIAG INJ SC/IM: CPT | Performed by: INTERNAL MEDICINE

## 2023-12-15 PROCEDURE — 2060000001 HC INTERMEDIATE ICU ROOM DAILY

## 2023-12-15 PROCEDURE — 80069 RENAL FUNCTION PANEL: CPT

## 2023-12-15 RX ORDER — SPIRONOLACTONE 25 MG/1
25 TABLET ORAL DAILY
Status: DISCONTINUED | OUTPATIENT
Start: 2023-12-16 | End: 2023-12-19 | Stop reason: HOSPADM

## 2023-12-15 RX ADMIN — LISINOPRIL 40 MG: 20 TABLET ORAL at 09:12

## 2023-12-15 RX ADMIN — ENOXAPARIN SODIUM 40 MG: 40 INJECTION SUBCUTANEOUS at 09:12

## 2023-12-15 RX ADMIN — ASPIRIN 81 MG: 81 TABLET, COATED ORAL at 09:12

## 2023-12-15 RX ADMIN — METOPROLOL SUCCINATE 50 MG: 50 TABLET, EXTENDED RELEASE ORAL at 09:13

## 2023-12-15 RX ADMIN — DEXAMETHASONE 6 MG: 6 TABLET ORAL at 09:13

## 2023-12-15 RX ADMIN — LEVETIRACETAM 750 MG: 500 TABLET, FILM COATED ORAL at 09:12

## 2023-12-15 RX ADMIN — LEVETIRACETAM 750 MG: 500 TABLET, FILM COATED ORAL at 22:13

## 2023-12-15 RX ADMIN — ATORVASTATIN CALCIUM 80 MG: 80 TABLET, FILM COATED ORAL at 22:17

## 2023-12-15 ASSESSMENT — COGNITIVE AND FUNCTIONAL STATUS - GENERAL
MOBILITY SCORE: 23
CLIMB 3 TO 5 STEPS WITH RAILING: A LITTLE
CLIMB 3 TO 5 STEPS WITH RAILING: A LITTLE
MOBILITY SCORE: 23
DAILY ACTIVITIY SCORE: 24
DAILY ACTIVITIY SCORE: 24

## 2023-12-15 ASSESSMENT — PAIN SCALES - GENERAL
PAINLEVEL_OUTOF10: 0 - NO PAIN

## 2023-12-15 ASSESSMENT — PAIN - FUNCTIONAL ASSESSMENT
PAIN_FUNCTIONAL_ASSESSMENT: 0-10

## 2023-12-15 NOTE — PROGRESS NOTES
Jg Starr is a 63 y.o. male on day 7 of admission presenting with Altered mental status, unspecified altered mental status type.      Subjective   Feels well today.  Mentation is clear.  No shortness of breath.    Objective     Last Recorded Vitals  /73   Pulse 61   Temp 35.9 °C (96.6 °F)   Resp 17   Wt 126 kg (276 lb 14.4 oz)   SpO2 91%   Intake/Output last 3 Shifts:    Intake/Output Summary (Last 24 hours) at 12/15/2023 1838  Last data filed at 12/15/2023 1400  Gross per 24 hour   Intake 480 ml   Output --   Net 480 ml         Admission Weight  Weight: 126 kg (276 lb 14.4 oz) (12/08/23 1917)    Daily Weight  12/08/23 : 126 kg (276 lb 14.4 oz)    Physical exam  Con: awake, alert; no distress;   Eyes: conjunctiva wnl; EOMI;   ENMT: hearing intact; MMM;   MSK: ROM wnl; digits wnl; trace BLE edema  Resp: normal work of breathing on room air; no cyanosis;   Neuro: moving all extremities; no abnormal movements  Psych: oriented to situation; affect wnl;     Relevant Results  Results for orders placed or performed during the hospital encounter of 12/08/23 (from the past 24 hour(s))   Renal Function Panel   Result Value Ref Range    Glucose 106 (H) 74 - 99 mg/dL    Sodium 136 136 - 145 mmol/L    Potassium 4.0 3.5 - 5.3 mmol/L    Chloride 104 98 - 107 mmol/L    Bicarbonate 23 21 - 32 mmol/L    Anion Gap 13 10 - 20 mmol/L    Urea Nitrogen 29 (H) 6 - 23 mg/dL    Creatinine 0.95 0.50 - 1.30 mg/dL    eGFR >90 >60 mL/min/1.73m*2    Calcium 8.2 (L) 8.6 - 10.3 mg/dL    Phosphorus 3.9 2.5 - 4.9 mg/dL    Albumin 3.5 3.4 - 5.0 g/dL   CBC   Result Value Ref Range    WBC 13.0 (H) 4.4 - 11.3 x10*3/uL    nRBC 0.0 0.0 - 0.0 /100 WBCs    RBC 5.60 4.50 - 5.90 x10*6/uL    Hemoglobin 14.6 13.5 - 17.5 g/dL    Hematocrit 45.2 41.0 - 52.0 %    MCV 81 80 - 100 fL    MCH 26.1 26.0 - 34.0 pg    MCHC 32.3 32.0 - 36.0 g/dL    RDW 12.6 11.5 - 14.5 %    Platelets 318 150 - 450 x10*3/uL   Magnesium   Result Value Ref Range    Magnesium  2.21 1.60 - 2.40 mg/dL          Assessment/Plan    63 y.o. male with a past medical history of hypertension, CAD, gout, CVA, intracranial aneurysm and medical noncompliance who was brought to the hospital for altered mental status. Also noted to be positive for Covid, s/p Remdesivir therapy.  CT chest showed no infiltrate, but mild pulmonary edema.  Echo showed reduced EF.  Encephalopathy and word finding difficulty resolved after starting Keppra.     # Encephalopathy, agitation, aphasia, witnessed seizure with EMS: Resolved after starting therapeutic dose of Keppra; very high likelihood that neurologic symptoms are driven by seizure  # Hx of CVA with residual encephalomalacia in the left parietal lobe: high risk for seizure disorder; repeat MRI with no new changes  - Continue keppra 750mg BID   - PT/OT and speech therapy consulted   - Neurology consulted     #AHRF 2/2 decompensated systolic CHF: given IV lasix x1, weaned off O2  #CAD/ICM  - EF 40-45%  - Continue ASA, metoprolol and lisinopril   - Start spironolactone     #COVID  - Patient was noted to be Covid +ve at the time of admission  - S/p Remdesivir and decadron   - Procalcitonin 0.13  - 1 of 2 Blood cultures contaminated with multiple organisms, the other is negative  - Will hold of IV Abx therapy at this time   - ID following    Fluid: Oral '  Electrolyte: Monitor and replete   Nutrition: regular   DVTppx: SubQ lovenox  Gippx: None     Dispo: Son has applied for and received emergency guardianship due to AMS.  Medically ready for discharge, pending placement.         Piter Hannon MD

## 2023-12-15 NOTE — CARE PLAN
The clinical goals for the shift include Patient will remain fall free thoughout the shift.      Problem: Pain  Goal: My pain/discomfort is manageable  Outcome: Progressing     Problem: Safety  Goal: Patient will be injury free during hospitalization  Outcome: Progressing  Goal: I will remain free of falls  Outcome: Progressing     Problem: Daily Care  Goal: Daily care needs are met  Outcome: Progressing     Problem: Psychosocial Needs  Goal: Demonstrates ability to cope with hospitalization/illness  Outcome: Progressing  Goal: Collaborate with me, my family, and caregiver to identify my specific goals  Outcome: Progressing     Patient had an uneventful night. Patient rested comfortably throughout the night. VS stable.

## 2023-12-15 NOTE — CARE PLAN
The patient's goals for the shift include      The clinical goals for the shift include pt will remain HDS throughout shift    Over the shift, the patient did make progress toward the following goals. Barriers to progression include covid +. Recommendations to address these barriers include maintain pt on room air as tolerated.

## 2023-12-15 NOTE — PROGRESS NOTES
Speech-Language Pathology    SLP ADULT Inpatient Speech-Language Cognition    Patient Name: Jg Starr  MRN: [id number]  Today's Date: 12/15/2023   Time Calculation  Start Time: 1230  Stop Time: 1251  Time Calculation (min): 21 min         Current Problem:   1. Altered mental status, unspecified altered mental status type        2. Seizure-like activity (CMS/HCC)        3. Saccular aneurysm        4. COVID-19        5. Hypertensive emergency        6. Cerebrovascular accident (CVA), unspecified mechanism (CMS/HCC)  Transthoracic Echo (TTE) Complete    Transthoracic Echo (TTE) Complete            SLP Assessment:    SLP Assessment Results: Improving functional communication. Mild receptive and expressive aphasia.  Prognosis: good  Treatment Provided: yes  Treatment Tolerance: good      SLP Plan:  Plan  Inpatient/Swing Bed or Outpatient: Inpatient  Treatment/Interventions: Communication functioning, Executive functioning, Expressive Language, Receptive Language  SLP TX Plan: Continue Plan of Care  SLP Plan: Skilled SLP  SLP Frequency: 3x per week  Duration: Current admission  Discussed POC: Patient, Nursing  Discussed Risks/Benefits: Yes  Patient/Caregiver Agreeable: Yes      Subjective   Pt admitting to having difficulty understanding people, but only because of the masks. He was alert, pleasant and cooperative for the therapy session today.     General Visit Information:  General Information  Caregiver Feedback: improving communication with nursing.  Reason for Referral: Aphasia  Referred By: Dr. Lin    Objective     Pain:  Pain Assessment  Pain Assessment: 0-10  Pain Score: 0 - No pain      Cognition:  Cognition  Overall Cognitive Status: Impaired  Arousal/Alertness: Appropriate responses to stimuli  Orientation Level: Correctly reported orientation to day, month, location. Required cues for diagnosis.   Following Commands: Followed one step commands with 90% accuracy. Answered yes no questions with 80%  accuracy. He required repetition , gestures and cues at times to maximize comprehension.   Awareness of Deficits: Improving.   Attention Span: Functional for 20 minute session.     Motor Speech Production:  Motor Speech Production  Oral Motor : WFL  Automatic Speech: WFL for sequences like days of the week, months of the year. Min assist required for him to identify and report the appropriate word to complete a sentence.   Repetition: 80% for phrases.   Intelligibility: WFL  Diadochokinetic Rate: WFL  Paralinguistic Features: WFL  Respiratory Support: WFL      Auditory Comprehension:   Yes/No Questions: 80%  Basic Questions: 95%  Commands: 90%    Verbal:  Primary Mode of Expression: Verbal  Primary Language: English  Confrontation Namin/10 correct  Responsive Naming: Impaired by auditory comprehension deficits.   Responsive Naming Comments: 0%  Affect: Within Functional Limits  Eye Contact: Within Functional Limits  Topic Initiation: Unable to assess       Goals:  Pt will participate in assessment of functional communication with min assist. Status- improving  Pt will respond to simple commands with min assist and 90% accuracy. Status- improving  Pt will verbalize appropriately to questions with min assist 90% to improve functional communication of wants and needs. Status. Improving.

## 2023-12-15 NOTE — PROGRESS NOTES
Jg Starr is a 63 y.o. male on day 7 of admission presenting with Altered mental status, unspecified altered mental status type.    Subjective   Interval History: no fever, no new complaints, the hx is not reliable         Review of Systems    Objective   Range of Vitals (last 24 hours)  Heart Rate:  [56-80]   Temp:  [36.3 °C (97.3 °F)-37.1 °C (98.7 °F)]   Resp:  [16-18]   BP: (132-159)/(77-94)   SpO2:  [89 %-94 %]   Daily Weight  12/08/23 : 126 kg (276 lb 14.4 oz)    Body mass index is 35.55 kg/m².    Physical Exam  Constitutional:       Appearance: Normal appearance.   HENT:      Head: Normocephalic and atraumatic.      Mouth/Throat:      Mouth: Mucous membranes are moist.      Pharynx: Oropharynx is clear.   Eyes:      Pupils: Pupils are equal, round, and reactive to light.   Cardiovascular:      Rate and Rhythm: Normal rate and regular rhythm.      Heart sounds: Normal heart sounds.   Pulmonary:      Effort: Pulmonary effort is normal.      Breath sounds: Normal breath sounds.   Abdominal:      General: Abdomen is flat. Bowel sounds are normal.      Palpations: Abdomen is soft.   Musculoskeletal:      Cervical back: Normal range of motion.   Neurological:      Mental Status: He is alert.         Antibiotics  LORazepam (Ativan) injection  - Omnicell Override Pull  LORazepam (Ativan) injection 2 mg  levETIRAcetam in NaCl (iso-os) (Keppra) IV 1,000 mg  iohexol (OMNIPaque) 350 mg iodine/mL solution 85 mL  labetaloL (Normodyne,Trandate) injection 5 mg  labetaloL (Normodyne,Trandate) injection 5 mg  labetaloL (Normodyne,Trandate) injection 10 mg  aspirin chewable tablet 81 mg  atorvastatin (Lipitor) tablet 80 mg  lisinopril tablet 40 mg  metoprolol succinate XL (Toprol-XL) 24 hr tablet 50 mg  oxygen (O2) therapy  perflutren lipid microspheres (Definity) injection 3 mL of dilution  labetaloL (Normodyne,Trandate) injection 10 mg  hydrALAZINE (Apresoline) injection 10 mg  hydrALAZINE (Apresoline) tablet 25  mg  polyethylene glycol (Glycolax, Miralax) packet 17 g  enoxaparin (Lovenox) syringe 40 mg  atorvastatin (Lipitor) tablet 80 mg  aspirin EC tablet 81 mg  acetaminophen (Tylenol) tablet 650 mg  dexAMETHasone (Decadron) tablet 6 mg  dexAMETHasone oral liquid 6 mg  dexAMETHasone (PF) (Decadron) injection 6 mg  polyethylene glycol (Glycolax, Miralax) packet 17 g  guaiFENesin (Robitussin) 100 mg/5 mL syrup 200 mg  aspirin suppository 300 mg  oxygen (O2) therapy  azithromycin (Zithromax) in dextrose 5 % in water (D5W) 250 mL  mg  cefTRIAXone (Rocephin) 2 g IV in dextrose 5% 50 mL  remdesivir (Veklury) 200 mg in sodium chloride 0.9% 250 mL IV  remdesivir (Veklury) 100 mg in sodium chloride 0.9% 250 mL IV  LORazepam (Ativan) injection 2 mg      Relevant Results  Labs  Results from last 72 hours   Lab Units 12/15/23  0607   WBC AUTO x10*3/uL 13.0*   HEMOGLOBIN g/dL 14.6   HEMATOCRIT % 45.2   PLATELETS AUTO x10*3/uL 318       Results from last 72 hours   Lab Units 12/15/23  0607 12/13/23  0556   SODIUM mmol/L 136 138   POTASSIUM mmol/L 4.0 4.0   CHLORIDE mmol/L 104 104   CO2 mmol/L 23 26   BUN mg/dL 29* 31*   CREATININE mg/dL 0.95 1.00   GLUCOSE mg/dL 106* 112*   CALCIUM mg/dL 8.2* 8.2*   ANION GAP mmol/L 13 12   EGFR mL/min/1.73m*2 >90 86   PHOSPHORUS mg/dL 3.9  --        Results from last 72 hours   Lab Units 12/15/23  0607   ALBUMIN g/dL 3.5       Estimated Creatinine Clearance: 115.2 mL/min (by C-G formula based on SCr of 0.95 mg/dL).  C-Reactive Protein   Date Value Ref Range Status   12/12/2023 0.22 <1.00 mg/dL Final   12/09/2023 2.89 (H) <1.00 mg/dL Final     Microbiology  Reviewed  Imaging  reviewed        Assessment/Plan   Respiratory failure / pneumonia / COVID19 infection, procalcitonin 0.13, completed Remdesivir   Encephalopathy, likely metabolic  Bacteremia, CNS / psychrobacter, likely a contaminant      Recommendations :  Supportive care  Discussed with the medical team     I spent minutes in the  professional and overall care of this patient.      Nazia Sibley MD

## 2023-12-16 PROCEDURE — 2060000001 HC INTERMEDIATE ICU ROOM DAILY

## 2023-12-16 PROCEDURE — 2500000004 HC RX 250 GENERAL PHARMACY W/ HCPCS (ALT 636 FOR OP/ED): Performed by: INTERNAL MEDICINE

## 2023-12-16 PROCEDURE — 2500000001 HC RX 250 WO HCPCS SELF ADMINISTERED DRUGS (ALT 637 FOR MEDICARE OP): Performed by: INTERNAL MEDICINE

## 2023-12-16 PROCEDURE — 96372 THER/PROPH/DIAG INJ SC/IM: CPT | Performed by: INTERNAL MEDICINE

## 2023-12-16 PROCEDURE — 2500000004 HC RX 250 GENERAL PHARMACY W/ HCPCS (ALT 636 FOR OP/ED): Performed by: STUDENT IN AN ORGANIZED HEALTH CARE EDUCATION/TRAINING PROGRAM

## 2023-12-16 PROCEDURE — 99232 SBSQ HOSP IP/OBS MODERATE 35: CPT | Performed by: STUDENT IN AN ORGANIZED HEALTH CARE EDUCATION/TRAINING PROGRAM

## 2023-12-16 RX ADMIN — LISINOPRIL 40 MG: 20 TABLET ORAL at 09:48

## 2023-12-16 RX ADMIN — SPIRONOLACTONE 25 MG: 25 TABLET ORAL at 09:48

## 2023-12-16 RX ADMIN — LEVETIRACETAM 750 MG: 500 TABLET, FILM COATED ORAL at 20:09

## 2023-12-16 RX ADMIN — METOPROLOL SUCCINATE 50 MG: 50 TABLET, EXTENDED RELEASE ORAL at 09:47

## 2023-12-16 RX ADMIN — ENOXAPARIN SODIUM 40 MG: 40 INJECTION SUBCUTANEOUS at 09:50

## 2023-12-16 RX ADMIN — LEVETIRACETAM 750 MG: 500 TABLET, FILM COATED ORAL at 09:48

## 2023-12-16 RX ADMIN — ASPIRIN 81 MG: 81 TABLET, COATED ORAL at 09:47

## 2023-12-16 RX ADMIN — ATORVASTATIN CALCIUM 80 MG: 80 TABLET, FILM COATED ORAL at 20:09

## 2023-12-16 ASSESSMENT — COGNITIVE AND FUNCTIONAL STATUS - GENERAL
MOBILITY SCORE: 23
CLIMB 3 TO 5 STEPS WITH RAILING: A LITTLE
MOBILITY SCORE: 23
CLIMB 3 TO 5 STEPS WITH RAILING: A LITTLE
DAILY ACTIVITIY SCORE: 24

## 2023-12-16 ASSESSMENT — PAIN SCALES - GENERAL
PAINLEVEL_OUTOF10: 0 - NO PAIN
PAINLEVEL_OUTOF10: 0 - NO PAIN

## 2023-12-16 ASSESSMENT — PAIN - FUNCTIONAL ASSESSMENT
PAIN_FUNCTIONAL_ASSESSMENT: 0-10
PAIN_FUNCTIONAL_ASSESSMENT: 0-10

## 2023-12-16 NOTE — CARE PLAN
The patient's goals for the shift include      The clinical goals for the shift include Patient will remain HDS during this shift    Over the shift, the patient did make progress toward the following goals.

## 2023-12-16 NOTE — PROGRESS NOTES
Jg Starr is a 63 y.o. male on day 8 of admission presenting with Altered mental status, unspecified altered mental status type.      Subjective   Feels well today.  Mentation is clear.  No shortness of breath.    Objective     Last Recorded Vitals  /71 (BP Location: Left arm, Patient Position: Sitting) Comment (BP Location): Lower  Pulse 75   Temp 36.5 °C (97.7 °F) (Axillary)   Resp 19   Wt 126 kg (276 lb 14.4 oz)   SpO2 96%   Intake/Output last 3 Shifts:    Intake/Output Summary (Last 24 hours) at 12/16/2023 1355  Last data filed at 12/15/2023 1400  Gross per 24 hour   Intake 240 ml   Output --   Net 240 ml         Admission Weight  Weight: 126 kg (276 lb 14.4 oz) (12/08/23 1917)    Daily Weight  12/08/23 : 126 kg (276 lb 14.4 oz)    Physical exam  Con: awake, alert; no distress;   Eyes: conjunctiva wnl; EOMI;   ENMT: hearing intact; MMM;   MSK: ROM wnl; digits wnl; trace BLE edema  Resp: normal work of breathing on room air; no cyanosis;   Neuro: moving all extremities; no abnormal movements  Psych: oriented to situation; affect wnl;     Relevant Results  Lab Results   Component Value Date    GLUCOSE 106 (H) 12/15/2023    CALCIUM 8.2 (L) 12/15/2023     12/15/2023    K 4.0 12/15/2023    CO2 23 12/15/2023     12/15/2023    BUN 29 (H) 12/15/2023    CREATININE 0.95 12/15/2023          Assessment/Plan    63 y.o. male with a past medical history of hypertension, CAD, gout, CVA, intracranial aneurysm and medical noncompliance who was brought to the hospital for altered mental status. Also noted to be positive for Covid, s/p Remdesivir therapy.  CT chest showed no infiltrate, but mild pulmonary edema.  Echo showed reduced EF.  Encephalopathy and word finding difficulty resolved after starting Keppra.     # Encephalopathy, agitation, aphasia, witnessed seizure with EMS: Resolved after starting therapeutic dose of Keppra; very high likelihood that neurologic symptoms are driven by seizure  #  Hx of CVA with residual encephalomalacia in the left parietal lobe: high risk for seizure disorder; repeat MRI with no new changes  - Continue keppra 750mg BID   - PT/OT and speech therapy consulted   - Neurology consulted     #AHRF 2/2 decompensated systolic CHF: given IV lasix x1, weaned off O2  #CAD/ICM  - EF 40-45%  - Continue ASA, metoprolol and lisinopril   - Started spironolactone   - trend renal labs, Mag     #COVID  - Patient was noted to be Covid +ve at the time of admission  - S/p Remdesivir and decadron   - Procalcitonin 0.13  - 1 of 2 Blood cultures contaminated with multiple organisms, the other is negative  - ID following    Fluid: Oral '  Electrolyte: Monitor and replete   Nutrition: regular   DVTppx: SubQ lovenox  Gippx: None     Dispo: Son has applied for and received emergency guardianship due to AMS. Family updated today. Medically ready for discharge, pending placement.         Piter Hannon MD

## 2023-12-16 NOTE — CARE PLAN
The clinical goals for the shift include Patient will remain on room air throughout shift.      Problem: Pain  Goal: My pain/discomfort is manageable  Outcome: Progressing     Problem: Safety  Goal: Patient will be injury free during hospitalization  Outcome: Progressing  Goal: I will remain free of falls  Outcome: Progressing     Problem: Daily Care  Goal: Daily care needs are met  Outcome: Progressing     Problem: Psychosocial Needs  Goal: Demonstrates ability to cope with hospitalization/illness  Outcome: Progressing  Goal: Collaborate with me, my family, and caregiver to identify my specific goals  Outcome: Progressing    Patient rested comfortably throughout the night. Pt would lay on side and SpO2 would desat to the mid 80s. RN would reposition patient and SpO2 would go back up to above 92%.

## 2023-12-16 NOTE — PROGRESS NOTES
Jg Starr is a 63 y.o. male on day 8 of admission presenting with Altered mental status, unspecified altered mental status type.    Subjective   Interval History: no fever, no new complaints, the hx is not reliable         Review of Systems    Objective   Range of Vitals (last 24 hours)  Heart Rate:  [61-76]   Temp:  [35.9 °C (96.6 °F)-36.5 °C (97.7 °F)]   Resp:  [16-19]   BP: (103-124)/(70-78)   SpO2:  [90 %-96 %]   Daily Weight  12/08/23 : 126 kg (276 lb 14.4 oz)    Body mass index is 35.55 kg/m².    Physical Exam  Constitutional:       Appearance: Normal appearance.   HENT:      Head: Normocephalic and atraumatic.      Mouth/Throat:      Mouth: Mucous membranes are moist.      Pharynx: Oropharynx is clear.   Eyes:      Pupils: Pupils are equal, round, and reactive to light.   Cardiovascular:      Rate and Rhythm: Normal rate and regular rhythm.      Heart sounds: Normal heart sounds.   Pulmonary:      Effort: Pulmonary effort is normal.      Breath sounds: Normal breath sounds.   Abdominal:      General: Abdomen is flat. Bowel sounds are normal.      Palpations: Abdomen is soft.   Musculoskeletal:      Cervical back: Normal range of motion.   Neurological:      Mental Status: He is alert.         Antibiotics  LORazepam (Ativan) injection  - Omnicell Override Pull  LORazepam (Ativan) injection 2 mg  levETIRAcetam in NaCl (iso-os) (Keppra) IV 1,000 mg  iohexol (OMNIPaque) 350 mg iodine/mL solution 85 mL  labetaloL (Normodyne,Trandate) injection 5 mg  labetaloL (Normodyne,Trandate) injection 5 mg  labetaloL (Normodyne,Trandate) injection 10 mg  aspirin chewable tablet 81 mg  atorvastatin (Lipitor) tablet 80 mg  lisinopril tablet 40 mg  metoprolol succinate XL (Toprol-XL) 24 hr tablet 50 mg  oxygen (O2) therapy  perflutren lipid microspheres (Definity) injection 3 mL of dilution  labetaloL (Normodyne,Trandate) injection 10 mg  hydrALAZINE (Apresoline) injection 10 mg  hydrALAZINE (Apresoline) tablet 25  mg  polyethylene glycol (Glycolax, Miralax) packet 17 g  enoxaparin (Lovenox) syringe 40 mg  atorvastatin (Lipitor) tablet 80 mg  aspirin EC tablet 81 mg  acetaminophen (Tylenol) tablet 650 mg  dexAMETHasone (Decadron) tablet 6 mg  dexAMETHasone oral liquid 6 mg  dexAMETHasone (PF) (Decadron) injection 6 mg  polyethylene glycol (Glycolax, Miralax) packet 17 g  guaiFENesin (Robitussin) 100 mg/5 mL syrup 200 mg  aspirin suppository 300 mg  oxygen (O2) therapy  azithromycin (Zithromax) in dextrose 5 % in water (D5W) 250 mL  mg  cefTRIAXone (Rocephin) 2 g IV in dextrose 5% 50 mL  remdesivir (Veklury) 200 mg in sodium chloride 0.9% 250 mL IV  remdesivir (Veklury) 100 mg in sodium chloride 0.9% 250 mL IV  LORazepam (Ativan) injection 2 mg      Relevant Results  Labs  Results from last 72 hours   Lab Units 12/15/23  0607   WBC AUTO x10*3/uL 13.0*   HEMOGLOBIN g/dL 14.6   HEMATOCRIT % 45.2   PLATELETS AUTO x10*3/uL 318       Results from last 72 hours   Lab Units 12/15/23  0607   SODIUM mmol/L 136   POTASSIUM mmol/L 4.0   CHLORIDE mmol/L 104   CO2 mmol/L 23   BUN mg/dL 29*   CREATININE mg/dL 0.95   GLUCOSE mg/dL 106*   CALCIUM mg/dL 8.2*   ANION GAP mmol/L 13   EGFR mL/min/1.73m*2 >90   PHOSPHORUS mg/dL 3.9       Results from last 72 hours   Lab Units 12/15/23  0607   ALBUMIN g/dL 3.5       Estimated Creatinine Clearance: 115.2 mL/min (by C-G formula based on SCr of 0.95 mg/dL).  C-Reactive Protein   Date Value Ref Range Status   12/12/2023 0.22 <1.00 mg/dL Final   12/09/2023 2.89 (H) <1.00 mg/dL Final     Microbiology  Reviewed  Imaging  reviewed        Assessment/Plan   Respiratory failure / pneumonia / COVID19 infection, procalcitonin 0.13, completed Remdesivir   Encephalopathy, likely metabolic  Bacteremia, CNS / psychrobacter, likely a contaminant      Recommendations :  Supportive care  Discussed with the medical team     I spent minutes in the professional and overall care of this patient.      Nazia Sibley,  MD

## 2023-12-17 PROCEDURE — 2500000004 HC RX 250 GENERAL PHARMACY W/ HCPCS (ALT 636 FOR OP/ED): Performed by: INTERNAL MEDICINE

## 2023-12-17 PROCEDURE — 94762 N-INVAS EAR/PLS OXIMTRY CONT: CPT

## 2023-12-17 PROCEDURE — 96372 THER/PROPH/DIAG INJ SC/IM: CPT | Performed by: INTERNAL MEDICINE

## 2023-12-17 PROCEDURE — 2500000004 HC RX 250 GENERAL PHARMACY W/ HCPCS (ALT 636 FOR OP/ED): Performed by: STUDENT IN AN ORGANIZED HEALTH CARE EDUCATION/TRAINING PROGRAM

## 2023-12-17 PROCEDURE — 2500000001 HC RX 250 WO HCPCS SELF ADMINISTERED DRUGS (ALT 637 FOR MEDICARE OP): Performed by: INTERNAL MEDICINE

## 2023-12-17 PROCEDURE — 2060000001 HC INTERMEDIATE ICU ROOM DAILY

## 2023-12-17 PROCEDURE — 99232 SBSQ HOSP IP/OBS MODERATE 35: CPT | Performed by: STUDENT IN AN ORGANIZED HEALTH CARE EDUCATION/TRAINING PROGRAM

## 2023-12-17 RX ADMIN — ASPIRIN 81 MG: 81 TABLET, COATED ORAL at 09:00

## 2023-12-17 RX ADMIN — LISINOPRIL 40 MG: 20 TABLET ORAL at 09:03

## 2023-12-17 RX ADMIN — LEVETIRACETAM 750 MG: 500 TABLET, FILM COATED ORAL at 09:01

## 2023-12-17 RX ADMIN — METOPROLOL SUCCINATE 50 MG: 50 TABLET, EXTENDED RELEASE ORAL at 09:00

## 2023-12-17 RX ADMIN — ATORVASTATIN CALCIUM 80 MG: 80 TABLET, FILM COATED ORAL at 20:46

## 2023-12-17 RX ADMIN — ENOXAPARIN SODIUM 40 MG: 40 INJECTION SUBCUTANEOUS at 09:00

## 2023-12-17 RX ADMIN — SPIRONOLACTONE 25 MG: 25 TABLET ORAL at 09:00

## 2023-12-17 RX ADMIN — LEVETIRACETAM 750 MG: 500 TABLET, FILM COATED ORAL at 20:46

## 2023-12-17 ASSESSMENT — COGNITIVE AND FUNCTIONAL STATUS - GENERAL
CLIMB 3 TO 5 STEPS WITH RAILING: A LITTLE
DAILY ACTIVITIY SCORE: 24
CLIMB 3 TO 5 STEPS WITH RAILING: A LITTLE
MOBILITY SCORE: 23
MOBILITY SCORE: 23
DAILY ACTIVITIY SCORE: 24

## 2023-12-17 ASSESSMENT — PAIN SCALES - GENERAL
PAINLEVEL_OUTOF10: 0 - NO PAIN
PAINLEVEL_OUTOF10: 0 - NO PAIN

## 2023-12-17 NOTE — CARE PLAN
The patient's goals for the shift include  remain comfortable    The clinical goals for the shift include pt will remain hds    Over the shift, the patient did not make progress toward the following goals. Barriers to progression include pt is disoriented to situation. Recommendations to address these barriers include continue with plan of care.

## 2023-12-17 NOTE — PROGRESS NOTES
Jg Starr is a 63 y.o. male on day 9 of admission presenting with Altered mental status, unspecified altered mental status type.      Subjective   Feels well today.  Mentation is clear.  No shortness of breath.    Objective     Last Recorded Vitals  /77 (BP Location: Left arm, Patient Position: Sitting) Comment (BP Location): Lower  Pulse 84   Temp 36.2 °C (97.2 °F) (Temporal)   Resp 16   Wt 126 kg (276 lb 14.4 oz)   SpO2 90%   Intake/Output last 3 Shifts:  No intake or output data in the 24 hours ending 12/17/23 1236      Admission Weight  Weight: 126 kg (276 lb 14.4 oz) (12/08/23 1917)    Daily Weight  12/08/23 : 126 kg (276 lb 14.4 oz)    Physical exam  Con: awake, alert; no distress;   Eyes: conjunctiva wnl; EOMI;   ENMT: hearing mildly diminished; MMM;   MSK: ROM wnl; digits wnl; trace BLE edema  Resp: normal work of breathing on room air; no cyanosis;   Neuro: moving all extremities; no abnormal movements  Psych: oriented to situation; affect wnl;     Relevant Results  Lab Results   Component Value Date    GLUCOSE 106 (H) 12/15/2023    CALCIUM 8.2 (L) 12/15/2023     12/15/2023    K 4.0 12/15/2023    CO2 23 12/15/2023     12/15/2023    BUN 29 (H) 12/15/2023    CREATININE 0.95 12/15/2023          Assessment/Plan    63 y.o. male with a past medical history of hypertension, CAD, gout, CVA, intracranial aneurysm and medical noncompliance who was brought to the hospital for altered mental status. Also noted to be positive for Covid, s/p Remdesivir therapy.  CT chest showed no infiltrate, but mild pulmonary edema.  Echo showed reduced EF.  Encephalopathy and word finding difficulty resolved after starting Keppra.     # Encephalopathy, agitation, aphasia, witnessed seizure with EMS: Resolved after starting therapeutic dose of Keppra; very high likelihood that neurologic symptoms are driven by seizure  # Hx of CVA with residual encephalomalacia in the left parietal lobe: high risk for  seizure disorder; repeat MRI with no new changes  - Continue keppra 750mg BID   - PT/OT and speech therapy consulted   - Neurology consulted   - Some degree of his abnormal responses may be due to mild hearing loss.  Audiology referral placed for outpatient    #AHRF 2/2 decompensated systolic CHF: given IV lasix x1, weaned off O2  #CAD/ICM  - EF 40-45%  - Continue ASA, metoprolol and lisinopril   - Started spironolactone   - trend renal labs, Mag     #COVID  - Patient was noted to be Covid +ve at the time of admission  - S/p Remdesivir and decadron   - Procalcitonin 0.13  - 1 of 2 Blood cultures contaminated with multiple organisms, the other is negative  - ID following    Fluid: Oral '  Electrolyte: Monitor and replete   Nutrition: regular   DVTppx: SubQ lovenox  Gippx: None     Dispo: Son has applied for and received emergency guardianship due to AMS. Medically ready for discharge, pending placement.         Piter Hannon MD

## 2023-12-17 NOTE — PROGRESS NOTES
Jg Starr is a 63 y.o. male on day 9 of admission presenting with Altered mental status, unspecified altered mental status type.    Subjective   Interval History: no fever, no new complaints, the hx is not reliable         Review of Systems    Objective   Range of Vitals (last 24 hours)  Heart Rate:  [69-84]   Temp:  [36.2 °C (97.2 °F)-36.7 °C (98.1 °F)]   Resp:  [16-18]   BP: (107-147)/(71-85)   SpO2:  [90 %-95 %]   Daily Weight  12/08/23 : 126 kg (276 lb 14.4 oz)    Body mass index is 35.55 kg/m².    Physical Exam  Constitutional:       Appearance: Normal appearance.   HENT:      Head: Normocephalic and atraumatic.      Mouth/Throat:      Mouth: Mucous membranes are moist.      Pharynx: Oropharynx is clear.   Eyes:      Pupils: Pupils are equal, round, and reactive to light.   Cardiovascular:      Rate and Rhythm: Normal rate and regular rhythm.      Heart sounds: Normal heart sounds.   Pulmonary:      Effort: Pulmonary effort is normal.      Breath sounds: Normal breath sounds.   Abdominal:      General: Abdomen is flat. Bowel sounds are normal.      Palpations: Abdomen is soft.   Musculoskeletal:      Cervical back: Normal range of motion.   Neurological:      Mental Status: He is alert.         Antibiotics  LORazepam (Ativan) injection  - Omnicell Override Pull  LORazepam (Ativan) injection 2 mg  levETIRAcetam in NaCl (iso-os) (Keppra) IV 1,000 mg  iohexol (OMNIPaque) 350 mg iodine/mL solution 85 mL  labetaloL (Normodyne,Trandate) injection 5 mg  labetaloL (Normodyne,Trandate) injection 5 mg  labetaloL (Normodyne,Trandate) injection 10 mg  aspirin chewable tablet 81 mg  atorvastatin (Lipitor) tablet 80 mg  lisinopril tablet 40 mg  metoprolol succinate XL (Toprol-XL) 24 hr tablet 50 mg  oxygen (O2) therapy  perflutren lipid microspheres (Definity) injection 3 mL of dilution  labetaloL (Normodyne,Trandate) injection 10 mg  hydrALAZINE (Apresoline) injection 10 mg  hydrALAZINE (Apresoline) tablet 25  mg  polyethylene glycol (Glycolax, Miralax) packet 17 g  enoxaparin (Lovenox) syringe 40 mg  atorvastatin (Lipitor) tablet 80 mg  aspirin EC tablet 81 mg  acetaminophen (Tylenol) tablet 650 mg  dexAMETHasone (Decadron) tablet 6 mg  dexAMETHasone oral liquid 6 mg  dexAMETHasone (PF) (Decadron) injection 6 mg  polyethylene glycol (Glycolax, Miralax) packet 17 g  guaiFENesin (Robitussin) 100 mg/5 mL syrup 200 mg  aspirin suppository 300 mg  oxygen (O2) therapy  azithromycin (Zithromax) in dextrose 5 % in water (D5W) 250 mL  mg  cefTRIAXone (Rocephin) 2 g IV in dextrose 5% 50 mL  remdesivir (Veklury) 200 mg in sodium chloride 0.9% 250 mL IV  remdesivir (Veklury) 100 mg in sodium chloride 0.9% 250 mL IV  LORazepam (Ativan) injection 2 mg      Relevant Results  Labs  Results from last 72 hours   Lab Units 12/15/23  0607   WBC AUTO x10*3/uL 13.0*   HEMOGLOBIN g/dL 14.6   HEMATOCRIT % 45.2   PLATELETS AUTO x10*3/uL 318       Results from last 72 hours   Lab Units 12/15/23  0607   SODIUM mmol/L 136   POTASSIUM mmol/L 4.0   CHLORIDE mmol/L 104   CO2 mmol/L 23   BUN mg/dL 29*   CREATININE mg/dL 0.95   GLUCOSE mg/dL 106*   CALCIUM mg/dL 8.2*   ANION GAP mmol/L 13   EGFR mL/min/1.73m*2 >90   PHOSPHORUS mg/dL 3.9       Results from last 72 hours   Lab Units 12/15/23  0607   ALBUMIN g/dL 3.5       Estimated Creatinine Clearance: 115.2 mL/min (by C-G formula based on SCr of 0.95 mg/dL).  C-Reactive Protein   Date Value Ref Range Status   12/12/2023 0.22 <1.00 mg/dL Final   12/09/2023 2.89 (H) <1.00 mg/dL Final     Microbiology  Reviewed  Imaging  reviewed        Assessment/Plan   Respiratory failure / pneumonia / COVID19 infection, procalcitonin 0.13, completed Remdesivir   Encephalopathy, likely metabolic  Bacteremia, CNS / psychrobacter, likely a contaminant      Recommendations :  Supportive care  Discussed with the medical team     I spent minutes in the professional and overall care of this patient.      Nazia Sibley,  MD

## 2023-12-18 LAB
ANION GAP SERPL CALC-SCNC: 12 MMOL/L (ref 10–20)
BASOPHILS # BLD AUTO: 0.03 X10*3/UL (ref 0–0.1)
BASOPHILS NFR BLD AUTO: 0.2 %
BUN SERPL-MCNC: 41 MG/DL (ref 6–23)
CALCIUM SERPL-MCNC: 8.3 MG/DL (ref 8.6–10.3)
CHLORIDE SERPL-SCNC: 103 MMOL/L (ref 98–107)
CO2 SERPL-SCNC: 25 MMOL/L (ref 21–32)
CREAT SERPL-MCNC: 1.44 MG/DL (ref 0.5–1.3)
EOSINOPHIL # BLD AUTO: 0.42 X10*3/UL (ref 0–0.7)
EOSINOPHIL NFR BLD AUTO: 3.1 %
ERYTHROCYTE [DISTWIDTH] IN BLOOD BY AUTOMATED COUNT: 12.9 % (ref 11.5–14.5)
GFR SERPL CREATININE-BSD FRML MDRD: 55 ML/MIN/1.73M*2
GLUCOSE SERPL-MCNC: 98 MG/DL (ref 74–99)
HCT VFR BLD AUTO: 45.4 % (ref 41–52)
HGB BLD-MCNC: 14.4 G/DL (ref 13.5–17.5)
IMM GRANULOCYTES # BLD AUTO: 0.16 X10*3/UL (ref 0–0.7)
IMM GRANULOCYTES NFR BLD AUTO: 1.2 % (ref 0–0.9)
LYMPHOCYTES # BLD AUTO: 2.2 X10*3/UL (ref 1.2–4.8)
LYMPHOCYTES NFR BLD AUTO: 16.2 %
MAGNESIUM SERPL-MCNC: 2.42 MG/DL (ref 1.6–2.4)
MCH RBC QN AUTO: 25.8 PG (ref 26–34)
MCHC RBC AUTO-ENTMCNC: 31.7 G/DL (ref 32–36)
MCV RBC AUTO: 81 FL (ref 80–100)
MONOCYTES # BLD AUTO: 1.13 X10*3/UL (ref 0.1–1)
MONOCYTES NFR BLD AUTO: 8.3 %
NEUTROPHILS # BLD AUTO: 9.6 X10*3/UL (ref 1.2–7.7)
NEUTROPHILS NFR BLD AUTO: 71 %
NRBC BLD-RTO: 0 /100 WBCS (ref 0–0)
PLATELET # BLD AUTO: 313 X10*3/UL (ref 150–450)
POTASSIUM SERPL-SCNC: 4.3 MMOL/L (ref 3.5–5.3)
RBC # BLD AUTO: 5.59 X10*6/UL (ref 4.5–5.9)
SODIUM SERPL-SCNC: 136 MMOL/L (ref 136–145)
WBC # BLD AUTO: 13.5 X10*3/UL (ref 4.4–11.3)

## 2023-12-18 PROCEDURE — 36415 COLL VENOUS BLD VENIPUNCTURE: CPT | Performed by: STUDENT IN AN ORGANIZED HEALTH CARE EDUCATION/TRAINING PROGRAM

## 2023-12-18 PROCEDURE — 2060000001 HC INTERMEDIATE ICU ROOM DAILY

## 2023-12-18 PROCEDURE — 2500000001 HC RX 250 WO HCPCS SELF ADMINISTERED DRUGS (ALT 637 FOR MEDICARE OP): Performed by: STUDENT IN AN ORGANIZED HEALTH CARE EDUCATION/TRAINING PROGRAM

## 2023-12-18 PROCEDURE — 2500000004 HC RX 250 GENERAL PHARMACY W/ HCPCS (ALT 636 FOR OP/ED): Performed by: STUDENT IN AN ORGANIZED HEALTH CARE EDUCATION/TRAINING PROGRAM

## 2023-12-18 PROCEDURE — 99239 HOSP IP/OBS DSCHRG MGMT >30: CPT | Performed by: STUDENT IN AN ORGANIZED HEALTH CARE EDUCATION/TRAINING PROGRAM

## 2023-12-18 PROCEDURE — 96372 THER/PROPH/DIAG INJ SC/IM: CPT | Performed by: INTERNAL MEDICINE

## 2023-12-18 PROCEDURE — 83735 ASSAY OF MAGNESIUM: CPT | Performed by: STUDENT IN AN ORGANIZED HEALTH CARE EDUCATION/TRAINING PROGRAM

## 2023-12-18 PROCEDURE — 2500000001 HC RX 250 WO HCPCS SELF ADMINISTERED DRUGS (ALT 637 FOR MEDICARE OP): Performed by: INTERNAL MEDICINE

## 2023-12-18 PROCEDURE — 80048 BASIC METABOLIC PNL TOTAL CA: CPT | Performed by: STUDENT IN AN ORGANIZED HEALTH CARE EDUCATION/TRAINING PROGRAM

## 2023-12-18 PROCEDURE — 85025 COMPLETE CBC W/AUTO DIFF WBC: CPT | Performed by: STUDENT IN AN ORGANIZED HEALTH CARE EDUCATION/TRAINING PROGRAM

## 2023-12-18 PROCEDURE — 94762 N-INVAS EAR/PLS OXIMTRY CONT: CPT

## 2023-12-18 PROCEDURE — 2500000004 HC RX 250 GENERAL PHARMACY W/ HCPCS (ALT 636 FOR OP/ED): Performed by: INTERNAL MEDICINE

## 2023-12-18 RX ORDER — DICLOFENAC SODIUM 10 MG/G
4 GEL TOPICAL 4 TIMES DAILY PRN
Status: DISCONTINUED | OUTPATIENT
Start: 2023-12-18 | End: 2023-12-19 | Stop reason: HOSPADM

## 2023-12-18 RX ORDER — DICLOFENAC SODIUM 10 MG/G
4 GEL TOPICAL 4 TIMES DAILY PRN
Start: 2023-12-18

## 2023-12-18 RX ORDER — TAMSULOSIN HYDROCHLORIDE 0.4 MG/1
0.4 CAPSULE ORAL DAILY
Status: DISCONTINUED | OUTPATIENT
Start: 2023-12-18 | End: 2023-12-18

## 2023-12-18 RX ORDER — LEVETIRACETAM 750 MG/1
750 TABLET ORAL 2 TIMES DAILY
Start: 2023-12-18 | End: 2024-02-08 | Stop reason: SDUPTHER

## 2023-12-18 RX ORDER — TAMSULOSIN HYDROCHLORIDE 0.4 MG/1
0.4 CAPSULE ORAL DAILY
Start: 2023-12-19 | End: 2023-12-18 | Stop reason: HOSPADM

## 2023-12-18 RX ADMIN — ENOXAPARIN SODIUM 40 MG: 40 INJECTION SUBCUTANEOUS at 08:27

## 2023-12-18 RX ADMIN — DICLOFENAC SODIUM 1 APPLICATION: 10 GEL TOPICAL at 14:50

## 2023-12-18 RX ADMIN — LISINOPRIL 40 MG: 20 TABLET ORAL at 08:26

## 2023-12-18 RX ADMIN — LEVETIRACETAM 750 MG: 500 TABLET, FILM COATED ORAL at 08:26

## 2023-12-18 RX ADMIN — SPIRONOLACTONE 25 MG: 25 TABLET ORAL at 08:27

## 2023-12-18 RX ADMIN — LEVETIRACETAM 750 MG: 500 TABLET, FILM COATED ORAL at 22:04

## 2023-12-18 RX ADMIN — SODIUM CHLORIDE, POTASSIUM CHLORIDE, SODIUM LACTATE AND CALCIUM CHLORIDE 500 ML: 600; 310; 30; 20 INJECTION, SOLUTION INTRAVENOUS at 10:06

## 2023-12-18 RX ADMIN — METOPROLOL SUCCINATE 50 MG: 50 TABLET, EXTENDED RELEASE ORAL at 08:26

## 2023-12-18 RX ADMIN — ASPIRIN 81 MG: 81 TABLET, COATED ORAL at 08:26

## 2023-12-18 RX ADMIN — ACETAMINOPHEN 650 MG: 325 TABLET ORAL at 08:39

## 2023-12-18 RX ADMIN — ATORVASTATIN CALCIUM 80 MG: 80 TABLET, FILM COATED ORAL at 22:03

## 2023-12-18 ASSESSMENT — COGNITIVE AND FUNCTIONAL STATUS - GENERAL
DAILY ACTIVITIY SCORE: 24
MOBILITY SCORE: 23
CLIMB 3 TO 5 STEPS WITH RAILING: A LITTLE

## 2023-12-18 ASSESSMENT — PAIN SCALES - GENERAL
PAINLEVEL_OUTOF10: 3
PAINLEVEL_OUTOF10: 6
PAINLEVEL_OUTOF10: 0 - NO PAIN

## 2023-12-18 ASSESSMENT — PAIN DESCRIPTION - ORIENTATION: ORIENTATION: LEFT

## 2023-12-18 ASSESSMENT — PAIN SCALES - WONG BAKER: WONGBAKER_NUMERICALRESPONSE: HURTS WHOLE LOT

## 2023-12-18 ASSESSMENT — PAIN - FUNCTIONAL ASSESSMENT: PAIN_FUNCTIONAL_ASSESSMENT: 0-10

## 2023-12-18 ASSESSMENT — PAIN DESCRIPTION - LOCATION: LOCATION: KNEE

## 2023-12-18 NOTE — CONSULTS
"   Nutrition Assessment    Reason for Assessment: Length of stay    Patient is a 63 y.o. male presenting with: Altered mental status, unspecified altered mental status type,   No past medical history on file.   No past surgical history on file.   Nutrition History:  Food and Nutrient History: Unable to interview pt on this date, in isolation 2/2 COVID-19.  RN reports pt has a good appetite, eating 100% of meals.  Discharge planning in progress.  Energy Intake: Good > 75 %  No Known Allergies   Anthropometrics:  Height: 188 cm (6' 2\")   Weight: 126 kg (276 lb 14.4 oz)   BMI (Calculated): 35.54             Weight History:   Wt Readings from Last 10 Encounters:   12/08/23 126 kg (276 lb 14.4 oz)   10/25/23 126 kg (278 lb)   04/19/23 109 kg (241 lb)   04/05/23 103 kg (227 lb 4.6 oz)   03/07/23 106 kg (233 lb)   03/01/23 107 kg (236 lb)   02/17/23 107 kg (236 lb)         Nutrition Significant Labs:  CBC Trend:   Results from last 7 days   Lab Units 12/18/23  0659 12/15/23  0607 12/12/23  0616   WBC AUTO x10*3/uL 13.5* 13.0* 10.4   RBC AUTO x10*6/uL 5.59 5.60 5.50   HEMOGLOBIN g/dL 14.4 14.6 15.0   HEMATOCRIT % 45.4 45.2 44.5   MCV fL 81 81 81   PLATELETS AUTO x10*3/uL 313 318 339    , BMP Trend:   Results from last 7 days   Lab Units 12/18/23  0659 12/15/23  0607 12/13/23  0556 12/12/23  0616   GLUCOSE mg/dL 98 106* 112* 107*   CALCIUM mg/dL 8.3* 8.2* 8.2* 8.2*   SODIUM mmol/L 136 136 138 139   POTASSIUM mmol/L 4.3 4.0 4.0 4.0   CO2 mmol/L 25 23 26 27   CHLORIDE mmol/L 103 104 104 103   BUN mg/dL 41* 29* 31* 30*   CREATININE mg/dL 1.44* 0.95 1.00 0.96    , A1C:  Lab Results   Component Value Date    HGBA1C 6.3 (H) 12/09/2023   , BG POCT trend:   Results from last 7 days   Lab Units 12/12/23  0727 12/11/23 2003 12/11/23  1608   POCT GLUCOSE mg/dL 103* 158* 159*    , Liver Function Trend:   Results from last 7 days   Lab Units 12/12/23  0616   ALK PHOS U/L 44   AST U/L 12   ALT U/L 16   BILIRUBIN TOTAL mg/dL 0.6    , " "Folate: No results found for: \"FOLATE\"     Nutrition Specific Medications:    Current Facility-Administered Medications:     acetaminophen (Tylenol) tablet 650 mg, 650 mg, oral, q4h PRN, Bi Elliott MD, 650 mg at 23 0839    aspirin EC tablet 81 mg, 81 mg, oral, Daily, Bi Elliott MD, 81 mg at 23 0826    aspirin suppository 300 mg, 300 mg, rectal, Once, Bi Elliott MD    atorvastatin (Lipitor) tablet 80 mg, 80 mg, oral, Nightly, Bi Elliott MD, 80 mg at 23    diclofenac sodium (Voltaren) 1 % gel 1 Application, 4 g, Topical, 4x daily PRN, Piter Hannon MD    enoxaparin (Lovenox) syringe 40 mg, 40 mg, subcutaneous, q24h, Bi Elliott MD, 40 mg at 23    guaiFENesin (Robitussin) 100 mg/5 mL syrup 200 mg, 200 mg, oral, q4h PRN, Bi Elliott MD    [] hydrALAZINE (Apresoline) injection 10 mg, 10 mg, intravenous, q20 min PRN **FOLLOWED BY** hydrALAZINE (Apresoline) tablet 25 mg, 25 mg, oral, q6h PRN, Bi Elliott MD    lactated Ringer's bolus 500 mL, 500 mL, intravenous, Once, Piter Hannon MD, Last Rate: 100 mL/hr at 23 1150, Rate Verify at 23 1150    levETIRAcetam (Keppra) tablet 750 mg, 750 mg, oral, BID, Lorie Lin MD, 750 mg at 23 0826    [Held by provider] lisinopril tablet 40 mg, 40 mg, oral, Daily, Bi Elliott MD, 40 mg at 23 08    metoprolol succinate XL (Toprol-XL) 24 hr tablet 50 mg, 50 mg, oral, Daily, Bi Elliott MD, 50 mg at 23 08    oxygen (O2) therapy, , inhalation, Continuous PRN - O2/gases, Alba P Zenon, DO, 3 L/min at 23 0842    polyethylene glycol (Glycolax, Miralax) packet 17 g, 17 g, oral, Daily, Bi Elliott MD, 17 g at 23 0856    polyethylene glycol (Glycolax, Miralax) packet 17 g, 17 g, oral, Daily PRN, Bi Elliott MD    sodium chloride ([place]) 0.65 % nasal spray 1 spray, 1 spray, Each Nostril, 4x daily PRN, Piter Hannon, " MD    [Held by provider] spironolactone (Aldactone) tablet 25 mg, 25 mg, oral, Daily, Piter Hannon MD, 25 mg at 12/18/23 0827    tamsulosin (Flomax) 24 hr capsule 0.4 mg, 0.4 mg, oral, Daily, Piter Hannon MD   Nursing Data Per flowsheet:      Gastrointestinal  Gastrointestinal (WDL): Within Defined Limits    Pain Score: 3  Saunders-Baker FACES Pain Rating: Hurts whole lot   Dietary Orders (From admission, onward)       Start     Ordered    12/17/23 1241  Adult diet Regular  Diet effective now        Question:  Diet type  Answer:  Regular    12/17/23 1240    12/08/23 2252  May Not Participate in Room Service  Once        Question:  .  Answer:  Yes    12/08/23 2252                   Coordination of Care: Pablo ZAMORANO  Nutrition Education:   Education Documentation  No documentation found.         Recommendations:     Nutrition intervention not indicated at this time; re-consult if there is a change in patient status    Time Spent (min): 30 minutes

## 2023-12-18 NOTE — CARE PLAN
The patient's goals for the shift include  remain comfortable    The clinical goals for the shift include patient will remain HDS    Over the shift, the patient did not make progress toward the following goals. Barriers to progression include none. Recommendations to address these barriers include continue with plan of care.

## 2023-12-18 NOTE — NURSING NOTE
Brought in oxygen tubing for patient to wear at night after desatting the previous night. Patient refused to wear it stating it was uncomfortable. Patient educated.

## 2023-12-18 NOTE — CARE PLAN
The patient's goals for the shift include      The clinical goals for the shift include Pt. will void in urinal to obtain accurate i/o    Over the shift, the patient is making progress.
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
## 2023-12-18 NOTE — PROGRESS NOTES
Occupational Therapy                 Therapy Communication Note    Patient Name: Jg Starr  MRN: [id number]  Today's Date: 12/18/2023     Discipline: Occupational Therapy    Missed Visit Reason:  (Spoke with pt's RN in collaboration with PT, with both in agreement that Pt is back to baseline level for menatation and selfcare/related mobility at this time. RN agrees pt doesn't require further OT services, and in agreement with cancelling OT orders.)    Missed Time: Attempt    Comment: Discussed need for pt discharge from OT services with evaluating OTR per findings.

## 2023-12-18 NOTE — PROGRESS NOTES
Jg Starr is a 63 y.o. male on day 10 of admission presenting with Altered mental status, unspecified altered mental status type.    Subjective   Interval History: no fever, no new complaints, the hx is not reliable         Review of Systems    Objective   Range of Vitals (last 24 hours)  Heart Rate:  [76-84]   Temp:  [36 °C (96.8 °F)-37.7 °C (99.9 °F)]   Resp:  [17-20]   BP: ()/(64-83)   SpO2:  [90 %-93 %]   Daily Weight  12/08/23 : 126 kg (276 lb 14.4 oz)    Body mass index is 35.55 kg/m².    Physical Exam  Constitutional:       Appearance: Normal appearance.   HENT:      Head: Normocephalic and atraumatic.      Mouth/Throat:      Mouth: Mucous membranes are moist.      Pharynx: Oropharynx is clear.   Eyes:      Pupils: Pupils are equal, round, and reactive to light.   Cardiovascular:      Rate and Rhythm: Normal rate and regular rhythm.      Heart sounds: Normal heart sounds.   Pulmonary:      Effort: Pulmonary effort is normal.      Breath sounds: Normal breath sounds.   Abdominal:      General: Abdomen is flat. Bowel sounds are normal.      Palpations: Abdomen is soft.   Musculoskeletal:      Cervical back: Normal range of motion.   Neurological:      Mental Status: He is alert.         Antibiotics  LORazepam (Ativan) injection  - Omnicell Override Pull  LORazepam (Ativan) injection 2 mg  levETIRAcetam in NaCl (iso-os) (Keppra) IV 1,000 mg  iohexol (OMNIPaque) 350 mg iodine/mL solution 85 mL  labetaloL (Normodyne,Trandate) injection 5 mg  labetaloL (Normodyne,Trandate) injection 5 mg  labetaloL (Normodyne,Trandate) injection 10 mg  aspirin chewable tablet 81 mg  atorvastatin (Lipitor) tablet 80 mg  lisinopril tablet 40 mg  metoprolol succinate XL (Toprol-XL) 24 hr tablet 50 mg  oxygen (O2) therapy  perflutren lipid microspheres (Definity) injection 3 mL of dilution  labetaloL (Normodyne,Trandate) injection 10 mg  hydrALAZINE (Apresoline) injection 10 mg  hydrALAZINE (Apresoline) tablet 25  mg  polyethylene glycol (Glycolax, Miralax) packet 17 g  enoxaparin (Lovenox) syringe 40 mg  atorvastatin (Lipitor) tablet 80 mg  aspirin EC tablet 81 mg  acetaminophen (Tylenol) tablet 650 mg  dexAMETHasone (Decadron) tablet 6 mg  dexAMETHasone oral liquid 6 mg  dexAMETHasone (PF) (Decadron) injection 6 mg  polyethylene glycol (Glycolax, Miralax) packet 17 g  guaiFENesin (Robitussin) 100 mg/5 mL syrup 200 mg  aspirin suppository 300 mg  oxygen (O2) therapy  azithromycin (Zithromax) in dextrose 5 % in water (D5W) 250 mL  mg  cefTRIAXone (Rocephin) 2 g IV in dextrose 5% 50 mL  remdesivir (Veklury) 200 mg in sodium chloride 0.9% 250 mL IV  remdesivir (Veklury) 100 mg in sodium chloride 0.9% 250 mL IV  LORazepam (Ativan) injection 2 mg      Relevant Results  Labs  Results from last 72 hours   Lab Units 12/18/23  0659   WBC AUTO x10*3/uL 13.5*   HEMOGLOBIN g/dL 14.4   HEMATOCRIT % 45.4   PLATELETS AUTO x10*3/uL 313   NEUTROS PCT AUTO % 71.0   LYMPHS PCT AUTO % 16.2   MONOS PCT AUTO % 8.3   EOS PCT AUTO % 3.1       Results from last 72 hours   Lab Units 12/18/23  0659   SODIUM mmol/L 136   POTASSIUM mmol/L 4.3   CHLORIDE mmol/L 103   CO2 mmol/L 25   BUN mg/dL 41*   CREATININE mg/dL 1.44*   GLUCOSE mg/dL 98   CALCIUM mg/dL 8.3*   ANION GAP mmol/L 12   EGFR mL/min/1.73m*2 55*             Estimated Creatinine Clearance: 76 mL/min (A) (by C-G formula based on SCr of 1.44 mg/dL (H)).  C-Reactive Protein   Date Value Ref Range Status   12/12/2023 0.22 <1.00 mg/dL Final   12/09/2023 2.89 (H) <1.00 mg/dL Final     Microbiology  Reviewed  Imaging  reviewed        Assessment/Plan   Respiratory failure / pneumonia / COVID19 infection, procalcitonin 0.13, completed Remdesivir   Encephalopathy, likely metabolic  Bacteremia, CNS / psychrobacter, likely a contaminant      Recommendations :  Supportive care  Discussed with the medical team     I spent minutes in the professional and overall care of this patient.      Nazia Sibley,  MD

## 2023-12-18 NOTE — PROGRESS NOTES
Physical Therapy                 Therapy Communication Note    Patient Name: Jg Starr  MRN: [id number]  Today's Date: 12/18/2023     Discipline: Physical Therapy    Missed Visit Reason: Missed Visit Reason:  (Spoke with Pt's RN, who is in agreement that Pt is back to baseline level and he is independent in his room. RN agrees that Pt doesn't require further PT services and is in agreement with PT cancelling consult orders. PT will cancel.)    Missed Time: Cancel    Comment:

## 2023-12-19 ENCOUNTER — NURSING HOME VISIT (OUTPATIENT)
Dept: POST ACUTE CARE | Facility: EXTERNAL LOCATION | Age: 63
End: 2023-12-19
Payer: MEDICAID

## 2023-12-19 VITALS
TEMPERATURE: 97.2 F | HEART RATE: 79 BPM | OXYGEN SATURATION: 96 % | WEIGHT: 276.9 LBS | BODY MASS INDEX: 35.54 KG/M2 | DIASTOLIC BLOOD PRESSURE: 76 MMHG | RESPIRATION RATE: 20 BRPM | HEIGHT: 74 IN | SYSTOLIC BLOOD PRESSURE: 114 MMHG

## 2023-12-19 VITALS
DIASTOLIC BLOOD PRESSURE: 76 MMHG | SYSTOLIC BLOOD PRESSURE: 114 MMHG | TEMPERATURE: 97.2 F | OXYGEN SATURATION: 96 % | HEART RATE: 79 BPM

## 2023-12-19 DIAGNOSIS — Z98.61 CAD S/P PERCUTANEOUS CORONARY ANGIOPLASTY: ICD-10-CM

## 2023-12-19 DIAGNOSIS — I10 BENIGN ESSENTIAL HTN: ICD-10-CM

## 2023-12-19 DIAGNOSIS — I67.1 ANEURYSM OF MIDDLE CEREBRAL ARTERY (HHS-HCC): Primary | ICD-10-CM

## 2023-12-19 DIAGNOSIS — I25.5 ISCHEMIC CARDIOMYOPATHY: ICD-10-CM

## 2023-12-19 DIAGNOSIS — I25.10 CAD S/P PERCUTANEOUS CORONARY ANGIOPLASTY: ICD-10-CM

## 2023-12-19 DIAGNOSIS — I63.512 LEFT MIDDLE CEREBRAL ARTERY STROKE (MULTI): ICD-10-CM

## 2023-12-19 DIAGNOSIS — M10.9 ACUTE GOUT OF LEFT KNEE, UNSPECIFIED CAUSE: ICD-10-CM

## 2023-12-19 LAB
ANION GAP SERPL CALC-SCNC: 14 MMOL/L (ref 10–20)
BASOPHILS # BLD AUTO: 0.04 X10*3/UL (ref 0–0.1)
BASOPHILS NFR BLD AUTO: 0.3 %
BUN SERPL-MCNC: 25 MG/DL (ref 6–23)
CALCIUM SERPL-MCNC: 8.3 MG/DL (ref 8.6–10.3)
CHLORIDE SERPL-SCNC: 104 MMOL/L (ref 98–107)
CO2 SERPL-SCNC: 22 MMOL/L (ref 21–32)
CREAT SERPL-MCNC: 0.95 MG/DL (ref 0.5–1.3)
EOSINOPHIL # BLD AUTO: 0.37 X10*3/UL (ref 0–0.7)
EOSINOPHIL NFR BLD AUTO: 3.2 %
ERYTHROCYTE [DISTWIDTH] IN BLOOD BY AUTOMATED COUNT: 12.8 % (ref 11.5–14.5)
GFR SERPL CREATININE-BSD FRML MDRD: >90 ML/MIN/1.73M*2
GLUCOSE SERPL-MCNC: 118 MG/DL (ref 74–99)
HCT VFR BLD AUTO: 43 % (ref 41–52)
HGB BLD-MCNC: 14 G/DL (ref 13.5–17.5)
IMM GRANULOCYTES # BLD AUTO: 0.09 X10*3/UL (ref 0–0.7)
IMM GRANULOCYTES NFR BLD AUTO: 0.8 % (ref 0–0.9)
LYMPHOCYTES # BLD AUTO: 1.96 X10*3/UL (ref 1.2–4.8)
LYMPHOCYTES NFR BLD AUTO: 16.7 %
MAGNESIUM SERPL-MCNC: 2.22 MG/DL (ref 1.6–2.4)
MCH RBC QN AUTO: 26.5 PG (ref 26–34)
MCHC RBC AUTO-ENTMCNC: 32.6 G/DL (ref 32–36)
MCV RBC AUTO: 81 FL (ref 80–100)
MONOCYTES # BLD AUTO: 1.11 X10*3/UL (ref 0.1–1)
MONOCYTES NFR BLD AUTO: 9.5 %
NEUTROPHILS # BLD AUTO: 8.15 X10*3/UL (ref 1.2–7.7)
NEUTROPHILS NFR BLD AUTO: 69.5 %
NRBC BLD-RTO: 0 /100 WBCS (ref 0–0)
PLATELET # BLD AUTO: 276 X10*3/UL (ref 150–450)
POTASSIUM SERPL-SCNC: 4.2 MMOL/L (ref 3.5–5.3)
RBC # BLD AUTO: 5.29 X10*6/UL (ref 4.5–5.9)
SODIUM SERPL-SCNC: 136 MMOL/L (ref 136–145)
WBC # BLD AUTO: 11.7 X10*3/UL (ref 4.4–11.3)

## 2023-12-19 PROCEDURE — 36415 COLL VENOUS BLD VENIPUNCTURE: CPT | Performed by: STUDENT IN AN ORGANIZED HEALTH CARE EDUCATION/TRAINING PROGRAM

## 2023-12-19 PROCEDURE — 96372 THER/PROPH/DIAG INJ SC/IM: CPT | Performed by: INTERNAL MEDICINE

## 2023-12-19 PROCEDURE — 2500000004 HC RX 250 GENERAL PHARMACY W/ HCPCS (ALT 636 FOR OP/ED): Performed by: INTERNAL MEDICINE

## 2023-12-19 PROCEDURE — 80048 BASIC METABOLIC PNL TOTAL CA: CPT | Performed by: STUDENT IN AN ORGANIZED HEALTH CARE EDUCATION/TRAINING PROGRAM

## 2023-12-19 PROCEDURE — 83735 ASSAY OF MAGNESIUM: CPT | Performed by: STUDENT IN AN ORGANIZED HEALTH CARE EDUCATION/TRAINING PROGRAM

## 2023-12-19 PROCEDURE — 85025 COMPLETE CBC W/AUTO DIFF WBC: CPT | Performed by: STUDENT IN AN ORGANIZED HEALTH CARE EDUCATION/TRAINING PROGRAM

## 2023-12-19 PROCEDURE — 2500000001 HC RX 250 WO HCPCS SELF ADMINISTERED DRUGS (ALT 637 FOR MEDICARE OP): Performed by: INTERNAL MEDICINE

## 2023-12-19 PROCEDURE — 99310 SBSQ NF CARE HIGH MDM 45: CPT | Performed by: NURSE PRACTITIONER

## 2023-12-19 PROCEDURE — 94762 N-INVAS EAR/PLS OXIMTRY CONT: CPT

## 2023-12-19 PROCEDURE — 99238 HOSP IP/OBS DSCHRG MGMT 30/<: CPT | Performed by: FAMILY MEDICINE

## 2023-12-19 RX ADMIN — ASPIRIN 81 MG: 81 TABLET, COATED ORAL at 09:24

## 2023-12-19 RX ADMIN — METOPROLOL SUCCINATE 50 MG: 50 TABLET, EXTENDED RELEASE ORAL at 09:24

## 2023-12-19 RX ADMIN — ENOXAPARIN SODIUM 40 MG: 40 INJECTION SUBCUTANEOUS at 09:24

## 2023-12-19 RX ADMIN — LEVETIRACETAM 750 MG: 500 TABLET, FILM COATED ORAL at 09:24

## 2023-12-19 ASSESSMENT — ENCOUNTER SYMPTOMS
FATIGUE: 0
SHORTNESS OF BREATH: 0
WEAKNESS: 0
DIFFICULTY URINATING: 0
DIZZINESS: 0
DIARRHEA: 0
NAUSEA: 0
TROUBLE SWALLOWING: 0
WHEEZING: 0
SEIZURES: 1
FEVER: 0
CONSTIPATION: 0
BRUISES/BLEEDS EASILY: 0
COLOR CHANGE: 0
PALPITATIONS: 0
WOUND: 0
ABDOMINAL DISTENTION: 0
CHILLS: 0
ARTHRALGIAS: 1
JOINT SWELLING: 1
EYE PAIN: 0
HEADACHES: 0
ADENOPATHY: 0
MYALGIAS: 0
ABDOMINAL PAIN: 0
COUGH: 0

## 2023-12-19 ASSESSMENT — COGNITIVE AND FUNCTIONAL STATUS - GENERAL
CLIMB 3 TO 5 STEPS WITH RAILING: A LITTLE
DAILY ACTIVITIY SCORE: 24
MOBILITY SCORE: 23

## 2023-12-19 ASSESSMENT — PAIN SCALES - GENERAL
PAINLEVEL_OUTOF10: 2
PAINLEVEL: 3

## 2023-12-19 ASSESSMENT — PAIN SCALES - WONG BAKER: WONGBAKER_NUMERICALRESPONSE: HURTS LITTLE MORE

## 2023-12-19 ASSESSMENT — PAIN - FUNCTIONAL ASSESSMENT: PAIN_FUNCTIONAL_ASSESSMENT: 0-10

## 2023-12-19 NOTE — NURSING NOTE
Discharge orders received, report called to Nurse [place] at [facility]. Pt belonging packed, no tele or IV to remove. Preparing to transport via wheelchair with [facility].

## 2023-12-19 NOTE — CARE PLAN
The patient's goals for the shift include      The clinical goals for the shift include Pt will remain HDS and discharge this shift.    Preparing for dc.

## 2023-12-19 NOTE — ASSESSMENT & PLAN NOTE
Patient now on anti-seizure medications. He is to maintain routine OP follow-up with neurology. Seizure precautions in place. OT/PT eval and tx, Son currently has emergency gaurdianship

## 2023-12-19 NOTE — CARE PLAN
The clinical goals for the shift include Patient will remain HDS throughout shift.    Problem: Pain  Goal: My pain/discomfort is manageable  Outcome: Progressing     Problem: Safety  Goal: Patient will be injury free during hospitalization  Outcome: Progressing  Goal: I will remain free of falls  Outcome: Progressing     Problem: Daily Care  Goal: Daily care needs are met  Outcome: Progressing     Problem: Psychosocial Needs  Goal: Collaborate with me, my family, and caregiver to identify my specific goals  Outcome: Progressing      Patient had an uneventful night. Patient rested comfortably throughout the night. Plan to be discharged today.

## 2023-12-19 NOTE — ASSESSMENT & PLAN NOTE
Patient to continue current medication regiment. Staff to monitor weights and VS routinely. Provider to be notified any of new cardiac concerns
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
## 2023-12-19 NOTE — PROGRESS NOTES
Jg Starr is a 63 y.o. male on day 11 of admission presenting with Altered mental status, unspecified altered mental status type.    Subjective   Interval History: no fever, no new complaints, the hx is not reliable         Review of Systems    Objective   Range of Vitals (last 24 hours)  Heart Rate:  [76-83]   Temp:  [36.2 °C (97.2 °F)-36.7 °C (98 °F)]   Resp:  [18-20]   BP: (112-117)/(71-76)   SpO2:  [91 %-96 %]   Daily Weight  12/08/23 : 126 kg (276 lb 14.4 oz)    Body mass index is 35.55 kg/m².    Physical Exam  Constitutional:       Appearance: Normal appearance.   HENT:      Head: Normocephalic and atraumatic.      Mouth/Throat:      Mouth: Mucous membranes are moist.      Pharynx: Oropharynx is clear.   Eyes:      Pupils: Pupils are equal, round, and reactive to light.   Cardiovascular:      Rate and Rhythm: Normal rate and regular rhythm.      Heart sounds: Normal heart sounds.   Pulmonary:      Effort: Pulmonary effort is normal.      Breath sounds: Normal breath sounds.   Abdominal:      General: Abdomen is flat. Bowel sounds are normal.      Palpations: Abdomen is soft.   Musculoskeletal:      Cervical back: Normal range of motion.   Neurological:      Mental Status: He is alert.         Antibiotics  LORazepam (Ativan) injection  - Omnicell Override Pull  LORazepam (Ativan) injection 2 mg  levETIRAcetam in NaCl (iso-os) (Keppra) IV 1,000 mg  iohexol (OMNIPaque) 350 mg iodine/mL solution 85 mL  labetaloL (Normodyne,Trandate) injection 5 mg  labetaloL (Normodyne,Trandate) injection 5 mg  labetaloL (Normodyne,Trandate) injection 10 mg  aspirin chewable tablet 81 mg  atorvastatin (Lipitor) tablet 80 mg  lisinopril tablet 40 mg  metoprolol succinate XL (Toprol-XL) 24 hr tablet 50 mg  oxygen (O2) therapy  perflutren lipid microspheres (Definity) injection 3 mL of dilution  labetaloL (Normodyne,Trandate) injection 10 mg  hydrALAZINE (Apresoline) injection 10 mg  hydrALAZINE (Apresoline) tablet 25  mg  polyethylene glycol (Glycolax, Miralax) packet 17 g  enoxaparin (Lovenox) syringe 40 mg  atorvastatin (Lipitor) tablet 80 mg  aspirin EC tablet 81 mg  acetaminophen (Tylenol) tablet 650 mg  dexAMETHasone (Decadron) tablet 6 mg  dexAMETHasone oral liquid 6 mg  dexAMETHasone (PF) (Decadron) injection 6 mg  polyethylene glycol (Glycolax, Miralax) packet 17 g  guaiFENesin (Robitussin) 100 mg/5 mL syrup 200 mg  aspirin suppository 300 mg  oxygen (O2) therapy  azithromycin (Zithromax) in dextrose 5 % in water (D5W) 250 mL  mg  cefTRIAXone (Rocephin) 2 g IV in dextrose 5% 50 mL  remdesivir (Veklury) 200 mg in sodium chloride 0.9% 250 mL IV  remdesivir (Veklury) 100 mg in sodium chloride 0.9% 250 mL IV  LORazepam (Ativan) injection 2 mg      Relevant Results  Labs  Results from last 72 hours   Lab Units 12/19/23  0525 12/18/23  0659   WBC AUTO x10*3/uL 11.7* 13.5*   HEMOGLOBIN g/dL 14.0 14.4   HEMATOCRIT % 43.0 45.4   PLATELETS AUTO x10*3/uL 276 313   NEUTROS PCT AUTO % 69.5 71.0   LYMPHS PCT AUTO % 16.7 16.2   MONOS PCT AUTO % 9.5 8.3   EOS PCT AUTO % 3.2 3.1       Results from last 72 hours   Lab Units 12/19/23  0525 12/18/23  0659   SODIUM mmol/L 136 136   POTASSIUM mmol/L 4.2 4.3   CHLORIDE mmol/L 104 103   CO2 mmol/L 22 25   BUN mg/dL 25* 41*   CREATININE mg/dL 0.95 1.44*   GLUCOSE mg/dL 118* 98   CALCIUM mg/dL 8.3* 8.3*   ANION GAP mmol/L 14 12   EGFR mL/min/1.73m*2 >90 55*             Estimated Creatinine Clearance: 115.2 mL/min (by C-G formula based on SCr of 0.95 mg/dL).  C-Reactive Protein   Date Value Ref Range Status   12/12/2023 0.22 <1.00 mg/dL Final   12/09/2023 2.89 (H) <1.00 mg/dL Final     Microbiology  Reviewed  Imaging  reviewed        Assessment/Plan   Respiratory failure / pneumonia / COVID19 infection, procalcitonin 0.13, completed Remdesivir   Encephalopathy, likely metabolic  Bacteremia, CNS / psychrobacter, likely a contaminant      Recommendations :  Supportive care  Discussed with the  medical team     I spent minutes in the professional and overall care of this patient.      Nazia Sibley MD
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
## 2023-12-21 ENCOUNTER — NURSING HOME VISIT (OUTPATIENT)
Dept: POST ACUTE CARE | Facility: EXTERNAL LOCATION | Age: 63
End: 2023-12-21
Payer: MEDICAID

## 2023-12-21 DIAGNOSIS — I67.1 ANEURYSM OF MIDDLE CEREBRAL ARTERY (HHS-HCC): ICD-10-CM

## 2023-12-21 DIAGNOSIS — I25.10 CAD S/P PERCUTANEOUS CORONARY ANGIOPLASTY: ICD-10-CM

## 2023-12-21 DIAGNOSIS — Z98.61 CAD S/P PERCUTANEOUS CORONARY ANGIOPLASTY: ICD-10-CM

## 2023-12-21 DIAGNOSIS — M10.9 GOUT OF MULTIPLE SITES, UNSPECIFIED CAUSE, UNSPECIFIED CHRONICITY: Primary | ICD-10-CM

## 2023-12-21 DIAGNOSIS — I10 BENIGN ESSENTIAL HTN: ICD-10-CM

## 2023-12-21 DIAGNOSIS — I63.9 CEREBROVASCULAR ACCIDENT (CVA), UNSPECIFIED MECHANISM (MULTI): ICD-10-CM

## 2023-12-21 DIAGNOSIS — E11.9 CONTROLLED TYPE 2 DIABETES MELLITUS WITHOUT COMPLICATION, WITHOUT LONG-TERM CURRENT USE OF INSULIN (MULTI): ICD-10-CM

## 2023-12-21 PROCEDURE — 99309 SBSQ NF CARE MODERATE MDM 30: CPT | Performed by: NURSE PRACTITIONER

## 2023-12-21 ASSESSMENT — PAIN SCALES - GENERAL: PAINLEVEL: 3

## 2023-12-21 NOTE — LETTER
Patient: Jg Starr  : 1960    Encounter Date: 2023    Subjective  Patient ID: Jg Starr is a 63 y.o. male who presents for Routine Skilled Follow-up to [facility] services    HPI   Patient seen sitting up in bed in no acute distress. Patient's only concern today is continued issues with left knee and right ankle pain secondary to gout. He states that this pain is intermittent and can be triggered by certain foods. Patient has never trialed a medication for gout but is interested in doing so. Patient denies any current issues with appetite, staying hydrated, bowel, bladder or sleep. Patient ambulates independently and denies any recent falls. He continues to work with in house therapy services. Medications and chart reviewed. Discussed with nursing staff.      Current Outpatient Medications:   •  allopurinol (Zyloprim) 100 mg tablet, Take 1 tablet (100 mg) by mouth once daily., Disp: 30 tablet, Rfl:   •  aspirin 81 mg chewable tablet, , Disp: , Rfl:   •  atorvastatin (Lipitor) 80 mg tablet, Take 1 tablet (80 mg) by mouth once daily at bedtime., Disp: 90 tablet, Rfl: 1  •  diclofenac sodium (Voltaren) 1 % gel gel, Apply 1 Application topically 4 times a day as needed (knee pain)., Disp: , Rfl:   •  levETIRAcetam (Keppra) 750 mg tablet, Take 1 tablet (750 mg) by mouth 2 times a day., Disp: , Rfl:   •  lisinopril 40 mg tablet, Take 1 tablet (40 mg) by mouth once daily., Disp: 90 tablet, Rfl: 1  •  metoprolol succinate XL (Toprol-XL) 50 mg 24 hr tablet, Take 1 tablet (50 mg) by mouth once daily., Disp: 90 tablet, Rfl: 1  •  sodium chloride (Ocean) 0.65 % nasal spray, Administer 1 spray into each nostril 4 times a day as needed for congestion., Disp: 30 mL, Rfl: 12     Review of Systems   Constitutional:  Negative for chills, fatigue and fever.   HENT:  Negative for dental problem and trouble swallowing.    Eyes:  Negative for pain and visual disturbance.   Respiratory:   Negative for cough, shortness of breath and wheezing.    Cardiovascular:  Negative for chest pain, palpitations and leg swelling.   Gastrointestinal:  Negative for abdominal distention, abdominal pain, constipation, diarrhea and nausea.   Endocrine: Negative for cold intolerance, heat intolerance and polyuria.   Genitourinary:  Negative for difficulty urinating.   Musculoskeletal:  Positive for arthralgias and joint swelling. Negative for gait problem and myalgias.        Positive for left knee and right ankle gout   Skin:  Negative for color change, pallor, rash and wound.   Allergic/Immunologic: Negative for environmental allergies and food allergies.   Neurological:  Positive for seizures. Negative for dizziness, weakness and headaches.        Positive for memory loss   Hematological:  Negative for adenopathy. Does not bruise/bleed easily.   Psychiatric/Behavioral:  Negative for behavioral problems.    All other systems reviewed and are negative.      Objective  There were no vitals taken for this visit.    Physical Exam  Constitutional:       General: He is not in acute distress.     Appearance: Normal appearance. He is not toxic-appearing.   HENT:      Head: Normocephalic and atraumatic.      Nose: Nose normal.      Mouth/Throat:      Mouth: Mucous membranes are moist.      Pharynx: Oropharynx is clear.   Eyes:      Extraocular Movements: Extraocular movements intact.      Conjunctiva/sclera: Conjunctivae normal.      Pupils: Pupils are equal, round, and reactive to light.   Cardiovascular:      Rate and Rhythm: Normal rate and regular rhythm.      Pulses: Normal pulses.      Heart sounds: Normal heart sounds. No murmur heard.  Pulmonary:      Effort: Pulmonary effort is normal.      Breath sounds: Normal breath sounds. No wheezing.   Abdominal:      General: Abdomen is flat. Bowel sounds are normal.      Palpations: Abdomen is soft.   Musculoskeletal:         General: Swelling present. Normal range of motion.       Cervical back: Neck supple.      Comments: Left knee non-pitting edema   Skin:     General: Skin is warm and dry.      Comments: Lori-area not observed   Neurological:      Mental Status: He is alert. Mental status is at baseline.      Cranial Nerves: No cranial nerve deficit.      Motor: No weakness.      Comments: Oriented x 2-3. Has some memory loss   Psychiatric:         Mood and Affect: Mood normal.         Behavior: Behavior normal.         Assessment/Plan  Problem List Items Addressed This Visit             ICD-10-CM       Cardiac and Vasculature     Patient to continue current medication regiment. Staff to monitor weights and VS routinely. Provider to be notified any of new cardiac concerns         Benign essential HTN I10    CAD S/P percutaneous coronary angioplasty I25.10, Z98.61       Endocrine/Metabolic     Patient currently off of diabetic agents. Most recent A1C was 6.3 earlier this month. Staff may monitor blood glucose levels PRN         Controlled type 2 diabetes mellitus without complication, without long-term current use of insulin (CMS/[facility]) E11.9       Musculoskeletal and Injuries     Will initiate short course of colchicine and then daily allopurinol. Staff to monitor patient's response and notify provider for any worsening or persistent S/S         Gout - Primary M10.9    Relevant Medications    allopurinol (Zyloprim) 100 mg tablet       Neuro     Patient now on anti-seizure medications. He is to maintain routine OP follow-up with neurology. Seizure precautions in place. OT/PT eval and tx, Son currently has emergency gaurdianship         CVA (cerebral vascular accident) (CMS/[facility]) I63.9    Aneurysm of middle cerebral artery I67.1          LEONCIO Wiggins      Electronically Signed By: LEONCIO Wiggins   12/26/23 10:22 PM

## 2023-12-22 ENCOUNTER — NURSING HOME VISIT (OUTPATIENT)
Dept: POST ACUTE CARE | Facility: EXTERNAL LOCATION | Age: 63
End: 2023-12-22
Payer: MEDICAID

## 2023-12-22 DIAGNOSIS — M10.9 ACUTE GOUT OF LEFT KNEE, UNSPECIFIED CAUSE: ICD-10-CM

## 2023-12-22 DIAGNOSIS — I10 BENIGN ESSENTIAL HTN: Primary | ICD-10-CM

## 2023-12-22 DIAGNOSIS — F10.10 ETOH ABUSE: ICD-10-CM

## 2023-12-22 DIAGNOSIS — I25.5 ISCHEMIC CARDIOMYOPATHY: ICD-10-CM

## 2023-12-22 DIAGNOSIS — E11.9 CONTROLLED TYPE 2 DIABETES MELLITUS WITHOUT COMPLICATION, WITHOUT LONG-TERM CURRENT USE OF INSULIN (MULTI): ICD-10-CM

## 2023-12-22 DIAGNOSIS — I63.512 LEFT MIDDLE CEREBRAL ARTERY STROKE (MULTI): ICD-10-CM

## 2023-12-22 DIAGNOSIS — R56.9 SEIZURE (MULTI): ICD-10-CM

## 2023-12-22 PROCEDURE — 99306 1ST NF CARE HIGH MDM 50: CPT | Performed by: FAMILY MEDICINE

## 2023-12-22 NOTE — LETTER
Patient: Jg Starr  : 1960    Encounter Date: 2023    Subjective  Patient ID: Jg Starr is a 63 y.o. male who is acute skilled care being seen and evaluated for multiple medical problems.    HPI     Review of Systems    Objective  There were no vitals taken for this visit.    Physical Exam    Assessment/Plan  Problem List Items Addressed This Visit             ICD-10-CM    Benign essential HTN - Primary I10    Gout M10.9    Ischemic cardiomyopathy I25.5    Left middle cerebral artery stroke (CMS/[facility]) I63.512    Controlled type 2 diabetes mellitus without complication, without long-term current use of insulin (CMS/[facility]) E11.9     Other Visit Diagnoses         Codes    Seizure (CMS/[facility])     R56.9    ETOH abuse     F10.10        This note is being entered for billing purposes only.  For complete documentation please see note that is present at the long-term care facility itself.     Goals    None           Electronically Signed By: Susannah Ortiz MD   23  1:28 PM

## 2023-12-26 ENCOUNTER — NURSING HOME VISIT (OUTPATIENT)
Dept: POST ACUTE CARE | Facility: EXTERNAL LOCATION | Age: 63
End: 2023-12-26
Payer: MEDICAID

## 2023-12-26 DIAGNOSIS — I10 BENIGN ESSENTIAL HTN: ICD-10-CM

## 2023-12-26 DIAGNOSIS — Z98.61 CAD S/P PERCUTANEOUS CORONARY ANGIOPLASTY: ICD-10-CM

## 2023-12-26 DIAGNOSIS — I67.1 ANEURYSM OF MIDDLE CEREBRAL ARTERY (HHS-HCC): Primary | ICD-10-CM

## 2023-12-26 DIAGNOSIS — M10.9 ACUTE GOUT OF LEFT KNEE, UNSPECIFIED CAUSE: ICD-10-CM

## 2023-12-26 DIAGNOSIS — I25.10 CAD S/P PERCUTANEOUS CORONARY ANGIOPLASTY: ICD-10-CM

## 2023-12-26 DIAGNOSIS — I63.9 CEREBROVASCULAR ACCIDENT (CVA), UNSPECIFIED MECHANISM (MULTI): ICD-10-CM

## 2023-12-26 DIAGNOSIS — E11.9 CONTROLLED TYPE 2 DIABETES MELLITUS WITHOUT COMPLICATION, WITHOUT LONG-TERM CURRENT USE OF INSULIN (MULTI): ICD-10-CM

## 2023-12-26 PROCEDURE — 99309 SBSQ NF CARE MODERATE MDM 30: CPT | Performed by: NURSE PRACTITIONER

## 2023-12-26 RX ORDER — ALLOPURINOL 100 MG/1
100 TABLET ORAL DAILY
Qty: 30 TABLET
Start: 2023-12-26 | End: 2024-02-08 | Stop reason: SDUPTHER

## 2023-12-26 ASSESSMENT — ENCOUNTER SYMPTOMS
ADENOPATHY: 0
CHILLS: 0
FEVER: 0
ABDOMINAL PAIN: 0
JOINT SWELLING: 1
DIFFICULTY URINATING: 0
SEIZURES: 1
COLOR CHANGE: 0
EYE PAIN: 0
WEAKNESS: 0
ABDOMINAL DISTENTION: 0
DIZZINESS: 0
BRUISES/BLEEDS EASILY: 0
MYALGIAS: 0
CONSTIPATION: 0
CHILLS: 0
HEADACHES: 0
NAUSEA: 0
EYE PAIN: 0
ABDOMINAL PAIN: 0
ABDOMINAL DISTENTION: 0
COLOR CHANGE: 0
PALPITATIONS: 0
WOUND: 0
MYALGIAS: 0
FATIGUE: 0
ADENOPATHY: 0
SHORTNESS OF BREATH: 0
BRUISES/BLEEDS EASILY: 0
SHORTNESS OF BREATH: 0
CONSTIPATION: 0
PALPITATIONS: 0
HEADACHES: 0
TROUBLE SWALLOWING: 0
WOUND: 0
SEIZURES: 1
WHEEZING: 0
TROUBLE SWALLOWING: 0
DIZZINESS: 0
NAUSEA: 0
DIARRHEA: 0
COUGH: 0
DIARRHEA: 0
WEAKNESS: 0
ARTHRALGIAS: 1
WHEEZING: 0
FEVER: 0
FATIGUE: 0
DIFFICULTY URINATING: 0
COUGH: 0

## 2023-12-26 ASSESSMENT — PAIN SCALES - GENERAL: PAINLEVEL: 0-NO PAIN

## 2023-12-26 NOTE — PROGRESS NOTES
Subjective   Patient ID: Jg Starr is a 63 y.o. male who presents for Routine Skilled Follow-up to [facility]illed services    HPI   Patient seen sitting up in bed in no acute distress. Patient's only concern today is continued issues with left knee and right ankle pain secondary to gout. He states that this pain is intermittent and can be triggered by certain foods. Patient has never trialed a medication for gout but is interested in doing so. Patient denies any current issues with appetite, staying hydrated, bowel, bladder or sleep. Patient ambulates independently and denies any recent falls. He continues to work with in house therapy services. Medications and chart reviewed. Discussed with nursing staff.      Current Outpatient Medications:     allopurinol (Zyloprim) 100 mg tablet, Take 1 tablet (100 mg) by mouth once daily., Disp: 30 tablet, Rfl:     aspirin 81 mg chewable tablet, , Disp: , Rfl:     atorvastatin (Lipitor) 80 mg tablet, Take 1 tablet (80 mg) by mouth once daily at bedtime., Disp: 90 tablet, Rfl: 1    diclofenac sodium (Voltaren) 1 % gel gel, Apply 1 Application topically 4 times a day as needed (knee pain)., Disp: , Rfl:     levETIRAcetam (Keppra) 750 mg tablet, Take 1 tablet (750 mg) by mouth 2 times a day., Disp: , Rfl:     lisinopril 40 mg tablet, Take 1 tablet (40 mg) by mouth once daily., Disp: 90 tablet, Rfl: 1    metoprolol succinate XL (Toprol-XL) 50 mg 24 hr tablet, Take 1 tablet (50 mg) by mouth once daily., Disp: 90 tablet, Rfl: 1    sodium chloride (Ocean) 0.65 % nasal spray, Administer 1 spray into each nostril 4 times a day as needed for congestion., Disp: 30 mL, Rfl: 12     Review of Systems   Constitutional:  Negative for chills, fatigue and fever.   HENT:  Negative for dental problem and trouble swallowing.    Eyes:  Negative for pain and visual disturbance.   Respiratory:  Negative for cough, shortness of breath and wheezing.    Cardiovascular:  Negative  for chest pain, palpitations and leg swelling.   Gastrointestinal:  Negative for abdominal distention, abdominal pain, constipation, diarrhea and nausea.   Endocrine: Negative for cold intolerance, heat intolerance and polyuria.   Genitourinary:  Negative for difficulty urinating.   Musculoskeletal:  Positive for arthralgias and joint swelling. Negative for gait problem and myalgias.        Positive for left knee and right ankle gout   Skin:  Negative for color change, pallor, rash and wound.   Allergic/Immunologic: Negative for environmental allergies and food allergies.   Neurological:  Positive for seizures. Negative for dizziness, weakness and headaches.        Positive for memory loss   Hematological:  Negative for adenopathy. Does not bruise/bleed easily.   Psychiatric/Behavioral:  Negative for behavioral problems.    All other systems reviewed and are negative.      Objective   There were no vitals taken for this visit.    Physical Exam  Constitutional:       General: He is not in acute distress.     Appearance: Normal appearance. He is not toxic-appearing.   HENT:      Head: Normocephalic and atraumatic.      Nose: Nose normal.      Mouth/Throat:      Mouth: Mucous membranes are moist.      Pharynx: Oropharynx is clear.   Eyes:      Extraocular Movements: Extraocular movements intact.      Conjunctiva/sclera: Conjunctivae normal.      Pupils: Pupils are equal, round, and reactive to light.   Cardiovascular:      Rate and Rhythm: Normal rate and regular rhythm.      Pulses: Normal pulses.      Heart sounds: Normal heart sounds. No murmur heard.  Pulmonary:      Effort: Pulmonary effort is normal.      Breath sounds: Normal breath sounds. No wheezing.   Abdominal:      General: Abdomen is flat. Bowel sounds are normal.      Palpations: Abdomen is soft.   Musculoskeletal:         General: Swelling present. Normal range of motion.      Cervical back: Neck supple.      Comments: Left knee non-pitting edema    Skin:     General: Skin is warm and dry.      Comments: Lori-area not observed   Neurological:      Mental Status: He is alert. Mental status is at baseline.      Cranial Nerves: No cranial nerve deficit.      Motor: No weakness.      Comments: Oriented x 2-3. Has some memory loss   Psychiatric:         Mood and Affect: Mood normal.         Behavior: Behavior normal.         Assessment/Plan   Problem List Items Addressed This Visit             ICD-10-CM       Cardiac and Vasculature     Patient to continue current medication regiment. Staff to monitor weights and VS routinely. Provider to be notified any of new cardiac concerns         Benign essential HTN I10    CAD S/P percutaneous coronary angioplasty I25.10, Z98.61       Endocrine/Metabolic     Patient currently off of diabetic agents. Most recent A1C was 6.3 earlier this month. Staff may monitor blood glucose levels PRN         Controlled type 2 diabetes mellitus without complication, without long-term current use of insulin (CMS/[facility]) E11.9       Musculoskeletal and Injuries     Will initiate short course of colchicine and then daily allopurinol. Staff to monitor patient's response and notify provider for any worsening or persistent S/S         Gout - Primary M10.9    Relevant Medications    allopurinol (Zyloprim) 100 mg tablet       Neuro     Patient now on anti-seizure medications. He is to maintain routine OP follow-up with neurology. Seizure precautions in place. OT/PT eval and tx, Son currently has emergency gaurdianship         CVA (cerebral vascular accident) (CMS/[facility]) I63.9    Aneurysm of middle cerebral artery I67.1          Crys Wagner, APRN-CNP

## 2023-12-26 NOTE — LETTER
Patient: Jg Starr  : 1960    Encounter Date: 2023    Subjective  Patient ID: Jg Starr is a 63 y.o. male who presents for Gout Follow-up to [facility] Skilled services    HPI   Patient seen sitting up in bed in no acute distress. He states that associated gout pain has almost completed resolved after completing a course of colchicine. Diet education reinforced. Patient denies any current issues with appetite, staying hydrated, bowel, bladder or sleep. Patient ambulates independently and denies any recent falls. He continues to work with in house therapy services. Medications and chart reviewed. Discussed with nursing staff.      Current Outpatient Medications:   •  allopurinol (Zyloprim) 100 mg tablet, Take 1 tablet (100 mg) by mouth once daily., Disp: 30 tablet, Rfl:   •  aspirin 81 mg chewable tablet, , Disp: , Rfl:   •  atorvastatin (Lipitor) 80 mg tablet, Take 1 tablet (80 mg) by mouth once daily at bedtime., Disp: 90 tablet, Rfl: 1  •  diclofenac sodium (Voltaren) 1 % gel gel, Apply 1 Application topically 4 times a day as needed (knee pain)., Disp: , Rfl:   •  levETIRAcetam (Keppra) 750 mg tablet, Take 1 tablet (750 mg) by mouth 2 times a day., Disp: , Rfl:   •  lisinopril 40 mg tablet, Take 1 tablet (40 mg) by mouth once daily., Disp: 90 tablet, Rfl: 1  •  metoprolol succinate XL (Toprol-XL) 50 mg 24 hr tablet, Take 1 tablet (50 mg) by mouth once daily., Disp: 90 tablet, Rfl: 1  •  sodium chloride (Ocean) 0.65 % nasal spray, Administer 1 spray into each nostril 4 times a day as needed for congestion., Disp: 30 mL, Rfl: 12     Review of Systems   Constitutional:  Negative for chills, fatigue and fever.   HENT:  Negative for dental problem and trouble swallowing.    Eyes:  Negative for pain and visual disturbance.   Respiratory:  Negative for cough, shortness of breath and wheezing.    Cardiovascular:  Negative for chest pain, palpitations and leg swelling.    Gastrointestinal:  Negative for abdominal distention, abdominal pain, constipation, diarrhea and nausea.   Endocrine: Negative for cold intolerance, heat intolerance and polyuria.   Genitourinary:  Negative for difficulty urinating.   Musculoskeletal:  Negative for arthralgias, gait problem, joint swelling and myalgias.        Positive for left knee and right ankle gout Hx   Skin:  Negative for color change, pallor, rash and wound.   Allergic/Immunologic: Negative for environmental allergies and food allergies.   Neurological:  Positive for seizures. Negative for dizziness, weakness and headaches.        Positive for memory loss   Hematological:  Negative for adenopathy. Does not bruise/bleed easily.   Psychiatric/Behavioral:  Negative for behavioral problems.    All other systems reviewed and are negative.      Objective  There were no vitals taken for this visit.    Physical Exam  Constitutional:       General: He is not in acute distress.     Appearance: Normal appearance. He is not toxic-appearing.   HENT:      Head: Normocephalic and atraumatic.      Nose: Nose normal.      Mouth/Throat:      Mouth: Mucous membranes are moist.      Pharynx: Oropharynx is clear.   Eyes:      Extraocular Movements: Extraocular movements intact.      Conjunctiva/sclera: Conjunctivae normal.      Pupils: Pupils are equal, round, and reactive to light.   Cardiovascular:      Rate and Rhythm: Normal rate and regular rhythm.      Pulses: Normal pulses.      Heart sounds: Normal heart sounds. No murmur heard.  Pulmonary:      Effort: Pulmonary effort is normal.      Breath sounds: Normal breath sounds. No wheezing.   Abdominal:      General: Abdomen is flat. Bowel sounds are normal.      Palpations: Abdomen is soft.   Musculoskeletal:         General: Normal range of motion.      Cervical back: Neck supple.   Skin:     General: Skin is warm and dry.      Comments: Lori-area not observed   Neurological:      Mental Status: He is  alert. Mental status is at baseline.      Cranial Nerves: No cranial nerve deficit.      Motor: No weakness.      Comments: Oriented x 2-3. Has some memory loss   Psychiatric:         Mood and Affect: Mood normal.         Behavior: Behavior normal.         Assessment/Plan  Problem List Items Addressed This Visit             ICD-10-CM       Cardiac and Vasculature     Patient to continue current medication regiment. Staff to monitor weights and VS routinely. Provider to be notified any of new cardiac concerns         Benign essential HTN I10    CAD S/P percutaneous coronary angioplasty I25.10, Z98.61       Endocrine/Metabolic     Patient currently off of diabetic agents. Most recent A1C was 6.3 earlier this month. Staff may monitor blood glucose levels PRN         Controlled type 2 diabetes mellitus without complication, without long-term current use of insulin (CMS/[facility]) E11.9       Musculoskeletal and Injuries     Improved. Patient now on daily allopurinol. Staff to monitor patient's response and notify provider for any worsening or persistent S/S         Gout M10.9       Neuro     Patient now on anti-seizure medications. He is to maintain routine OP follow-up with neurology. Seizure precautions in place. OT/PT eval and tx, Son currently has emergency gaurdianship         CVA (cerebral vascular accident) (CMS/[facility]) I63.9    Aneurysm of middle cerebral artery - Primary I67.1        LEONCIO Wiggins      Electronically Signed By: LEONCIO Wiggins   1/2/24  9:19 PM

## 2023-12-27 NOTE — ASSESSMENT & PLAN NOTE
Will initiate short course of colchicine and then daily allopurinol. Staff to monitor patient's response and notify provider for any worsening or persistent S/S

## 2023-12-27 NOTE — ASSESSMENT & PLAN NOTE
Patient currently off of diabetic agents. Most recent A1C was 6.3 earlier this month. Staff may monitor blood glucose levels PRN

## 2023-12-27 NOTE — PROGRESS NOTES
Subjective   Patient ID: Jg Starr is a 63 y.o. male who is acute skilled care being seen and evaluated for multiple medical problems.    HPI     Review of Systems    Objective   There were no vitals taken for this visit.    Physical Exam    Assessment/Plan   Problem List Items Addressed This Visit             ICD-10-CM    Benign essential HTN - Primary I10    Gout M10.9    Ischemic cardiomyopathy I25.5    Left middle cerebral artery stroke (CMS/[facility]) I63.512    Controlled type 2 diabetes mellitus without complication, without long-term current use of insulin (CMS/[facility]) E11.9     Other Visit Diagnoses         Codes    Seizure (CMS/[facility])     R56.9    ETOH abuse     F10.10        This note is being entered for billing purposes only.  For complete documentation please see note that is present at the long-term care facility itself.     Goals    None

## 2023-12-27 NOTE — PROGRESS NOTES
Subjective   Patient ID: Jg Starr is a 63 y.o. male who presents for Gout Follow-up to [facility] Skilled services    HPI   Patient seen sitting up in bed in no acute distress. He states that associated gout pain has almost completed resolved after completing a course of colchicine. Diet education reinforced. Patient denies any current issues with appetite, staying hydrated, bowel, bladder or sleep. Patient ambulates independently and denies any recent falls. He continues to work with in house therapy services. Medications and chart reviewed. Discussed with nursing staff.      Current Outpatient Medications:     allopurinol (Zyloprim) 100 mg tablet, Take 1 tablet (100 mg) by mouth once daily., Disp: 30 tablet, Rfl:     aspirin 81 mg chewable tablet, , Disp: , Rfl:     atorvastatin (Lipitor) 80 mg tablet, Take 1 tablet (80 mg) by mouth once daily at bedtime., Disp: 90 tablet, Rfl: 1    diclofenac sodium (Voltaren) 1 % gel gel, Apply 1 Application topically 4 times a day as needed (knee pain)., Disp: , Rfl:     levETIRAcetam (Keppra) 750 mg tablet, Take 1 tablet (750 mg) by mouth 2 times a day., Disp: , Rfl:     lisinopril 40 mg tablet, Take 1 tablet (40 mg) by mouth once daily., Disp: 90 tablet, Rfl: 1    metoprolol succinate XL (Toprol-XL) 50 mg 24 hr tablet, Take 1 tablet (50 mg) by mouth once daily., Disp: 90 tablet, Rfl: 1    sodium chloride (Ocean) 0.65 % nasal spray, Administer 1 spray into each nostril 4 times a day as needed for congestion., Disp: 30 mL, Rfl: 12     Review of Systems   Constitutional:  Negative for chills, fatigue and fever.   HENT:  Negative for dental problem and trouble swallowing.    Eyes:  Negative for pain and visual disturbance.   Respiratory:  Negative for cough, shortness of breath and wheezing.    Cardiovascular:  Negative for chest pain, palpitations and leg swelling.   Gastrointestinal:  Negative for abdominal distention, abdominal pain, constipation, diarrhea  and nausea.   Endocrine: Negative for cold intolerance, heat intolerance and polyuria.   Genitourinary:  Negative for difficulty urinating.   Musculoskeletal:  Negative for arthralgias, gait problem, joint swelling and myalgias.        Positive for left knee and right ankle gout Hx   Skin:  Negative for color change, pallor, rash and wound.   Allergic/Immunologic: Negative for environmental allergies and food allergies.   Neurological:  Positive for seizures. Negative for dizziness, weakness and headaches.        Positive for memory loss   Hematological:  Negative for adenopathy. Does not bruise/bleed easily.   Psychiatric/Behavioral:  Negative for behavioral problems.    All other systems reviewed and are negative.      Objective   There were no vitals taken for this visit.    Physical Exam  Constitutional:       General: He is not in acute distress.     Appearance: Normal appearance. He is not toxic-appearing.   HENT:      Head: Normocephalic and atraumatic.      Nose: Nose normal.      Mouth/Throat:      Mouth: Mucous membranes are moist.      Pharynx: Oropharynx is clear.   Eyes:      Extraocular Movements: Extraocular movements intact.      Conjunctiva/sclera: Conjunctivae normal.      Pupils: Pupils are equal, round, and reactive to light.   Cardiovascular:      Rate and Rhythm: Normal rate and regular rhythm.      Pulses: Normal pulses.      Heart sounds: Normal heart sounds. No murmur heard.  Pulmonary:      Effort: Pulmonary effort is normal.      Breath sounds: Normal breath sounds. No wheezing.   Abdominal:      General: Abdomen is flat. Bowel sounds are normal.      Palpations: Abdomen is soft.   Musculoskeletal:         General: Normal range of motion.      Cervical back: Neck supple.   Skin:     General: Skin is warm and dry.      Comments: Lori-area not observed   Neurological:      Mental Status: He is alert. Mental status is at baseline.      Cranial Nerves: No cranial nerve deficit.      Motor: No  weakness.      Comments: Oriented x 2-3. Has some memory loss   Psychiatric:         Mood and Affect: Mood normal.         Behavior: Behavior normal.         Assessment/Plan   Problem List Items Addressed This Visit             ICD-10-CM       Cardiac and Vasculature     Patient to continue current medication regiment. Staff to monitor weights and VS routinely. Provider to be notified any of new cardiac concerns         Benign essential HTN I10    CAD S/P percutaneous coronary angioplasty I25.10, Z98.61       Endocrine/Metabolic     Patient currently off of diabetic agents. Most recent A1C was 6.3 earlier this month. Staff may monitor blood glucose levels PRN         Controlled type 2 diabetes mellitus without complication, without long-term current use of insulin (CMS/[facility]) E11.9       Musculoskeletal and Injuries     Improved. Patient now on daily allopurinol. Staff to monitor patient's response and notify provider for any worsening or persistent S/S         Gout M10.9       Neuro     Patient now on anti-seizure medications. He is to maintain routine OP follow-up with neurology. Seizure precautions in place. OT/PT eval and tx, Son currently has emergency gaurdianship         CVA (cerebral vascular accident) (CMS/[facility]) I63.9    Aneurysm of middle cerebral artery - Primary I67.1        Crys Wagner, APRN-CNP

## 2023-12-29 ENCOUNTER — NURSING HOME VISIT (OUTPATIENT)
Dept: POST ACUTE CARE | Facility: EXTERNAL LOCATION | Age: 63
End: 2023-12-29
Payer: MEDICAID

## 2023-12-29 DIAGNOSIS — I25.5 ISCHEMIC CARDIOMYOPATHY: Primary | ICD-10-CM

## 2023-12-29 DIAGNOSIS — I10 BENIGN ESSENTIAL HTN: ICD-10-CM

## 2023-12-29 PROCEDURE — 99309 SBSQ NF CARE MODERATE MDM 30: CPT | Performed by: FAMILY MEDICINE

## 2023-12-29 NOTE — LETTER
Patient: Jg Starr  : 1960    Encounter Date: 2023    Subjective  Patient ID: Jg Starr is a 63 y.o. male who is acute skilled care being seen and evaluated for multiple medical problems.    HPI     Review of Systems    Objective  There were no vitals taken for this visit.    Physical Exam    Assessment/Plan  Problem List Items Addressed This Visit             ICD-10-CM    Benign essential HTN I10    Ischemic cardiomyopathy - Primary I25.5        Goals    None     This note being entered for billing purposes only. For complete documentation please see chart at facility.       Electronically Signed By: Susannah Ortiz MD   24 12:40 PM

## 2024-01-02 PROBLEM — F01.B3 MODERATE VASCULAR DEMENTIA WITH MOOD DISTURBANCE (MULTI): Status: ACTIVE | Noted: 2024-01-02

## 2024-01-02 ASSESSMENT — ENCOUNTER SYMPTOMS
ARTHRALGIAS: 0
JOINT SWELLING: 0

## 2024-01-02 NOTE — PROGRESS NOTES
Subjective   Patient ID: Jg Starr is a 63 y.o. male who is acute skilled care being seen and evaluated for multiple medical problems.    HPI     Review of Systems    Objective   There were no vitals taken for this visit.    Physical Exam    Assessment/Plan   Problem List Items Addressed This Visit             ICD-10-CM    Benign essential HTN I10    Ischemic cardiomyopathy - Primary I25.5        Goals    None     This note being entered for billing purposes only. For complete documentation please see chart at facility.

## 2024-01-03 NOTE — ASSESSMENT & PLAN NOTE
Improved. Patient now on daily allopurinol. Staff to monitor patient's response and notify provider for any worsening or persistent S/S

## 2024-01-04 ENCOUNTER — NURSING HOME VISIT (OUTPATIENT)
Dept: POST ACUTE CARE | Facility: EXTERNAL LOCATION | Age: 64
End: 2024-01-04
Payer: MEDICAID

## 2024-01-04 DIAGNOSIS — I25.10 CAD S/P PERCUTANEOUS CORONARY ANGIOPLASTY: ICD-10-CM

## 2024-01-04 DIAGNOSIS — I67.1 ANEURYSM OF MIDDLE CEREBRAL ARTERY (HHS-HCC): Primary | ICD-10-CM

## 2024-01-04 DIAGNOSIS — Z98.61 CAD S/P PERCUTANEOUS CORONARY ANGIOPLASTY: ICD-10-CM

## 2024-01-04 DIAGNOSIS — R56.9 SEIZURE (MULTI): ICD-10-CM

## 2024-01-04 DIAGNOSIS — I10 BENIGN ESSENTIAL HTN: ICD-10-CM

## 2024-01-04 DIAGNOSIS — E11.9 CONTROLLED TYPE 2 DIABETES MELLITUS WITHOUT COMPLICATION, WITHOUT LONG-TERM CURRENT USE OF INSULIN (MULTI): ICD-10-CM

## 2024-01-04 PROCEDURE — 99309 SBSQ NF CARE MODERATE MDM 30: CPT | Performed by: NURSE PRACTITIONER

## 2024-01-04 ASSESSMENT — ENCOUNTER SYMPTOMS
WEAKNESS: 0
PALPITATIONS: 0
DIFFICULTY URINATING: 0
HEADACHES: 0
SEIZURES: 1
DIARRHEA: 0
TROUBLE SWALLOWING: 0
WOUND: 0
FATIGUE: 0
WHEEZING: 0
MYALGIAS: 0
ADENOPATHY: 0
CHILLS: 0
DIZZINESS: 0
ABDOMINAL DISTENTION: 0
NAUSEA: 0
EYE PAIN: 0
COLOR CHANGE: 0
SHORTNESS OF BREATH: 0
FEVER: 0
COUGH: 0
BRUISES/BLEEDS EASILY: 0
ARTHRALGIAS: 0
CONSTIPATION: 0
JOINT SWELLING: 0
ABDOMINAL PAIN: 0

## 2024-01-04 ASSESSMENT — PAIN SCALES - GENERAL: PAINLEVEL: 0-NO PAIN

## 2024-01-04 NOTE — LETTER
Patient: Jg Starr  : 1960    Encounter Date: 2024    Subjective  Patient ID: Jg Starr is a 63 y.o. male who presents for Routine Skilled Visit to [facility] Skilled services    HPI   Patient seen sitting up in bed in no acute distress. He is in good spirits as he is hoping to be discharged home with family in the near future. Patient denies any current issues with appetite, staying hydrated, bowel, bladder or sleep. Patient ambulates independently and denies any recent falls. He continues to work with in house therapy services. Medications and chart reviewed. Discussed with nursing staff.      Current Outpatient Medications:   •  allopurinol (Zyloprim) 100 mg tablet, Take 1 tablet (100 mg) by mouth once daily., Disp: 30 tablet, Rfl:   •  aspirin 81 mg chewable tablet, , Disp: , Rfl:   •  atorvastatin (Lipitor) 80 mg tablet, Take 1 tablet (80 mg) by mouth once daily at bedtime., Disp: 90 tablet, Rfl: 1  •  diclofenac sodium (Voltaren) 1 % gel gel, Apply 1 Application topically 4 times a day as needed (knee pain)., Disp: , Rfl:   •  levETIRAcetam (Keppra) 750 mg tablet, Take 1 tablet (750 mg) by mouth 2 times a day., Disp: , Rfl:   •  lisinopril 40 mg tablet, Take 1 tablet (40 mg) by mouth once daily., Disp: 90 tablet, Rfl: 1  •  metoprolol succinate XL (Toprol-XL) 50 mg 24 hr tablet, Take 1 tablet (50 mg) by mouth once daily., Disp: 90 tablet, Rfl: 1  •  sodium chloride (Ocean) 0.65 % nasal spray, Administer 1 spray into each nostril 4 times a day as needed for congestion., Disp: 30 mL, Rfl: 12     Review of Systems   Constitutional:  Negative for chills, fatigue and fever.   HENT:  Negative for dental problem and trouble swallowing.    Eyes:  Negative for pain and visual disturbance.   Respiratory:  Negative for cough, shortness of breath and wheezing.    Cardiovascular:  Negative for chest pain, palpitations and leg swelling.   Gastrointestinal:  Negative for abdominal  distention, abdominal pain, constipation, diarrhea and nausea.   Endocrine: Negative for cold intolerance, heat intolerance and polyuria.   Genitourinary:  Negative for difficulty urinating.   Musculoskeletal:  Negative for arthralgias, gait problem, joint swelling and myalgias.        Positive for left knee and right ankle gout Hx   Skin:  Negative for color change, pallor, rash and wound.   Allergic/Immunologic: Negative for environmental allergies and food allergies.   Neurological:  Positive for seizures. Negative for dizziness, weakness and headaches.        Positive for memory loss   Hematological:  Negative for adenopathy. Does not bruise/bleed easily.   Psychiatric/Behavioral:  Negative for behavioral problems.    All other systems reviewed and are negative.      Objective  There were no vitals taken for this visit.    Physical Exam  Constitutional:       General: He is not in acute distress.     Appearance: Normal appearance. He is not toxic-appearing.   HENT:      Head: Normocephalic and atraumatic.      Nose: Nose normal.      Mouth/Throat:      Mouth: Mucous membranes are moist.      Pharynx: Oropharynx is clear.   Eyes:      Extraocular Movements: Extraocular movements intact.      Conjunctiva/sclera: Conjunctivae normal.      Pupils: Pupils are equal, round, and reactive to light.   Cardiovascular:      Rate and Rhythm: Normal rate and regular rhythm.      Pulses: Normal pulses.      Heart sounds: Normal heart sounds. No murmur heard.  Pulmonary:      Effort: Pulmonary effort is normal.      Breath sounds: Normal breath sounds. No wheezing.   Abdominal:      General: Abdomen is flat. Bowel sounds are normal.      Palpations: Abdomen is soft.   Musculoskeletal:         General: Normal range of motion.      Cervical back: Neck supple.   Skin:     General: Skin is warm and dry.      Comments: Lori-area not observed   Neurological:      Mental Status: He is alert. Mental status is at baseline.      Cranial  Nerves: No cranial nerve deficit.      Motor: No weakness.      Comments: Oriented x 2-3. Has some memory loss   Psychiatric:         Mood and Affect: Mood normal.         Behavior: Behavior normal.         Assessment/Plan  Problem List Items Addressed This Visit             ICD-10-CM       Cardiac and Vasculature     Patient to continue current medication regiment. Staff to monitor weights and VS routinely. Provider to be notified any of new cardiac concerns         Benign essential HTN I10    CAD S/P percutaneous coronary angioplasty I25.10, Z98.61       Endocrine/Metabolic     Patient currently off of diabetic agents. Most recent A1C was 6.3 earlier this month. Staff may monitor blood glucose levels PRN         Controlled type 2 diabetes mellitus without complication, without long-term current use of insulin (CMS/[facility]) E11.9       Neuro     Patient now on anti-seizure medications. He is to maintain routine OP follow-up with neurology. Seizure precautions in place. Son currently has emergency guardianship and patient will be discharging home with him         Aneurysm of middle cerebral artery - Primary I67.1    Seizure (CMS/[facility]) R56.9        LEONCIO Wiggins      Electronically Signed By: LEONCIO Wiggins   1/4/24  3:10 PM

## 2024-01-04 NOTE — ASSESSMENT & PLAN NOTE
Patient now on anti-seizure medications. He is to maintain routine OP follow-up with neurology. Seizure precautions in place. Son currently has emergency guardianship and patient will be discharging home with him

## 2024-01-04 NOTE — PROGRESS NOTES
Subjective   Patient ID: Jg Starr is a 63 y.o. male who presents for Routine Skilled Visit to [facility] Skilled services    HPI   Patient seen sitting up in bed in no acute distress. He is in good spirits as he is hoping to be discharged home with family in the near future. Patient denies any current issues with appetite, staying hydrated, bowel, bladder or sleep. Patient ambulates independently and denies any recent falls. He continues to work with in house therapy services. Medications and chart reviewed. Discussed with nursing staff.      Current Outpatient Medications:     allopurinol (Zyloprim) 100 mg tablet, Take 1 tablet (100 mg) by mouth once daily., Disp: 30 tablet, Rfl:     aspirin 81 mg chewable tablet, , Disp: , Rfl:     atorvastatin (Lipitor) 80 mg tablet, Take 1 tablet (80 mg) by mouth once daily at bedtime., Disp: 90 tablet, Rfl: 1    diclofenac sodium (Voltaren) 1 % gel gel, Apply 1 Application topically 4 times a day as needed (knee pain)., Disp: , Rfl:     levETIRAcetam (Keppra) 750 mg tablet, Take 1 tablet (750 mg) by mouth 2 times a day., Disp: , Rfl:     lisinopril 40 mg tablet, Take 1 tablet (40 mg) by mouth once daily., Disp: 90 tablet, Rfl: 1    metoprolol succinate XL (Toprol-XL) 50 mg 24 hr tablet, Take 1 tablet (50 mg) by mouth once daily., Disp: 90 tablet, Rfl: 1    sodium chloride (Ocean) 0.65 % nasal spray, Administer 1 spray into each nostril 4 times a day as needed for congestion., Disp: 30 mL, Rfl: 12     Review of Systems   Constitutional:  Negative for chills, fatigue and fever.   HENT:  Negative for dental problem and trouble swallowing.    Eyes:  Negative for pain and visual disturbance.   Respiratory:  Negative for cough, shortness of breath and wheezing.    Cardiovascular:  Negative for chest pain, palpitations and leg swelling.   Gastrointestinal:  Negative for abdominal distention, abdominal pain, constipation, diarrhea and nausea.   Endocrine: Negative  for cold intolerance, heat intolerance and polyuria.   Genitourinary:  Negative for difficulty urinating.   Musculoskeletal:  Negative for arthralgias, gait problem, joint swelling and myalgias.        Positive for left knee and right ankle gout Hx   Skin:  Negative for color change, pallor, rash and wound.   Allergic/Immunologic: Negative for environmental allergies and food allergies.   Neurological:  Positive for seizures. Negative for dizziness, weakness and headaches.        Positive for memory loss   Hematological:  Negative for adenopathy. Does not bruise/bleed easily.   Psychiatric/Behavioral:  Negative for behavioral problems.    All other systems reviewed and are negative.      Objective   There were no vitals taken for this visit.    Physical Exam  Constitutional:       General: He is not in acute distress.     Appearance: Normal appearance. He is not toxic-appearing.   HENT:      Head: Normocephalic and atraumatic.      Nose: Nose normal.      Mouth/Throat:      Mouth: Mucous membranes are moist.      Pharynx: Oropharynx is clear.   Eyes:      Extraocular Movements: Extraocular movements intact.      Conjunctiva/sclera: Conjunctivae normal.      Pupils: Pupils are equal, round, and reactive to light.   Cardiovascular:      Rate and Rhythm: Normal rate and regular rhythm.      Pulses: Normal pulses.      Heart sounds: Normal heart sounds. No murmur heard.  Pulmonary:      Effort: Pulmonary effort is normal.      Breath sounds: Normal breath sounds. No wheezing.   Abdominal:      General: Abdomen is flat. Bowel sounds are normal.      Palpations: Abdomen is soft.   Musculoskeletal:         General: Normal range of motion.      Cervical back: Neck supple.   Skin:     General: Skin is warm and dry.      Comments: Lori-area not observed   Neurological:      Mental Status: He is alert. Mental status is at baseline.      Cranial Nerves: No cranial nerve deficit.      Motor: No weakness.      Comments: Oriented  x 2-3. Has some memory loss   Psychiatric:         Mood and Affect: Mood normal.         Behavior: Behavior normal.         Assessment/Plan   Problem List Items Addressed This Visit             ICD-10-CM       Cardiac and Vasculature     Patient to continue current medication regiment. Staff to monitor weights and VS routinely. Provider to be notified any of new cardiac concerns         Benign essential HTN I10    CAD S/P percutaneous coronary angioplasty I25.10, Z98.61       Endocrine/Metabolic     Patient currently off of diabetic agents. Most recent A1C was 6.3 earlier this month. Staff may monitor blood glucose levels PRN         Controlled type 2 diabetes mellitus without complication, without long-term current use of insulin (CMS/[facility]) E11.9       Neuro     Patient now on anti-seizure medications. He is to maintain routine OP follow-up with neurology. Seizure precautions in place. Son currently has emergency guardianship and patient will be discharging home with him         Aneurysm of middle cerebral artery - Primary I67.1    Seizure (CMS/[facility]) R56.9        Crys Wagner, APRN-CNP

## 2024-01-09 ENCOUNTER — NURSING HOME VISIT (OUTPATIENT)
Dept: POST ACUTE CARE | Facility: EXTERNAL LOCATION | Age: 64
End: 2024-01-09
Payer: MEDICAID

## 2024-01-09 DIAGNOSIS — I10 BENIGN ESSENTIAL HTN: Primary | ICD-10-CM

## 2024-01-09 DIAGNOSIS — I67.1 ANEURYSM OF MIDDLE CEREBRAL ARTERY (HHS-HCC): ICD-10-CM

## 2024-01-09 DIAGNOSIS — I63.9 CEREBROVASCULAR ACCIDENT (CVA), UNSPECIFIED MECHANISM (MULTI): ICD-10-CM

## 2024-01-09 DIAGNOSIS — I25.10 CAD S/P PERCUTANEOUS CORONARY ANGIOPLASTY: ICD-10-CM

## 2024-01-09 DIAGNOSIS — M10.9 GOUT OF KNEE, UNSPECIFIED CAUSE, UNSPECIFIED CHRONICITY, UNSPECIFIED LATERALITY: ICD-10-CM

## 2024-01-09 DIAGNOSIS — Z98.61 CAD S/P PERCUTANEOUS CORONARY ANGIOPLASTY: ICD-10-CM

## 2024-01-09 PROCEDURE — 99308 SBSQ NF CARE LOW MDM 20: CPT | Performed by: NURSE PRACTITIONER

## 2024-01-09 ASSESSMENT — ENCOUNTER SYMPTOMS
CONSTIPATION: 0
WHEEZING: 0
MYALGIAS: 0
DIARRHEA: 0
PALPITATIONS: 0
BRUISES/BLEEDS EASILY: 0
SEIZURES: 1
DIFFICULTY URINATING: 0
ADENOPATHY: 0
ABDOMINAL DISTENTION: 0
NAUSEA: 0
COLOR CHANGE: 0
DIZZINESS: 0
CHILLS: 0
WEAKNESS: 0
EYE PAIN: 0
TROUBLE SWALLOWING: 0
WOUND: 0
COUGH: 0
ABDOMINAL PAIN: 0
FATIGUE: 0
SHORTNESS OF BREATH: 0
HEADACHES: 0
JOINT SWELLING: 0
FEVER: 0
ARTHRALGIAS: 0

## 2024-01-09 ASSESSMENT — PAIN SCALES - GENERAL: PAINLEVEL: 0-NO PAIN

## 2024-01-09 NOTE — PROGRESS NOTES
Subjective   Patient ID: Jg Starr is a 63 y.o. male who presents for Routine Skilled Follow-up to [facility] Skilled services    HPI   Patient seen sitting up in bed in no acute distress. He remains in good spirits as he is hoping to be discharged home with family in the near future. Patient denies any current issues with appetite, staying hydrated, bowel, bladder or sleep. Patient ambulates independently and denies any recent falls. No recent gout issues reported and patient denies any pain. He continues to work with in house therapy services. Medications and chart reviewed. Discussed with nursing staff.      Current Outpatient Medications:     allopurinol (Zyloprim) 100 mg tablet, Take 1 tablet (100 mg) by mouth once daily., Disp: 30 tablet, Rfl:     aspirin 81 mg chewable tablet, , Disp: , Rfl:     atorvastatin (Lipitor) 80 mg tablet, Take 1 tablet (80 mg) by mouth once daily at bedtime., Disp: 90 tablet, Rfl: 1    diclofenac sodium (Voltaren) 1 % gel gel, Apply 1 Application topically 4 times a day as needed (knee pain)., Disp: , Rfl:     levETIRAcetam (Keppra) 750 mg tablet, Take 1 tablet (750 mg) by mouth 2 times a day., Disp: , Rfl:     lisinopril 40 mg tablet, Take 1 tablet (40 mg) by mouth once daily., Disp: 90 tablet, Rfl: 1    metoprolol succinate XL (Toprol-XL) 50 mg 24 hr tablet, Take 1 tablet (50 mg) by mouth once daily., Disp: 90 tablet, Rfl: 1    sodium chloride (Ocean) 0.65 % nasal spray, Administer 1 spray into each nostril 4 times a day as needed for congestion., Disp: 30 mL, Rfl: 12     Review of Systems   Constitutional:  Negative for chills, fatigue and fever.   HENT:  Negative for dental problem and trouble swallowing.    Eyes:  Negative for pain and visual disturbance.   Respiratory:  Negative for cough, shortness of breath and wheezing.    Cardiovascular:  Negative for chest pain, palpitations and leg swelling.   Gastrointestinal:  Negative for abdominal distention,  abdominal pain, constipation, diarrhea and nausea.   Endocrine: Negative for cold intolerance, heat intolerance and polyuria.   Genitourinary:  Negative for difficulty urinating.   Musculoskeletal:  Negative for arthralgias, gait problem, joint swelling and myalgias.        Positive for left knee and right ankle gout Hx   Skin:  Negative for color change, pallor, rash and wound.   Allergic/Immunologic: Negative for environmental allergies and food allergies.   Neurological:  Positive for seizures. Negative for dizziness, weakness and headaches.        Positive for memory loss   Hematological:  Negative for adenopathy. Does not bruise/bleed easily.   Psychiatric/Behavioral:  Negative for behavioral problems.    All other systems reviewed and are negative.      Objective   There were no vitals taken for this visit.    Physical Exam  Constitutional:       General: He is not in acute distress.     Appearance: Normal appearance. He is not toxic-appearing.   HENT:      Head: Normocephalic and atraumatic.      Nose: Nose normal.      Mouth/Throat:      Mouth: Mucous membranes are moist.      Pharynx: Oropharynx is clear.   Eyes:      Extraocular Movements: Extraocular movements intact.      Conjunctiva/sclera: Conjunctivae normal.      Pupils: Pupils are equal, round, and reactive to light.   Cardiovascular:      Rate and Rhythm: Normal rate and regular rhythm.      Pulses: Normal pulses.      Heart sounds: Normal heart sounds. No murmur heard.  Pulmonary:      Effort: Pulmonary effort is normal.      Breath sounds: Normal breath sounds. No wheezing.   Abdominal:      General: Abdomen is flat. Bowel sounds are normal.      Palpations: Abdomen is soft.   Musculoskeletal:         General: Normal range of motion.      Cervical back: Neck supple.   Skin:     General: Skin is warm and dry.      Comments: Lori-area not observed   Neurological:      Mental Status: He is alert. Mental status is at baseline.      Cranial Nerves: No  cranial nerve deficit.      Motor: No weakness.      Comments: Oriented x 2-3. Has some memory loss   Psychiatric:         Mood and Affect: Mood normal.         Behavior: Behavior normal.         Assessment/Plan   Problem List Items Addressed This Visit             ICD-10-CM       Cardiac and Vasculature     Patient to continue current medication regiment. Staff to monitor weights and VS routinely. Provider to be notified any of new cardiac concerns         Benign essential HTN - Primary I10    CAD S/P percutaneous coronary angioplasty I25.10, Z98.61       Musculoskeletal and Injuries     Improved. Patient now on daily allopurinol. Staff to monitor patient's response and notify provider for any worsening or persistent S/S         Gout M10.9       Neuro     Patient remains on anti-seizure medications. He is to maintain routine OP follow-up with neurology. Seizure precautions in place. Son currently has emergency guardianship and patient will be discharging home with him         CVA (cerebral vascular accident) (CMS/[facility]) I63.9    Aneurysm of middle cerebral artery I67.1        Crys Wagner, APRN-CNP

## 2024-01-09 NOTE — LETTER
Patient: Jg Starr  : 1960    Encounter Date: 2024    Subjective  Patient ID: Jg Starr is a 63 y.o. male who presents for Routine Skilled Follow-up to [facility] Skilled services    HPI   Patient seen sitting up in bed in no acute distress. He remains in good spirits as he is hoping to be discharged home with family in the near future. Patient denies any current issues with appetite, staying hydrated, bowel, bladder or sleep. Patient ambulates independently and denies any recent falls. No recent gout issues reported and patient denies any pain. He continues to work with in house therapy services. Medications and chart reviewed. Discussed with nursing staff.      Current Outpatient Medications:   •  allopurinol (Zyloprim) 100 mg tablet, Take 1 tablet (100 mg) by mouth once daily., Disp: 30 tablet, Rfl:   •  aspirin 81 mg chewable tablet, , Disp: , Rfl:   •  atorvastatin (Lipitor) 80 mg tablet, Take 1 tablet (80 mg) by mouth once daily at bedtime., Disp: 90 tablet, Rfl: 1  •  diclofenac sodium (Voltaren) 1 % gel gel, Apply 1 Application topically 4 times a day as needed (knee pain)., Disp: , Rfl:   •  levETIRAcetam (Keppra) 750 mg tablet, Take 1 tablet (750 mg) by mouth 2 times a day., Disp: , Rfl:   •  lisinopril 40 mg tablet, Take 1 tablet (40 mg) by mouth once daily., Disp: 90 tablet, Rfl: 1  •  metoprolol succinate XL (Toprol-XL) 50 mg 24 hr tablet, Take 1 tablet (50 mg) by mouth once daily., Disp: 90 tablet, Rfl: 1  •  sodium chloride (Ocean) 0.65 % nasal spray, Administer 1 spray into each nostril 4 times a day as needed for congestion., Disp: 30 mL, Rfl: 12     Review of Systems   Constitutional:  Negative for chills, fatigue and fever.   HENT:  Negative for dental problem and trouble swallowing.    Eyes:  Negative for pain and visual disturbance.   Respiratory:  Negative for cough, shortness of breath and wheezing.    Cardiovascular:  Negative for chest pain, palpitations  and leg swelling.   Gastrointestinal:  Negative for abdominal distention, abdominal pain, constipation, diarrhea and nausea.   Endocrine: Negative for cold intolerance, heat intolerance and polyuria.   Genitourinary:  Negative for difficulty urinating.   Musculoskeletal:  Negative for arthralgias, gait problem, joint swelling and myalgias.        Positive for left knee and right ankle gout Hx   Skin:  Negative for color change, pallor, rash and wound.   Allergic/Immunologic: Negative for environmental allergies and food allergies.   Neurological:  Positive for seizures. Negative for dizziness, weakness and headaches.        Positive for memory loss   Hematological:  Negative for adenopathy. Does not bruise/bleed easily.   Psychiatric/Behavioral:  Negative for behavioral problems.    All other systems reviewed and are negative.      Objective  There were no vitals taken for this visit.    Physical Exam  Constitutional:       General: He is not in acute distress.     Appearance: Normal appearance. He is not toxic-appearing.   HENT:      Head: Normocephalic and atraumatic.      Nose: Nose normal.      Mouth/Throat:      Mouth: Mucous membranes are moist.      Pharynx: Oropharynx is clear.   Eyes:      Extraocular Movements: Extraocular movements intact.      Conjunctiva/sclera: Conjunctivae normal.      Pupils: Pupils are equal, round, and reactive to light.   Cardiovascular:      Rate and Rhythm: Normal rate and regular rhythm.      Pulses: Normal pulses.      Heart sounds: Normal heart sounds. No murmur heard.  Pulmonary:      Effort: Pulmonary effort is normal.      Breath sounds: Normal breath sounds. No wheezing.   Abdominal:      General: Abdomen is flat. Bowel sounds are normal.      Palpations: Abdomen is soft.   Musculoskeletal:         General: Normal range of motion.      Cervical back: Neck supple.   Skin:     General: Skin is warm and dry.      Comments: Lori-area not observed   Neurological:      Mental  Status: He is alert. Mental status is at baseline.      Cranial Nerves: No cranial nerve deficit.      Motor: No weakness.      Comments: Oriented x 2-3. Has some memory loss   Psychiatric:         Mood and Affect: Mood normal.         Behavior: Behavior normal.         Assessment/Plan  Problem List Items Addressed This Visit             ICD-10-CM       Cardiac and Vasculature     Patient to continue current medication regiment. Staff to monitor weights and VS routinely. Provider to be notified any of new cardiac concerns         Benign essential HTN - Primary I10    CAD S/P percutaneous coronary angioplasty I25.10, Z98.61       Musculoskeletal and Injuries     Improved. Patient now on daily allopurinol. Staff to monitor patient's response and notify provider for any worsening or persistent S/S         Gout M10.9       Neuro     Patient remains on anti-seizure medications. He is to maintain routine OP follow-up with neurology. Seizure precautions in place. Son currently has emergency guardianship and patient will be discharging home with him         CVA (cerebral vascular accident) (CMS/[facility]) I63.9    Aneurysm of middle cerebral artery I67.1        LEONCIO Wiggins      Electronically Signed By: LEONCIO Wiggins   1/11/24  1:29 PM

## 2024-01-11 ENCOUNTER — NURSING HOME VISIT (OUTPATIENT)
Dept: POST ACUTE CARE | Facility: EXTERNAL LOCATION | Age: 64
End: 2024-01-11
Payer: MEDICAID

## 2024-01-11 VITALS
BODY MASS INDEX: 33.52 KG/M2 | WEIGHT: 261.1 LBS | DIASTOLIC BLOOD PRESSURE: 76 MMHG | OXYGEN SATURATION: 98 % | SYSTOLIC BLOOD PRESSURE: 134 MMHG | HEART RATE: 72 BPM | TEMPERATURE: 98 F

## 2024-01-11 DIAGNOSIS — I10 BENIGN ESSENTIAL HTN: ICD-10-CM

## 2024-01-11 DIAGNOSIS — M10.9 GOUT OF FOOT, UNSPECIFIED CAUSE, UNSPECIFIED CHRONICITY, UNSPECIFIED LATERALITY: ICD-10-CM

## 2024-01-11 DIAGNOSIS — M79.671 ACUTE PAIN OF RIGHT FOOT: Primary | ICD-10-CM

## 2024-01-11 DIAGNOSIS — Z98.61 CAD S/P PERCUTANEOUS CORONARY ANGIOPLASTY: ICD-10-CM

## 2024-01-11 DIAGNOSIS — I25.10 CAD S/P PERCUTANEOUS CORONARY ANGIOPLASTY: ICD-10-CM

## 2024-01-11 DIAGNOSIS — E11.9 CONTROLLED TYPE 2 DIABETES MELLITUS WITHOUT COMPLICATION, WITHOUT LONG-TERM CURRENT USE OF INSULIN (MULTI): ICD-10-CM

## 2024-01-11 PROCEDURE — 99309 SBSQ NF CARE MODERATE MDM 30: CPT | Performed by: NURSE PRACTITIONER

## 2024-01-11 ASSESSMENT — ENCOUNTER SYMPTOMS
HEADACHES: 0
COLOR CHANGE: 0
NAUSEA: 0
DIARRHEA: 0
TROUBLE SWALLOWING: 0
WOUND: 0
DIZZINESS: 0
DIFFICULTY URINATING: 0
CONSTIPATION: 0
FEVER: 0
PALPITATIONS: 0
ADENOPATHY: 0
CHILLS: 0
ABDOMINAL DISTENTION: 0
SEIZURES: 1
JOINT SWELLING: 0
COUGH: 0
EYE PAIN: 0
ABDOMINAL PAIN: 0
BRUISES/BLEEDS EASILY: 0
MYALGIAS: 0
WEAKNESS: 0
FATIGUE: 0
SHORTNESS OF BREATH: 0
WHEEZING: 0

## 2024-01-11 ASSESSMENT — PAIN SCALES - GENERAL: PAINLEVEL: 0-NO PAIN

## 2024-01-11 NOTE — LETTER
Patient: Jg Starr  : 1960    Encounter Date: 2024    Subjective  Patient ID: Jg Starr is a 63 y.o. male who presents for Foot Pain to [facility]    HPI   Patient seen sitting up in bed in no acute distress. He is complaining of new onset right foot pain and he is concerned that he may be having another gait flair. Patient does admit to doing new foot exercises in therapy two days ago right before the pain started. Patient denies any other known injury or trauma. Patient denies any current issues with appetite, staying hydrated, bowel, bladder or sleep. Patient ambulates independently and denies any recent falls. He continues to work with in house therapy services. Medications and chart reviewed. Discussed with nursing staff.      Current Outpatient Medications:   •  allopurinol (Zyloprim) 100 mg tablet, Take 1 tablet (100 mg) by mouth once daily., Disp: 30 tablet, Rfl:   •  aspirin 81 mg chewable tablet, , Disp: , Rfl:   •  atorvastatin (Lipitor) 80 mg tablet, Take 1 tablet (80 mg) by mouth once daily at bedtime., Disp: 90 tablet, Rfl: 1  •  diclofenac sodium (Voltaren) 1 % gel gel, Apply 1 Application topically 4 times a day as needed (knee pain)., Disp: , Rfl:   •  levETIRAcetam (Keppra) 750 mg tablet, Take 1 tablet (750 mg) by mouth 2 times a day., Disp: , Rfl:   •  lisinopril 40 mg tablet, Take 1 tablet (40 mg) by mouth once daily., Disp: 90 tablet, Rfl: 1  •  metoprolol succinate XL (Toprol-XL) 50 mg 24 hr tablet, Take 1 tablet (50 mg) by mouth once daily., Disp: 90 tablet, Rfl: 1  •  sodium chloride (Ocean) 0.65 % nasal spray, Administer 1 spray into each nostril 4 times a day as needed for congestion., Disp: 30 mL, Rfl: 12     Review of Systems   Constitutional:  Negative for chills, fatigue and fever.   HENT:  Negative for dental problem and trouble swallowing.    Eyes:  Negative for pain and visual disturbance.   Respiratory:  Negative for cough,  shortness of breath and wheezing.    Cardiovascular:  Negative for chest pain, palpitations and leg swelling.   Gastrointestinal:  Negative for abdominal distention, abdominal pain, constipation, diarrhea and nausea.   Endocrine: Negative for cold intolerance, heat intolerance and polyuria.   Genitourinary:  Negative for difficulty urinating.   Musculoskeletal:  Positive for arthralgias. Negative for gait problem, joint swelling and myalgias.        See HPI   Skin:  Negative for color change, pallor, rash and wound.   Allergic/Immunologic: Negative for environmental allergies and food allergies.   Neurological:  Positive for seizures. Negative for dizziness, weakness and headaches.        Positive for memory loss   Hematological:  Negative for adenopathy. Does not bruise/bleed easily.   Psychiatric/Behavioral:  Negative for behavioral problems.    All other systems reviewed and are negative.      Objective  /76   Pulse 72   Temp 36.7 °C (98 °F)   Wt 118 kg (261 lb 1.6 oz)   SpO2 98%   BMI 33.52 kg/m²     Physical Exam  Constitutional:       General: He is not in acute distress.     Appearance: Normal appearance. He is not toxic-appearing.   HENT:      Head: Normocephalic and atraumatic.      Nose: Nose normal.      Mouth/Throat:      Mouth: Mucous membranes are moist.      Pharynx: Oropharynx is clear.   Eyes:      Extraocular Movements: Extraocular movements intact.      Conjunctiva/sclera: Conjunctivae normal.      Pupils: Pupils are equal, round, and reactive to light.   Cardiovascular:      Rate and Rhythm: Normal rate and regular rhythm.      Pulses: Normal pulses.      Heart sounds: Normal heart sounds. No murmur heard.  Pulmonary:      Effort: Pulmonary effort is normal.      Breath sounds: Normal breath sounds. No wheezing.   Abdominal:      General: Abdomen is flat. Bowel sounds are normal.      Palpations: Abdomen is soft.   Musculoskeletal:      Cervical back: Neck supple.      Comments:  Tenderness to right foot with weight bearing   Skin:     General: Skin is warm and dry.      Comments: Lori-area not observed   Neurological:      Mental Status: He is alert. Mental status is at baseline.      Cranial Nerves: No cranial nerve deficit.      Motor: No weakness.      Comments: Oriented x 2-3. Has some memory loss   Psychiatric:         Mood and Affect: Mood normal.         Behavior: Behavior normal.         Assessment/Plan  Problem List Items Addressed This Visit             ICD-10-CM       Cardiac and Vasculature     Patient to continue current medication regiment. Staff to monitor weights and VS routinely. Provider to be notified any of new cardiac concerns         Benign essential HTN I10    CAD S/P percutaneous coronary angioplasty I25.10, Z98.61       Endocrine/Metabolic     Patient currently off of diabetic agents. Most recent A1C was 6.3 earlier this month. Staff may monitor blood glucose levels PRN         Controlled type 2 diabetes mellitus without complication, without long-term current use of insulin (CMS/[facility]) E11.9       Musculoskeletal and Injuries     Gout flair? Trauma? Patient continues on daily allopurinol. Orders in place for xray and blood work to completed for assess for root cause of pain         Gout M10.9    Acute pain of right foot - Primary M79.671        NISH Wiggins-PAVEL      Electronically Signed By: LEONCIO Wiggins   1/15/24 10:34 PM

## 2024-01-11 NOTE — PROGRESS NOTES
Subjective   Patient ID: Jg Starr is a 63 y.o. male who presents for Foot Pain to [facility] Skilled services    HPI   Patient seen sitting up in bed in no acute distress. He is complaining of new onset right foot pain and he is concerned that he may be having another gait flair. Patient does admit to doing new foot exercises in therapy two days ago right before the pain started. Patient denies any other known injury or trauma. Patient denies any current issues with appetite, staying hydrated, bowel, bladder or sleep. Patient ambulates independently and denies any recent falls. He continues to work with in house therapy services. Medications and chart reviewed. Discussed with nursing staff.      Current Outpatient Medications:     allopurinol (Zyloprim) 100 mg tablet, Take 1 tablet (100 mg) by mouth once daily., Disp: 30 tablet, Rfl:     aspirin 81 mg chewable tablet, , Disp: , Rfl:     atorvastatin (Lipitor) 80 mg tablet, Take 1 tablet (80 mg) by mouth once daily at bedtime., Disp: 90 tablet, Rfl: 1    diclofenac sodium (Voltaren) 1 % gel gel, Apply 1 Application topically 4 times a day as needed (knee pain)., Disp: , Rfl:     levETIRAcetam (Keppra) 750 mg tablet, Take 1 tablet (750 mg) by mouth 2 times a day., Disp: , Rfl:     lisinopril 40 mg tablet, Take 1 tablet (40 mg) by mouth once daily., Disp: 90 tablet, Rfl: 1    metoprolol succinate XL (Toprol-XL) 50 mg 24 hr tablet, Take 1 tablet (50 mg) by mouth once daily., Disp: 90 tablet, Rfl: 1    sodium chloride (Ocean) 0.65 % nasal spray, Administer 1 spray into each nostril 4 times a day as needed for congestion., Disp: 30 mL, Rfl: 12     Review of Systems   Constitutional:  Negative for chills, fatigue and fever.   HENT:  Negative for dental problem and trouble swallowing.    Eyes:  Negative for pain and visual disturbance.   Respiratory:  Negative for cough, shortness of breath and wheezing.    Cardiovascular:  Negative for chest pain,  palpitations and leg swelling.   Gastrointestinal:  Negative for abdominal distention, abdominal pain, constipation, diarrhea and nausea.   Endocrine: Negative for cold intolerance, heat intolerance and polyuria.   Genitourinary:  Negative for difficulty urinating.   Musculoskeletal:  Positive for arthralgias. Negative for gait problem, joint swelling and myalgias.        See HPI   Skin:  Negative for color change, pallor, rash and wound.   Allergic/Immunologic: Negative for environmental allergies and food allergies.   Neurological:  Positive for seizures. Negative for dizziness, weakness and headaches.        Positive for memory loss   Hematological:  Negative for adenopathy. Does not bruise/bleed easily.   Psychiatric/Behavioral:  Negative for behavioral problems.    All other systems reviewed and are negative.      Objective   /76   Pulse 72   Temp 36.7 °C (98 °F)   Wt 118 kg (261 lb 1.6 oz)   SpO2 98%   BMI 33.52 kg/m²     Physical Exam  Constitutional:       General: He is not in acute distress.     Appearance: Normal appearance. He is not toxic-appearing.   HENT:      Head: Normocephalic and atraumatic.      Nose: Nose normal.      Mouth/Throat:      Mouth: Mucous membranes are moist.      Pharynx: Oropharynx is clear.   Eyes:      Extraocular Movements: Extraocular movements intact.      Conjunctiva/sclera: Conjunctivae normal.      Pupils: Pupils are equal, round, and reactive to light.   Cardiovascular:      Rate and Rhythm: Normal rate and regular rhythm.      Pulses: Normal pulses.      Heart sounds: Normal heart sounds. No murmur heard.  Pulmonary:      Effort: Pulmonary effort is normal.      Breath sounds: Normal breath sounds. No wheezing.   Abdominal:      General: Abdomen is flat. Bowel sounds are normal.      Palpations: Abdomen is soft.   Musculoskeletal:      Cervical back: Neck supple.      Comments: Tenderness to right foot with weight bearing   Skin:     General: Skin is warm and  dry.      Comments: Lori-area not observed   Neurological:      Mental Status: He is alert. Mental status is at baseline.      Cranial Nerves: No cranial nerve deficit.      Motor: No weakness.      Comments: Oriented x 2-3. Has some memory loss   Psychiatric:         Mood and Affect: Mood normal.         Behavior: Behavior normal.         Assessment/Plan   Problem List Items Addressed This Visit             ICD-10-CM       Cardiac and Vasculature     Patient to continue current medication regiment. Staff to monitor weights and VS routinely. Provider to be notified any of new cardiac concerns         Benign essential HTN I10    CAD S/P percutaneous coronary angioplasty I25.10, Z98.61       Endocrine/Metabolic     Patient currently off of diabetic agents. Most recent A1C was 6.3 earlier this month. Staff may monitor blood glucose levels PRN         Controlled type 2 diabetes mellitus without complication, without long-term current use of insulin (CMS/[facility]) E11.9       Musculoskeletal and Injuries     Gout flair? Trauma? Patient continues on daily allopurinol. Orders in place for xray and blood work to completed for assess for root cause of pain         Gout M10.9    Acute pain of right foot - Primary M79.671        Crys Wagner, APRN-CNP

## 2024-01-11 NOTE — ASSESSMENT & PLAN NOTE
Patient remains on anti-seizure medications. He is to maintain routine OP follow-up with neurology. Seizure precautions in place. Son currently has emergency guardianship and patient will be discharging home with him

## 2024-01-12 ENCOUNTER — NURSING HOME VISIT (OUTPATIENT)
Dept: POST ACUTE CARE | Facility: EXTERNAL LOCATION | Age: 64
End: 2024-01-12
Payer: MEDICAID

## 2024-01-12 DIAGNOSIS — I25.10 CAD S/P PERCUTANEOUS CORONARY ANGIOPLASTY: ICD-10-CM

## 2024-01-12 DIAGNOSIS — I10 BENIGN ESSENTIAL HTN: ICD-10-CM

## 2024-01-12 DIAGNOSIS — M10.9 GOUT OF KNEE, UNSPECIFIED CAUSE, UNSPECIFIED CHRONICITY, UNSPECIFIED LATERALITY: Primary | ICD-10-CM

## 2024-01-12 DIAGNOSIS — Z98.61 CAD S/P PERCUTANEOUS CORONARY ANGIOPLASTY: ICD-10-CM

## 2024-01-12 DIAGNOSIS — R56.9 SEIZURE (MULTI): ICD-10-CM

## 2024-01-12 DIAGNOSIS — I63.512 LEFT MIDDLE CEREBRAL ARTERY STROKE (MULTI): ICD-10-CM

## 2024-01-12 PROCEDURE — 99315 NF DSCHRG MGMT 30 MIN/LESS: CPT | Performed by: FAMILY MEDICINE

## 2024-01-12 NOTE — LETTER
Patient: Jg Starr  : 1960    Encounter Date: 2024    Subjective  Patient ID: Jg Starr is a 63 y.o. male who is acute skilled care being seen and evaluated for multiple medical problems.    HPI     Review of Systems    Objective  There were no vitals taken for this visit.    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  Please see facility documentation for complete note.   Goals    None           Electronically Signed By: Susannah Ortiz MD   1/15/24 10:08 AM

## 2024-01-15 PROBLEM — M79.671 ACUTE PAIN OF RIGHT FOOT: Status: ACTIVE | Noted: 2024-01-15

## 2024-01-15 ASSESSMENT — ENCOUNTER SYMPTOMS: ARTHRALGIAS: 1

## 2024-01-15 NOTE — PROGRESS NOTES
Subjective   Patient ID: Jg Starr is a 63 y.o. male who is acute skilled care being seen and evaluated for multiple medical problems.    HPI     Review of Systems    Objective   There were no vitals taken for this visit.    Physical Exam    Assessment/Plan   Problem List Items Addressed This Visit             ICD-10-CM    Benign essential HTN I10    CAD S/P percutaneous coronary angioplasty I25.10, Z98.61    Gout - Primary M10.9    Left middle cerebral artery stroke (CMS/[facility]) I63.512    Seizure (CMS/[facility]) R56.9     This note is for billing purposes only.  Please see facility documentation for complete note.   Goals    None

## 2024-01-16 NOTE — ASSESSMENT & PLAN NOTE
Gout flair? Trauma? Patient continues on daily allopurinol. Orders in place for xray and blood work to completed for assess for root cause of pain

## 2024-02-08 DIAGNOSIS — M10.9 GOUT OF MULTIPLE SITES, UNSPECIFIED CAUSE, UNSPECIFIED CHRONICITY: ICD-10-CM

## 2024-02-08 DIAGNOSIS — R56.9 SEIZURE (MULTI): ICD-10-CM

## 2024-02-08 RX ORDER — ALLOPURINOL 100 MG/1
100 TABLET ORAL DAILY
Qty: 30 TABLET | Refills: 2 | Status: SHIPPED | OUTPATIENT
Start: 2024-02-08 | End: 2024-04-25 | Stop reason: SDUPTHER

## 2024-02-08 RX ORDER — LEVETIRACETAM 750 MG/1
750 TABLET ORAL 2 TIMES DAILY
Qty: 90 TABLET | Refills: 1 | Status: SHIPPED | OUTPATIENT
Start: 2024-02-08 | End: 2024-04-25 | Stop reason: SDUPTHER

## 2024-04-25 ENCOUNTER — OFFICE VISIT (OUTPATIENT)
Dept: PRIMARY CARE | Facility: CLINIC | Age: 64
End: 2024-04-25
Payer: MEDICAID

## 2024-04-25 VITALS
HEIGHT: 74 IN | HEART RATE: 73 BPM | OXYGEN SATURATION: 93 % | BODY MASS INDEX: 31.06 KG/M2 | DIASTOLIC BLOOD PRESSURE: 82 MMHG | SYSTOLIC BLOOD PRESSURE: 143 MMHG | WEIGHT: 242 LBS

## 2024-04-25 DIAGNOSIS — M10.9 GOUT OF KNEE, UNSPECIFIED CAUSE, UNSPECIFIED CHRONICITY, UNSPECIFIED LATERALITY: ICD-10-CM

## 2024-04-25 DIAGNOSIS — R56.9 SEIZURE (MULTI): ICD-10-CM

## 2024-04-25 DIAGNOSIS — M10.9 GOUT OF MULTIPLE SITES, UNSPECIFIED CAUSE, UNSPECIFIED CHRONICITY: ICD-10-CM

## 2024-04-25 DIAGNOSIS — I10 BENIGN ESSENTIAL HTN: ICD-10-CM

## 2024-04-25 PROCEDURE — 4010F ACE/ARB THERAPY RXD/TAKEN: CPT | Performed by: NURSE PRACTITIONER

## 2024-04-25 PROCEDURE — 99214 OFFICE O/P EST MOD 30 MIN: CPT | Performed by: NURSE PRACTITIONER

## 2024-04-25 PROCEDURE — 3079F DIAST BP 80-89 MM HG: CPT | Performed by: NURSE PRACTITIONER

## 2024-04-25 PROCEDURE — 1036F TOBACCO NON-USER: CPT | Performed by: NURSE PRACTITIONER

## 2024-04-25 PROCEDURE — 3077F SYST BP >= 140 MM HG: CPT | Performed by: NURSE PRACTITIONER

## 2024-04-25 RX ORDER — METOPROLOL SUCCINATE 50 MG/1
50 TABLET, EXTENDED RELEASE ORAL DAILY
Qty: 90 TABLET | Refills: 3 | Status: SHIPPED | OUTPATIENT
Start: 2024-04-25

## 2024-04-25 RX ORDER — LEVETIRACETAM 750 MG/1
750 TABLET ORAL 2 TIMES DAILY
Qty: 90 TABLET | Refills: 3 | Status: SHIPPED | OUTPATIENT
Start: 2024-04-25 | End: 2024-05-15 | Stop reason: SDUPTHER

## 2024-04-25 RX ORDER — LISINOPRIL 40 MG/1
40 TABLET ORAL DAILY
Qty: 90 TABLET | Refills: 3 | Status: SHIPPED | OUTPATIENT
Start: 2024-04-25

## 2024-04-25 RX ORDER — ATORVASTATIN CALCIUM 80 MG/1
80 TABLET, FILM COATED ORAL NIGHTLY
Qty: 90 TABLET | Refills: 3 | Status: SHIPPED | OUTPATIENT
Start: 2024-04-25

## 2024-04-25 RX ORDER — ALLOPURINOL 100 MG/1
100 TABLET ORAL DAILY
Qty: 90 TABLET | Refills: 3 | Status: SHIPPED | OUTPATIENT
Start: 2024-04-25

## 2024-04-25 ASSESSMENT — PATIENT HEALTH QUESTIONNAIRE - PHQ9
SUM OF ALL RESPONSES TO PHQ9 QUESTIONS 1 AND 2: 0
1. LITTLE INTEREST OR PLEASURE IN DOING THINGS: NOT AT ALL
2. FEELING DOWN, DEPRESSED OR HOPELESS: NOT AT ALL

## 2024-04-25 ASSESSMENT — ENCOUNTER SYMPTOMS
CONSTITUTIONAL NEGATIVE: 1
CARDIOVASCULAR NEGATIVE: 1
GASTROINTESTINAL NEGATIVE: 1
NEUROLOGICAL NEGATIVE: 1
PSYCHIATRIC NEGATIVE: 1
EYES NEGATIVE: 1
RESPIRATORY NEGATIVE: 1

## 2024-04-25 NOTE — PROGRESS NOTES
"Subjective   Patient ID: Jg Starr is a 63 y.o. male who presents for follow up and med refills.    HPI   Overall doing well.  Eating proper diet, gout is under control. Has not had a flare up.  Denies chest pain, SOB, palpitations, dizziness,  or GI issues.  Taking medications as prescribed       Review of Systems   Constitutional: Negative.    HENT: Negative.     Eyes: Negative.    Respiratory: Negative.     Cardiovascular: Negative.    Gastrointestinal: Negative.    Genitourinary: Negative.    Skin: Negative.    Neurological: Negative.    Psychiatric/Behavioral: Negative.         Objective   /82   Pulse 73   Ht 1.88 m (6' 2\")   Wt 110 kg (242 lb)   SpO2 93%   BMI 31.07 kg/m²     Physical Exam  Constitutional:       General: He is not in acute distress.     Appearance: Normal appearance.   HENT:      Head: Normocephalic and atraumatic.      Right Ear: Tympanic membrane, ear canal and external ear normal.      Left Ear: Tympanic membrane, ear canal and external ear normal.      Nose: Nose normal.      Mouth/Throat:      Mouth: Mucous membranes are moist.      Pharynx: Oropharynx is clear.   Eyes:      Extraocular Movements: Extraocular movements intact.      Conjunctiva/sclera: Conjunctivae normal.      Pupils: Pupils are equal, round, and reactive to light.   Cardiovascular:      Rate and Rhythm: Normal rate and regular rhythm.      Pulses: Normal pulses.      Heart sounds: Normal heart sounds. No murmur heard.  Pulmonary:      Effort: Pulmonary effort is normal.      Breath sounds: Normal breath sounds. No wheezing, rhonchi or rales.   Abdominal:      General: Bowel sounds are normal.      Palpations: Abdomen is soft.      Tenderness: There is no abdominal tenderness.   Musculoskeletal:         General: Normal range of motion.      Cervical back: Normal range of motion and neck supple.   Lymphadenopathy:      Comments: No lymphadenopathy noted   Skin:     General: Skin is warm and dry.      " Findings: No rash.   Neurological:      General: No focal deficit present.      Mental Status: He is alert and oriented to person, place, and time.      Cranial Nerves: No cranial nerve deficit.      Coordination: Coordination normal.      Gait: Gait normal.   Psychiatric:         Mood and Affect: Mood normal.         Behavior: Behavior normal.         Assessment/Plan   Problem List Items Addressed This Visit             ICD-10-CM    Benign essential HTN I10    Relevant Medications    metoprolol succinate XL (Toprol-XL) 50 mg 24 hr tablet    lisinopril 40 mg tablet    atorvastatin (Lipitor) 80 mg tablet    Other Relevant Orders    Prostate Specific Antigen, Screen    Hemoglobin A1C    Comprehensive Metabolic Panel    TSH with reflex to Free T4 if abnormal    Vitamin D 25-Hydroxy,Total (for eval of Vitamin D levels)    CBC and Auto Differential    Gout M10.9    Relevant Medications    metoprolol succinate XL (Toprol-XL) 50 mg 24 hr tablet    atorvastatin (Lipitor) 80 mg tablet    allopurinol (Zyloprim) 100 mg tablet    Other Relevant Orders    Prostate Specific Antigen, Screen    Hemoglobin A1C    Comprehensive Metabolic Panel    TSH with reflex to Free T4 if abnormal    Vitamin D 25-Hydroxy,Total (for eval of Vitamin D levels)    CBC and Auto Differential    Seizure (Multi) R56.9    Relevant Medications    levETIRAcetam (Keppra) 750 mg tablet     Colonoscopy: defers, patient refuses  Follow up in 6 months to a year or sooner if needed

## 2024-05-15 DIAGNOSIS — R56.9 SEIZURE (MULTI): ICD-10-CM

## 2024-05-15 NOTE — TELEPHONE ENCOUNTER
Requested Prescriptions     Pending Prescriptions Disp Refills    levETIRAcetam (Keppra) 750 mg tablet 180 tablet 2     Sig: Take 1 tablet (750 mg) by mouth 2 times a day.

## 2024-05-16 RX ORDER — LEVETIRACETAM 750 MG/1
750 TABLET ORAL 2 TIMES DAILY
Qty: 180 TABLET | Refills: 2 | Status: SHIPPED | OUTPATIENT
Start: 2024-05-16

## 2024-09-28 ENCOUNTER — HOSPITAL ENCOUNTER (INPATIENT)
Facility: HOSPITAL | Age: 64
End: 2024-09-28
Attending: STUDENT IN AN ORGANIZED HEALTH CARE EDUCATION/TRAINING PROGRAM | Admitting: FAMILY MEDICINE
Payer: MEDICAID

## 2024-09-28 ENCOUNTER — APPOINTMENT (OUTPATIENT)
Dept: RADIOLOGY | Facility: HOSPITAL | Age: 64
End: 2024-09-28
Payer: MEDICAID

## 2024-09-28 ENCOUNTER — APPOINTMENT (OUTPATIENT)
Dept: CARDIOLOGY | Facility: HOSPITAL | Age: 64
End: 2024-09-28
Payer: MEDICAID

## 2024-09-28 DIAGNOSIS — I63.9 CEREBROVASCULAR ACCIDENT (CVA), UNSPECIFIED MECHANISM (MULTI): ICD-10-CM

## 2024-09-28 DIAGNOSIS — I25.10 CAD S/P PERCUTANEOUS CORONARY ANGIOPLASTY: ICD-10-CM

## 2024-09-28 DIAGNOSIS — Z98.61 CAD S/P PERCUTANEOUS CORONARY ANGIOPLASTY: ICD-10-CM

## 2024-09-28 DIAGNOSIS — I63.512: Primary | ICD-10-CM

## 2024-09-28 DIAGNOSIS — I25.5 ISCHEMIC CARDIOMYOPATHY: ICD-10-CM

## 2024-09-28 LAB
ALBUMIN SERPL BCP-MCNC: 4.6 G/DL (ref 3.4–5)
ALP SERPL-CCNC: 81 U/L (ref 33–136)
ALT SERPL W P-5'-P-CCNC: 19 U/L (ref 10–52)
ANION GAP SERPL CALC-SCNC: 18 MMOL/L (ref 10–20)
APPEARANCE UR: CLEAR
AST SERPL W P-5'-P-CCNC: 22 U/L (ref 9–39)
BASOPHILS # BLD AUTO: 0.03 X10*3/UL (ref 0–0.1)
BASOPHILS NFR BLD AUTO: 0.2 %
BILIRUB SERPL-MCNC: 1.3 MG/DL (ref 0–1.2)
BILIRUB UR STRIP.AUTO-MCNC: NEGATIVE MG/DL
BNP SERPL-MCNC: 376 PG/ML (ref 0–99)
BUN SERPL-MCNC: 19 MG/DL (ref 6–23)
CALCIUM SERPL-MCNC: 9.8 MG/DL (ref 8.6–10.3)
CARDIAC TROPONIN I PNL SERPL HS: 1045 NG/L (ref 0–20)
CARDIAC TROPONIN I PNL SERPL HS: 1218 NG/L (ref 0–20)
CARDIAC TROPONIN I PNL SERPL HS: 1384 NG/L (ref 0–20)
CHLORIDE SERPL-SCNC: 98 MMOL/L (ref 98–107)
CO2 SERPL-SCNC: 25 MMOL/L (ref 21–32)
COLOR UR: YELLOW
CREAT SERPL-MCNC: 1.18 MG/DL (ref 0.5–1.3)
EGFRCR SERPLBLD CKD-EPI 2021: 70 ML/MIN/1.73M*2
EOSINOPHIL # BLD AUTO: 0 X10*3/UL (ref 0–0.7)
EOSINOPHIL NFR BLD AUTO: 0 %
ERYTHROCYTE [DISTWIDTH] IN BLOOD BY AUTOMATED COUNT: 13.1 % (ref 11.5–14.5)
GLUCOSE BLD MANUAL STRIP-MCNC: 120 MG/DL (ref 74–99)
GLUCOSE SERPL-MCNC: 125 MG/DL (ref 74–99)
GLUCOSE UR STRIP.AUTO-MCNC: NORMAL MG/DL
HCT VFR BLD AUTO: 51.6 % (ref 41–52)
HGB BLD-MCNC: 17.2 G/DL (ref 13.5–17.5)
HYALINE CASTS #/AREA URNS AUTO: ABNORMAL /LPF
IMM GRANULOCYTES # BLD AUTO: 0.09 X10*3/UL (ref 0–0.7)
IMM GRANULOCYTES NFR BLD AUTO: 0.6 % (ref 0–0.9)
KETONES UR STRIP.AUTO-MCNC: ABNORMAL MG/DL
LEUKOCYTE ESTERASE UR QL STRIP.AUTO: NEGATIVE
LYMPHOCYTES # BLD AUTO: 0.93 X10*3/UL (ref 1.2–4.8)
LYMPHOCYTES NFR BLD AUTO: 5.8 %
MCH RBC QN AUTO: 26.4 PG (ref 26–34)
MCHC RBC AUTO-ENTMCNC: 33.3 G/DL (ref 32–36)
MCV RBC AUTO: 79 FL (ref 80–100)
MONOCYTES # BLD AUTO: 0.54 X10*3/UL (ref 0.1–1)
MONOCYTES NFR BLD AUTO: 3.4 %
MUCOUS THREADS #/AREA URNS AUTO: ABNORMAL /LPF
NEUTROPHILS # BLD AUTO: 14.34 X10*3/UL (ref 1.2–7.7)
NEUTROPHILS NFR BLD AUTO: 90 %
NITRITE UR QL STRIP.AUTO: NEGATIVE
NRBC BLD-RTO: 0 /100 WBCS (ref 0–0)
PH UR STRIP.AUTO: 5.5 [PH]
PLATELET # BLD AUTO: 259 X10*3/UL (ref 150–450)
POTASSIUM SERPL-SCNC: 3.9 MMOL/L (ref 3.5–5.3)
PROT SERPL-MCNC: 8 G/DL (ref 6.4–8.2)
PROT UR STRIP.AUTO-MCNC: ABNORMAL MG/DL
RBC # BLD AUTO: 6.52 X10*6/UL (ref 4.5–5.9)
RBC # UR STRIP.AUTO: NEGATIVE /UL
RBC #/AREA URNS AUTO: ABNORMAL /HPF
SARS-COV-2 RNA RESP QL NAA+PROBE: NOT DETECTED
SODIUM SERPL-SCNC: 137 MMOL/L (ref 136–145)
SP GR UR STRIP.AUTO: 1.02
SQUAMOUS #/AREA URNS AUTO: ABNORMAL /HPF
UROBILINOGEN UR STRIP.AUTO-MCNC: NORMAL MG/DL
WBC # BLD AUTO: 15.9 X10*3/UL (ref 4.4–11.3)
WBC #/AREA URNS AUTO: ABNORMAL /HPF

## 2024-09-28 PROCEDURE — 36415 COLL VENOUS BLD VENIPUNCTURE: CPT | Performed by: STUDENT IN AN ORGANIZED HEALTH CARE EDUCATION/TRAINING PROGRAM

## 2024-09-28 PROCEDURE — 85025 COMPLETE CBC W/AUTO DIFF WBC: CPT | Performed by: STUDENT IN AN ORGANIZED HEALTH CARE EDUCATION/TRAINING PROGRAM

## 2024-09-28 PROCEDURE — 82947 ASSAY GLUCOSE BLOOD QUANT: CPT

## 2024-09-28 PROCEDURE — 81001 URINALYSIS AUTO W/SCOPE: CPT | Performed by: STUDENT IN AN ORGANIZED HEALTH CARE EDUCATION/TRAINING PROGRAM

## 2024-09-28 PROCEDURE — 99223 1ST HOSP IP/OBS HIGH 75: CPT | Performed by: FAMILY MEDICINE

## 2024-09-28 PROCEDURE — 80053 COMPREHEN METABOLIC PANEL: CPT | Performed by: STUDENT IN AN ORGANIZED HEALTH CARE EDUCATION/TRAINING PROGRAM

## 2024-09-28 PROCEDURE — 87635 SARS-COV-2 COVID-19 AMP PRB: CPT | Performed by: STUDENT IN AN ORGANIZED HEALTH CARE EDUCATION/TRAINING PROGRAM

## 2024-09-28 PROCEDURE — 99285 EMERGENCY DEPT VISIT HI MDM: CPT

## 2024-09-28 PROCEDURE — 2060000001 HC INTERMEDIATE ICU ROOM DAILY

## 2024-09-28 PROCEDURE — 2500000005 HC RX 250 GENERAL PHARMACY W/O HCPCS: Performed by: FAMILY MEDICINE

## 2024-09-28 PROCEDURE — 70450 CT HEAD/BRAIN W/O DYE: CPT

## 2024-09-28 PROCEDURE — 71045 X-RAY EXAM CHEST 1 VIEW: CPT | Mod: FOREIGN READ | Performed by: RADIOLOGY

## 2024-09-28 PROCEDURE — 83880 ASSAY OF NATRIURETIC PEPTIDE: CPT | Performed by: FAMILY MEDICINE

## 2024-09-28 PROCEDURE — 36415 COLL VENOUS BLD VENIPUNCTURE: CPT | Performed by: FAMILY MEDICINE

## 2024-09-28 PROCEDURE — 70450 CT HEAD/BRAIN W/O DYE: CPT | Performed by: RADIOLOGY

## 2024-09-28 PROCEDURE — 93005 ELECTROCARDIOGRAM TRACING: CPT

## 2024-09-28 PROCEDURE — 84484 ASSAY OF TROPONIN QUANT: CPT | Performed by: STUDENT IN AN ORGANIZED HEALTH CARE EDUCATION/TRAINING PROGRAM

## 2024-09-28 PROCEDURE — 83036 HEMOGLOBIN GLYCOSYLATED A1C: CPT | Mod: GEALAB | Performed by: FAMILY MEDICINE

## 2024-09-28 PROCEDURE — 71045 X-RAY EXAM CHEST 1 VIEW: CPT

## 2024-09-28 PROCEDURE — 84484 ASSAY OF TROPONIN QUANT: CPT | Performed by: FAMILY MEDICINE

## 2024-09-28 PROCEDURE — 94760 N-INVAS EAR/PLS OXIMETRY 1: CPT

## 2024-09-28 PROCEDURE — 80177 DRUG SCRN QUAN LEVETIRACETAM: CPT | Mod: GEALAB | Performed by: FAMILY MEDICINE

## 2024-09-28 PROCEDURE — 2500000001 HC RX 250 WO HCPCS SELF ADMINISTERED DRUGS (ALT 637 FOR MEDICARE OP): Performed by: FAMILY MEDICINE

## 2024-09-28 RX ORDER — ASPIRIN 81 MG/1
81 TABLET ORAL DAILY
Status: DISCONTINUED | OUTPATIENT
Start: 2024-09-28 | End: 2024-10-02 | Stop reason: HOSPADM

## 2024-09-28 RX ORDER — POLYETHYLENE GLYCOL 3350 17 G/17G
17 POWDER, FOR SOLUTION ORAL DAILY
Status: DISCONTINUED | OUTPATIENT
Start: 2024-09-28 | End: 2024-10-02 | Stop reason: HOSPADM

## 2024-09-28 RX ORDER — LORAZEPAM 2 MG/ML
2 INJECTION INTRAMUSCULAR EVERY 4 HOURS PRN
Status: DISCONTINUED | OUTPATIENT
Start: 2024-09-28 | End: 2024-10-02 | Stop reason: HOSPADM

## 2024-09-28 RX ORDER — ACETAMINOPHEN 325 MG/1
650 TABLET ORAL EVERY 6 HOURS PRN
Status: DISCONTINUED | OUTPATIENT
Start: 2024-09-28 | End: 2024-10-02 | Stop reason: HOSPADM

## 2024-09-28 RX ORDER — NAPROXEN SODIUM 220 MG/1
81 TABLET, FILM COATED ORAL ONCE
Status: CANCELLED | OUTPATIENT
Start: 2024-09-28

## 2024-09-28 RX ORDER — ATORVASTATIN CALCIUM 80 MG/1
80 TABLET, FILM COATED ORAL NIGHTLY
Status: CANCELLED | OUTPATIENT
Start: 2024-09-28

## 2024-09-28 RX ORDER — CLOPIDOGREL BISULFATE 75 MG/1
300 TABLET ORAL ONCE
Status: COMPLETED | OUTPATIENT
Start: 2024-09-28 | End: 2024-09-28

## 2024-09-28 RX ORDER — ATORVASTATIN CALCIUM 80 MG/1
80 TABLET, FILM COATED ORAL NIGHTLY
Status: DISCONTINUED | OUTPATIENT
Start: 2024-09-28 | End: 2024-10-02 | Stop reason: HOSPADM

## 2024-09-28 RX ORDER — CLOPIDOGREL BISULFATE 75 MG/1
75 TABLET ORAL DAILY
Status: DISCONTINUED | OUTPATIENT
Start: 2024-09-29 | End: 2024-10-02 | Stop reason: HOSPADM

## 2024-09-28 RX ORDER — LABETALOL HYDROCHLORIDE 5 MG/ML
10 INJECTION, SOLUTION INTRAVENOUS EVERY 10 MIN PRN
Status: ACTIVE | OUTPATIENT
Start: 2024-09-28 | End: 2024-09-30

## 2024-09-28 RX ADMIN — CLOPIDOGREL 300 MG: 75 TABLET ORAL at 17:01

## 2024-09-28 RX ADMIN — ATORVASTATIN CALCIUM 80 MG: 80 TABLET, FILM COATED ORAL at 20:19

## 2024-09-28 RX ADMIN — Medication 2 L/MIN: at 20:27

## 2024-09-28 RX ADMIN — Medication 3 L/MIN: at 15:25

## 2024-09-28 RX ADMIN — ACETAMINOPHEN 650 MG: 325 TABLET ORAL at 15:07

## 2024-09-28 RX ADMIN — ASPIRIN 81 MG: 81 TABLET, COATED ORAL at 15:07

## 2024-09-28 SDOH — SOCIAL STABILITY: SOCIAL INSECURITY
WITHIN THE LAST YEAR, HAVE TO BEEN RAPED OR FORCED TO HAVE ANY KIND OF SEXUAL ACTIVITY BY YOUR PARTNER OR EX-PARTNER?: NO

## 2024-09-28 SDOH — SOCIAL STABILITY: SOCIAL INSECURITY: DO YOU FEEL ANYONE HAS EXPLOITED OR TAKEN ADVANTAGE OF YOU FINANCIALLY OR OF YOUR PERSONAL PROPERTY?: NO

## 2024-09-28 SDOH — ECONOMIC STABILITY: INCOME INSECURITY: IN THE PAST 12 MONTHS, HAS THE ELECTRIC, GAS, OIL, OR WATER COMPANY THREATENED TO SHUT OFF SERVICE IN YOUR HOME?: NO

## 2024-09-28 SDOH — SOCIAL STABILITY: SOCIAL INSECURITY: WITHIN THE LAST YEAR, HAVE YOU BEEN HUMILIATED OR EMOTIONALLY ABUSED IN OTHER WAYS BY YOUR PARTNER OR EX-PARTNER?: NO

## 2024-09-28 SDOH — SOCIAL STABILITY: SOCIAL INSECURITY
WITHIN THE LAST YEAR, HAVE YOU BEEN KICKED, HIT, SLAPPED, OR OTHERWISE PHYSICALLY HURT BY YOUR PARTNER OR EX-PARTNER?: NO

## 2024-09-28 SDOH — ECONOMIC STABILITY: FOOD INSECURITY: WITHIN THE PAST 12 MONTHS, THE FOOD YOU BOUGHT JUST DIDN'T LAST AND YOU DIDN'T HAVE MONEY TO GET MORE.: NEVER TRUE

## 2024-09-28 SDOH — SOCIAL STABILITY: SOCIAL INSECURITY: ARE THERE ANY APPARENT SIGNS OF INJURIES/BEHAVIORS THAT COULD BE RELATED TO ABUSE/NEGLECT?: NO

## 2024-09-28 SDOH — SOCIAL STABILITY: SOCIAL INSECURITY: DOES ANYONE TRY TO KEEP YOU FROM HAVING/CONTACTING OTHER FRIENDS OR DOING THINGS OUTSIDE YOUR HOME?: NO

## 2024-09-28 SDOH — SOCIAL STABILITY: SOCIAL INSECURITY: DO YOU FEEL UNSAFE GOING BACK TO THE PLACE WHERE YOU ARE LIVING?: NO

## 2024-09-28 SDOH — ECONOMIC STABILITY: FOOD INSECURITY: WITHIN THE PAST 12 MONTHS, YOU WORRIED THAT YOUR FOOD WOULD RUN OUT BEFORE YOU GOT MONEY TO BUY MORE.: NEVER TRUE

## 2024-09-28 SDOH — SOCIAL STABILITY: SOCIAL INSECURITY: HAVE YOU HAD THOUGHTS OF HARMING ANYONE ELSE?: YES

## 2024-09-28 SDOH — SOCIAL STABILITY: SOCIAL INSECURITY: WITHIN THE LAST YEAR, HAVE YOU BEEN AFRAID OF YOUR PARTNER OR EX-PARTNER?: NO

## 2024-09-28 SDOH — SOCIAL STABILITY: SOCIAL INSECURITY: ABUSE: ADULT

## 2024-09-28 SDOH — ECONOMIC STABILITY: HOUSING INSECURITY: IN THE PAST 12 MONTHS, HOW MANY TIMES HAVE YOU MOVED WHERE YOU WERE LIVING?: 14

## 2024-09-28 SDOH — SOCIAL STABILITY: SOCIAL INSECURITY: ARE YOU OR HAVE YOU BEEN THREATENED OR ABUSED PHYSICALLY, EMOTIONALLY, OR SEXUALLY BY ANYONE?: NO

## 2024-09-28 SDOH — SOCIAL STABILITY: SOCIAL INSECURITY: HAS ANYONE EVER THREATENED TO HURT YOUR FAMILY OR YOUR PETS?: NO

## 2024-09-28 SDOH — SOCIAL STABILITY: SOCIAL INSECURITY: HAVE YOU HAD ANY THOUGHTS OF HARMING ANYONE ELSE?: NO

## 2024-09-28 ASSESSMENT — COGNITIVE AND FUNCTIONAL STATUS - GENERAL
STANDING UP FROM CHAIR USING ARMS: A LOT
DRESSING REGULAR LOWER BODY CLOTHING: A LITTLE
DAILY ACTIVITIY SCORE: 18
MOBILITY SCORE: 17
CLIMB 3 TO 5 STEPS WITH RAILING: A LOT
MOVING TO AND FROM BED TO CHAIR: A LITTLE
STANDING UP FROM CHAIR USING ARMS: A LOT
TOILETING: A LITTLE
DRESSING REGULAR UPPER BODY CLOTHING: A LOT
HELP NEEDED FOR BATHING: A LOT
MOBILITY SCORE: 17
EATING MEALS: A LITTLE
PERSONAL GROOMING: A LITTLE
MOVING TO AND FROM BED TO CHAIR: A LITTLE
DRESSING REGULAR LOWER BODY CLOTHING: A LOT
DAILY ACTIVITIY SCORE: 14
DRESSING REGULAR UPPER BODY CLOTHING: A LITTLE
EATING MEALS: A LITTLE
HELP NEEDED FOR BATHING: A LITTLE
PATIENT BASELINE BEDBOUND: NO
WALKING IN HOSPITAL ROOM: A LOT
MOVING TO AND FROM BED TO CHAIR: A LITTLE
PERSONAL GROOMING: A LITTLE
TOILETING: A LOT
CLIMB 3 TO 5 STEPS WITH RAILING: A LOT
WALKING IN HOSPITAL ROOM: A LOT

## 2024-09-28 ASSESSMENT — LIFESTYLE VARIABLES
HOW MANY STANDARD DRINKS CONTAINING ALCOHOL DO YOU HAVE ON A TYPICAL DAY: PATIENT DOES NOT DRINK
EVER FELT BAD OR GUILTY ABOUT YOUR DRINKING: NO
AUDIT-C TOTAL SCORE: 1
SUBSTANCE_ABUSE_PAST_12_MONTHS: NO
HAVE PEOPLE ANNOYED YOU BY CRITICIZING YOUR DRINKING: NO
HAVE YOU EVER FELT YOU SHOULD CUT DOWN ON YOUR DRINKING: NO
HOW OFTEN DO YOU HAVE 6 OR MORE DRINKS ON ONE OCCASION: NEVER
EVER HAD A DRINK FIRST THING IN THE MORNING TO STEADY YOUR NERVES TO GET RID OF A HANGOVER: NO
SKIP TO QUESTIONS 9-10: 1
AUDIT-C TOTAL SCORE: 1
HOW OFTEN DO YOU HAVE A DRINK CONTAINING ALCOHOL: MONTHLY OR LESS
TOTAL SCORE: 0
PRESCIPTION_ABUSE_PAST_12_MONTHS: NO

## 2024-09-28 ASSESSMENT — ACTIVITIES OF DAILY LIVING (ADL)
WALKS IN HOME: NEEDS ASSISTANCE
PATIENT'S MEMORY ADEQUATE TO SAFELY COMPLETE DAILY ACTIVITIES?: UNABLE TO ASSESS
HEARING - RIGHT EAR: FUNCTIONAL
DRESSING YOURSELF: NEEDS ASSISTANCE
TOILETING: NEEDS ASSISTANCE
BATHING: NEEDS ASSISTANCE
HEARING - LEFT EAR: FUNCTIONAL
ADEQUATE_TO_COMPLETE_ADL: YES
FEEDING YOURSELF: NEEDS ASSISTANCE
JUDGMENT_ADEQUATE_SAFELY_COMPLETE_DAILY_ACTIVITIES: UNABLE TO ASSESS
LACK_OF_TRANSPORTATION: NO
GROOMING: NEEDS ASSISTANCE

## 2024-09-28 ASSESSMENT — PAIN DESCRIPTION - PROGRESSION: CLINICAL_PROGRESSION: NOT CHANGED

## 2024-09-28 ASSESSMENT — PAIN SCALES - GENERAL
PAINLEVEL_OUTOF10: 0 - NO PAIN
PAINLEVEL_OUTOF10: 0 - NO PAIN

## 2024-09-28 ASSESSMENT — PAIN - FUNCTIONAL ASSESSMENT: PAIN_FUNCTIONAL_ASSESSMENT: 0-10

## 2024-09-28 ASSESSMENT — PATIENT HEALTH QUESTIONNAIRE - PHQ9
SUM OF ALL RESPONSES TO PHQ9 QUESTIONS 1 & 2: 0
1. LITTLE INTEREST OR PLEASURE IN DOING THINGS: NOT AT ALL
2. FEELING DOWN, DEPRESSED OR HOPELESS: NOT AT ALL

## 2024-09-28 NOTE — H&P
History Of Present Illness  Jg Starr is a 64 y.o. male with history of seizure disorder, CVA, and ischemic cardiomyopathy who presented to the emergency room due to disturbances and expressive aphasia and was found to have large acute cortical infarction involving the medial aspect of the left temporal lobe, left parietal and left occipital lobes with associated acute thrombus involving the left MCA.  The patient has expressive aphasia and was a poor historian.   The son arrived at bedside during my evaluation who reported his fiancée saw the patient over the past month where the patient was living independently with normal speech.   The ER reported that EMS advised they were able to discuss the case with the neighbor at the scene who said the patient called them yesterday and a speech disturbance was noted.  The patient decided to attempt to sleep it off however this morning as the symptoms were still present he had the neighbor call the ambulance.  The neighbor is Shirin and her phoen # is [phone].  I have reached out and was unable to get a hold of her over the phone.  As an outpatient the patient is noted to have some memory loss and to be alert and orient x 2/3.      Past Medical History  Hypertension, coronary artery disease, CVA, gout, intracranial aneurysm, medical noncompliance, ischemic cardiomyopathy    Surgical History  PCI with stent placement     Social History  He reports that he has never smoked. He has never used smokeless tobacco. He reports that he does not drink alcohol and does not use drugs.    Family History  There is a history of diabetes, hypertension and stroke within the family     Allergies  Patient has no known allergies.    Review of Systems   Unable to perform ROS: Other   Due to expressive aphasia     Physical Exam  Constitutional:       Appearance: Normal appearance.   HENT:      Head: Normocephalic and atraumatic.      Nose: Nose normal.      Mouth/Throat:      Mouth:  Mucous membranes are moist.      Pharynx: Oropharynx is clear.   Eyes:      Pupils: Pupils are equal, round, and reactive to light.   Cardiovascular:      Rate and Rhythm: Normal rate and regular rhythm.   Pulmonary:      Effort: Pulmonary effort is normal.      Breath sounds: Normal breath sounds.   Abdominal:      General: Bowel sounds are normal.      Palpations: Abdomen is soft.   Musculoskeletal:      Cervical back: Normal range of motion.   Skin:     General: Skin is warm.      Capillary Refill: Capillary refill takes less than 2 seconds.   Neurological:      Mental Status: He is alert.      Comments: Right sided hemianopsia, ataxia bilateral upper extremities, expressive aphasia, retained sensation throughout, cannot name objects  NIH 6 at 1430   Psychiatric:      Comments: Frustrated due to expressive aphasia          Last Recorded Vitals  /78   Pulse (!) 104   Temp 37 °C (98.6 °F) (Temporal)   Resp 17   Wt 113 kg (249 lb 9 oz)   SpO2 97%          Assessment/Plan   Jg Starr is a 64 y.o. male with history of seizure disorder, CVA, and ischemic cardiomyopathy who presented to the emergency room due to disturbances and expressive aphasia and was found to have large acute cortical infarction involving the medial aspect of the left temporal lobe, left parietal and left occipital lobes with associated acute thrombus involving the left MCA.    large acute cortical infarction involving the medial aspect of the left temporal lobe, left parietal and left occipital lobes with associated acute thrombus involving the left MCA  Case discussed with neurology  Advised to keep a close eye on, if patient's symptoms worsen and the concern for cerebral edema will transfer to ICU and start mannitol  Will follow-up with neurochecks  Admit to stepdown unit  Continue aspirin Plavix and statin  Follow-up with MRI/MRI when available  Echocardiogram from 12/23 showed LVEF of 40 to 45%, apical septal segment and apex  are abnormal, and diastolic dysfunction  Neurology advised to repeat CT at noon tomorrow to evaluate for cerebral edema  PT OT consulted    Elevated troponin /coronary artery disease  Case discussed with cardiology  Given risk of hemorrhagic conversion neurology advised against anticoagulation  Cardiology agreed with neurology  Echocardiogram from 12/23 showed LVEF of 40 to 45%, apical septal segment and apex are abnormal, and diastolic dysfunction  Continue aspirin, Plavix and statin  Monitor on telemetry  Have consulted cardiology  Will repeat troponin and EKG    Leukocytosis  Chest x-ray showed no acute cardiopulmonary processes  C19 PCR negative  Likely related to stroke  Unsure vital signs and for other signs and symptoms of infection  Will monitor vital signs    Seizure disorder  Continue Keppra  Follow-up with Keppra level  Give Ativan as needed for seizure  Placed on seizure precautions    Hypertension  Give labetalol as needed for systolic greater than 220  Holding home medications to allow for permissive hypertension    Gout   Continue allopurinol    Prophylaxis  SCDs  Will hold pharmacologic prophylaxis due to risk of hemorrhagic conversion    Status  DNR as discussed with son at bedside    Addendum  In discussing cerebral edema and potential mannitol infusion with pharmacy they advised to use 20% solution of mannitol to give at 0.5 to 2 g/kg x 1, then may repeat 0.25 to 1 g/kg every 4-6 hours  Given formulary patient would need a central line and a dose of 100 g initially followed by 50 g every 4-6 hours          Dung Womack DO

## 2024-09-28 NOTE — CARE PLAN
The patient's goals for the shift include  pt nih remains the same or better throughout shift  Problem: Safety - Adult  Goal: Free from fall injury  Flowsheets (Taken 9/28/2024 1635)  Free from fall injury: Instruct family/caregiver on patient safety     Problem: Discharge Planning  Goal: Discharge to home or other facility with appropriate resources  Flowsheets (Taken 9/28/2024 1635)  Discharge to home or other facility with appropriate resources: Identify barriers to discharge with patient and caregiver     Problem: Chronic Conditions and Co-morbidities  Goal: Patient's chronic conditions and co-morbidity symptoms are monitored and maintained or improved  Outcome: Progressing  Flowsheets (Taken 9/28/2024 1634)  Care Plan - Patient's Chronic Conditions and Co-Morbidity Symptoms are Monitored and Maintained or Improved: Monitor and assess patient's chronic conditions and comorbid symptoms for stability, deterioration, or improvement     Problem: General Stroke  Goal: Demonstrate improvement in neurological exam throughout the shift  Outcome: Progressing  Goal: Participate in treatment (ie., meds, therapy) throughout shift  Outcome: Progressing  Goal: No symptoms of aspiration throughout shift  Outcome: Progressing  Goal: No symptoms of hemorrhage throughout shift  Outcome: Progressing  Goal: Decreased nausea/vomiting throughout shift  Outcome: Progressing  Goal: Controlled blood glucose throughout shift  Outcome: Progressing

## 2024-09-28 NOTE — ED PROVIDER NOTES
HPI   Chief Complaint   Patient presents with    Altered Mental Status       Patient is a 64-year-old male presenting for strokelike symptoms.  The patient was picked up by EMS after worsening fatigue and confusion.  Patient stated to the EMS with bystander on premises that last known well was yesterday morning.  EMS had an appropriate glucose.  They noticed loss of right visual field and mild aphasia.  He does have a previous CVA but cannot recall deficits from that.              Patient History   No past medical history on file.  No past surgical history on file.  No family history on file.  Social History     Tobacco Use    Smoking status: Never    Smokeless tobacco: Never   Vaping Use    Vaping status: Never Used   Substance Use Topics    Alcohol use: Never    Drug use: Never       Physical Exam   ED Triage Vitals [09/28/24 1140]   Temperature Heart Rate Respirations BP   37 °C (98.6 °F) 95 17 143/73      Pulse Ox Temp Source Heart Rate Source Patient Position   (!) 93 % Temporal Monitor Lying      BP Location FiO2 (%)     Right arm --       Physical Exam  Constitutional:       General: He is not in acute distress.     Appearance: He is not toxic-appearing.   Cardiovascular:      Rate and Rhythm: Normal rate and regular rhythm.   Pulmonary:      Breath sounds: Normal breath sounds. No wheezing, rhonchi or rales.   Abdominal:      Palpations: Abdomen is soft.      Tenderness: There is no abdominal tenderness. There is no guarding or rebound.   Neurological:      Mental Status: He is alert.      Comments: NIH of 4 for right sided bilateral hemianopsia and mild expressive aphasia.  No significant dysarthria or weakness.  Van negative           ED Course & MDM   ED Course as of 09/28/24 1306   Sat Sep 28, 2024   1149 EKG interpreted as sinus rhythm at a rate of 92 bpm, no ST elevation or depression consistent with ischemia however the patient does have flattening of the lateral T waves, QTc of 450 [AV]      ED  Course User Index  [AV] Alex Charles MD         Diagnoses as of 09/28/24 1306   Acute stroke due to occlusion of left middle cerebral artery (Multi)                 No data recorded     [place] Coma Scale Score: 14 (09/28/24 1223 : Verenice Gama RN)       NIH Stroke Scale: 5 (09/28/24 1223 : Verenice Gama RN)                   Medical Decision Making  Patient is a 64-year-old male presenting for strokelike symptoms.  Given the timeframe and last known well greater than 24 hours is outside of the window for TNK or embolectomy and so was not activated as a brain attack.  The patient was sent for CT and this did however demonstrate a left MCA occlusion with acute infarction.  The patient was informed of this result and I did recommend admission.  He is agreeable to this at this time.    I did speak with the on-call neurologist who suggested probable poor neurologic outcome however no need for transfer at this time given lack of intervention available.    The patient's troponin was significantly bated which I think is neuro in nature given the lack of chest pain or shortness of breath.        Procedure  Procedures     Alex Charles MD  09/28/24 0886

## 2024-09-29 ENCOUNTER — APPOINTMENT (OUTPATIENT)
Dept: RADIOLOGY | Facility: HOSPITAL | Age: 64
End: 2024-09-29
Payer: MEDICAID

## 2024-09-29 VITALS
WEIGHT: 249.56 LBS | SYSTOLIC BLOOD PRESSURE: 152 MMHG | RESPIRATION RATE: 18 BRPM | TEMPERATURE: 99.5 F | OXYGEN SATURATION: 92 % | HEIGHT: 72 IN | DIASTOLIC BLOOD PRESSURE: 88 MMHG | HEART RATE: 77 BPM | BODY MASS INDEX: 33.8 KG/M2

## 2024-09-29 LAB
CHOLEST SERPL-MCNC: 99 MG/DL (ref 0–199)
CHOLESTEROL/HDL RATIO: 2.9
ERYTHROCYTE [DISTWIDTH] IN BLOOD BY AUTOMATED COUNT: 13.2 % (ref 11.5–14.5)
EST. AVERAGE GLUCOSE BLD GHB EST-MCNC: 123 MG/DL
GLUCOSE BLD MANUAL STRIP-MCNC: 109 MG/DL (ref 74–99)
GLUCOSE BLD MANUAL STRIP-MCNC: 122 MG/DL (ref 74–99)
GLUCOSE BLD MANUAL STRIP-MCNC: 138 MG/DL (ref 74–99)
GLUCOSE BLD MANUAL STRIP-MCNC: 98 MG/DL (ref 74–99)
HBA1C MFR BLD: 5.9 %
HCT VFR BLD AUTO: 48.6 % (ref 41–52)
HDLC SERPL-MCNC: 33.9 MG/DL
HGB BLD-MCNC: 16 G/DL (ref 13.5–17.5)
HOLD SPECIMEN: NORMAL
LDLC SERPL CALC-MCNC: 42 MG/DL
LEVETIRACETAM SERPL-MCNC: 10 UG/ML (ref 10–40)
MCH RBC QN AUTO: 26.6 PG (ref 26–34)
MCHC RBC AUTO-ENTMCNC: 32.9 G/DL (ref 32–36)
MCV RBC AUTO: 81 FL (ref 80–100)
NON HDL CHOLESTEROL: 65 MG/DL (ref 0–149)
NRBC BLD-RTO: 0 /100 WBCS (ref 0–0)
PLATELET # BLD AUTO: 228 X10*3/UL (ref 150–450)
RBC # BLD AUTO: 6.02 X10*6/UL (ref 4.5–5.9)
TRIGL SERPL-MCNC: 114 MG/DL (ref 0–149)
VLDL: 23 MG/DL (ref 0–40)
WBC # BLD AUTO: 13.3 X10*3/UL (ref 4.4–11.3)

## 2024-09-29 PROCEDURE — 80061 LIPID PANEL: CPT | Performed by: FAMILY MEDICINE

## 2024-09-29 PROCEDURE — 36415 COLL VENOUS BLD VENIPUNCTURE: CPT | Performed by: FAMILY MEDICINE

## 2024-09-29 PROCEDURE — 85027 COMPLETE CBC AUTOMATED: CPT | Performed by: FAMILY MEDICINE

## 2024-09-29 PROCEDURE — 70544 MR ANGIOGRAPHY HEAD W/O DYE: CPT

## 2024-09-29 PROCEDURE — 99254 IP/OBS CNSLTJ NEW/EST MOD 60: CPT | Performed by: PSYCHIATRY & NEUROLOGY

## 2024-09-29 PROCEDURE — 99222 1ST HOSP IP/OBS MODERATE 55: CPT | Performed by: NURSE PRACTITIONER

## 2024-09-29 PROCEDURE — 82947 ASSAY GLUCOSE BLOOD QUANT: CPT

## 2024-09-29 PROCEDURE — 2060000001 HC INTERMEDIATE ICU ROOM DAILY

## 2024-09-29 PROCEDURE — 2500000004 HC RX 250 GENERAL PHARMACY W/ HCPCS (ALT 636 FOR OP/ED): Performed by: FAMILY MEDICINE

## 2024-09-29 PROCEDURE — 99233 SBSQ HOSP IP/OBS HIGH 50: CPT | Performed by: FAMILY MEDICINE

## 2024-09-29 PROCEDURE — 94760 N-INVAS EAR/PLS OXIMETRY 1: CPT

## 2024-09-29 PROCEDURE — 70450 CT HEAD/BRAIN W/O DYE: CPT

## 2024-09-29 PROCEDURE — 70450 CT HEAD/BRAIN W/O DYE: CPT | Performed by: RADIOLOGY

## 2024-09-29 PROCEDURE — 2500000001 HC RX 250 WO HCPCS SELF ADMINISTERED DRUGS (ALT 637 FOR MEDICARE OP): Performed by: FAMILY MEDICINE

## 2024-09-29 PROCEDURE — 70551 MRI BRAIN STEM W/O DYE: CPT

## 2024-09-29 PROCEDURE — 97162 PT EVAL MOD COMPLEX 30 MIN: CPT | Mod: GP

## 2024-09-29 PROCEDURE — 70547 MR ANGIOGRAPHY NECK W/O DYE: CPT

## 2024-09-29 RX ADMIN — ATORVASTATIN CALCIUM 80 MG: 80 TABLET, FILM COATED ORAL at 20:42

## 2024-09-29 RX ADMIN — CLOPIDOGREL 75 MG: 75 TABLET ORAL at 08:49

## 2024-09-29 RX ADMIN — ASPIRIN 81 MG: 81 TABLET, COATED ORAL at 08:49

## 2024-09-29 RX ADMIN — POLYETHYLENE GLYCOL 3350 17 G: 17 POWDER, FOR SOLUTION ORAL at 08:49

## 2024-09-29 ASSESSMENT — PAIN - FUNCTIONAL ASSESSMENT
PAIN_FUNCTIONAL_ASSESSMENT: 0-10

## 2024-09-29 ASSESSMENT — COGNITIVE AND FUNCTIONAL STATUS - GENERAL
TOILETING: A LITTLE
MOVING TO AND FROM BED TO CHAIR: A LITTLE
MOBILITY SCORE: 17
MOVING TO AND FROM BED TO CHAIR: A LITTLE
MOBILITY SCORE: 20
WALKING IN HOSPITAL ROOM: A LITTLE
DRESSING REGULAR LOWER BODY CLOTHING: A LITTLE
CLIMB 3 TO 5 STEPS WITH RAILING: A LITTLE
DRESSING REGULAR UPPER BODY CLOTHING: A LITTLE
STANDING UP FROM CHAIR USING ARMS: A LITTLE
PERSONAL GROOMING: A LITTLE
MOBILITY SCORE: 17
WALKING IN HOSPITAL ROOM: A LOT
CLIMB 3 TO 5 STEPS WITH RAILING: A LOT
STANDING UP FROM CHAIR USING ARMS: A LITTLE
MOVING TO AND FROM BED TO CHAIR: A LITTLE
MOVING FROM LYING ON BACK TO SITTING ON SIDE OF FLAT BED WITH BEDRAILS: A LITTLE
DRESSING REGULAR UPPER BODY CLOTHING: A LITTLE
STANDING UP FROM CHAIR USING ARMS: A LITTLE
DRESSING REGULAR UPPER BODY CLOTHING: A LITTLE
CLIMB 3 TO 5 STEPS WITH RAILING: A LOT
EATING MEALS: A LITTLE
DRESSING REGULAR LOWER BODY CLOTHING: A LITTLE
DAILY ACTIVITIY SCORE: 18
HELP NEEDED FOR BATHING: A LITTLE
TOILETING: A LITTLE
TURNING FROM BACK TO SIDE WHILE IN FLAT BAD: A LITTLE
MOBILITY SCORE: 21
CLIMB 3 TO 5 STEPS WITH RAILING: A LITTLE
EATING MEALS: A LITTLE
HELP NEEDED FOR BATHING: A LITTLE
DAILY ACTIVITIY SCORE: 19
WALKING IN HOSPITAL ROOM: A LITTLE
HELP NEEDED FOR BATHING: A LITTLE
DRESSING REGULAR LOWER BODY CLOTHING: A LITTLE
STANDING UP FROM CHAIR USING ARMS: A LOT
TOILETING: A LITTLE
WALKING IN HOSPITAL ROOM: A LITTLE
DAILY ACTIVITIY SCORE: 20

## 2024-09-29 ASSESSMENT — ENCOUNTER SYMPTOMS
ENDOCRINE NEGATIVE: 1
CARDIOVASCULAR NEGATIVE: 1
GASTROINTESTINAL NEGATIVE: 1
MYALGIAS: 1
CONSTITUTIONAL NEGATIVE: 1
NEUROLOGICAL NEGATIVE: 1
EYES NEGATIVE: 1
RESPIRATORY NEGATIVE: 1
HEMATOLOGIC/LYMPHATIC NEGATIVE: 1

## 2024-09-29 ASSESSMENT — PAIN SCALES - GENERAL
PAINLEVEL_OUTOF10: 0 - NO PAIN

## 2024-09-29 NOTE — PROGRESS NOTES
Physical Therapy                 Therapy Communication Note    Patient Name: Jg Starr  MRN: [id number]  Department: [address]  Room: [address] [place] [address]  Today's Date: 9/29/2024     Discipline: Physical Therapy    Missed Visit Reason: Missed Visit Reason: Patient in a medical procedure (Spoke with Pt's RN to clear Pt for PT, RN states that Pt is off of the floor for a CAT scan and an MRI. No evaluation)    Missed Time: Attempt    Comment:
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
## 2024-09-29 NOTE — PROGRESS NOTES
Jg Starr is a 64 y.o. male on day 1 of admission presenting with Acute stroke due to occlusion of left middle cerebral artery (Multi).      Subjective   Patient reports slight improvement in speech       Objective     Last Recorded Vitals  /78 (BP Location: Left arm, Patient Position: Lying)   Pulse 80   Temp 36.4 °C (97.5 °F) (Temporal)   Resp 18   Wt 113 kg (249 lb 9 oz)   SpO2 94%   Intake/Output last 3 Shifts:    Intake/Output Summary (Last 24 hours) at 9/29/2024 1055  Last data filed at 9/29/2024 0945  Gross per 24 hour   Intake 120 ml   Output 650 ml   Net -530 ml       Admission Weight  Weight: 113 kg (249 lb 9 oz) (09/28/24 1140)    Daily Weight  09/28/24 : 113 kg (249 lb 9 oz)    Image Results  CT head wo IV contrast  Narrative: Interpreted By:  Cynthia Barrios,   STUDY:  CT HEAD WO IV CONTRAST;  9/28/2024 12:09 pm      INDICATION:  Signs/Symptoms:Expressive aphasia and right-sided bilateral  hemianopsia.      COMPARISON:  None.      ACCESSION NUMBER(S):  [id number]      ORDERING CLINICIAN:  DAYAMI ANDREA      TECHNIQUE:  Noncontrast axial CT scan of head was performed. Angled reformats in  brain and bone windows were generated. The images were reviewed in  bone, brain, blood and soft tissue windows.      FINDINGS:  CSF Spaces: The ventricles, sulci and cisterns are within normal  limits for patient's age.  There is no extraaxial fluid collection.      Parenchyma: Moderate size area of encephalomalacia change involving  the left parietal lobe cortex suggestive of remote infarction. There  is loss of gray-white differentiation involving medial aspect of the  temporal lobe, posterior aspect of the left temporal lobe, medial  aspect of the parietal and occipital lobes with associated mass  effect suggestive of acute and/or subacute infarction in the  distribution of the left MCA. There is hyperdensity involving  proximal segment of the left MCA suggestive of acute thrombosis.  No  significant midline shift. No intraventricular hemorrhage. No  hemorrhagic transformation.      Calvarium: The calvarium is unremarkable.      Paranasal sinuses and mastoids: Visualized paranasal sinuses and  mastoids are clear.      Impression: 1. Large area of acute cortical infarction involving medial aspect of  the left temporal lobe, left parietal and left occipital lobes with  associated acute thrombosis involving the left MCA.      SUPPLEMENTAL INFORMATION:  Notifi message was left for DAYAMI ANDREA regarding this exam by Dr. Barrios on 9/28/2024 at approximately 12:29 hours.      MACRO:  Cynthia Barrios discussed the significance and urgency of this critical  finding by telephone with  DAYAMI ANDREA on 9/28/2024 at 12:28 pm.  (**-RCF-**) Findings:  See findings.          Signed by: Cynthia Barrios 9/28/2024 12:31 PM  Dictation workstation:   [id number]  XR chest 1 view  Narrative: STUDY:  Chest Radiograph;  09/28/2024, 12:11PM  INDICATION:  Stroke like symptoms.  COMPARISON:  12/08/2023 CT chest  ACCESSION NUMBER(S):  [id number]  ORDERING CLINICIAN:  DAYAMI ANDREA  TECHNIQUE:  Frontal chest was obtained at 12:11 hours.  FINDINGS:  CARDIOMEDIASTINAL SILHOUETTE:  Cardiomediastinal silhouette is normal in size and configuration.     LUNGS:  Lungs are clear.     ABDOMEN:  No remarkable upper abdominal findings.     BONES:  No acute osseous changes.  Impression: No radiographic evidence of acute cardiopulmonary disease.  Signed by Eric Beckwith MD      Physical Exam    Gen: lying comfortably in bed, not in acute distress  HEENT: atraumatic, normocephalic  Pulm: normal respiratory effort, clear to auscultation b/l  Cardiac: RRR, no murmurs noted, normal S1/S2  GI: Soft, nontender, BS+  MSK: normal ROM without joint swelling  Extremities: no LE edema, cyanosis  Neuro:: Expressive aphasia present, has difficulty naming items however his speech is fluid, as continued right sided hemianopsia improving ataxia,  sensation throughout, no dysarthria  Psych: calm and appropriate for situation        Assessment/Plan      Jg Starr is a 64 y.o. male with history of seizure disorder, CVA, and ischemic cardiomyopathy who presented to the emergency room due to disturbances and expressive aphasia and was found to have large acute cortical infarction involving the medial aspect of the left temporal lobe, left parietal and left occipital lobes with associated acute thrombus involving the left MCA.     large acute cortical infarction involving the medial aspect of the left temporal lobe, left parietal and left occipital lobes with associated acute thrombus involving the left MCA  5 brain reveals acute to early subacute infarction of the left posterior cerebral artery territory involving the left occipital lobe and medial left temporal lobe, and also revealed areas of hemosiderin deposition anterior dystrophic calcification/mineralization  MRA reveals signal drop-off along with distal M1/M2 segments of the right middle cerebral artery  Continue neurochecks  Continue aspirin Plavix and statin  Echocardiogram from 12/23 showed LVEF of 40 to 45%, apical septal segment and apex are abnormal, and diastolic dysfunction  Neurology advised to repeat CT at day to evaluate for cerebral edema  PT OT consulted  hA1c 5.9  Neurology advised the hemosiderin deposition is consistent with cerebral amyloid angiopathy and advised against anticoagulation in the foreseeable future further advised to continue DAPT     Elevated troponin /coronary artery disease  Case discussed with cardiology  Given risk of hemorrhagic conversion neurology advised against anticoagulation  Cardiology agreed with neurology  Echocardiogram from 12/23 showed LVEF of 40 to 45%, apical septal segment and apex are abnormal, and diastolic dysfunction  Cardiology advised for repeat echo  Continue aspirin, Plavix and statin  Monitor on telemetry  Phone trending down      Leukocytosis  Chest x-ray showed no acute cardiopulmonary processes  C19 PCR negative  Likely related to stroke  Unsure vital signs and for other signs and symptoms of infection  Will monitor vital signs     Seizure disorder  Continue Keppra  Keppra level 10  Give Ativan as needed for seizure  Placed on seizure precautions     Hypertension  Give labetalol as needed for systolic greater than 220  Holding home medications to allow for permissive hypertension     Gout   Continue allopurinol     Prophylaxis  SCDs  Will hold pharmacologic prophylaxis due to risk of hemorrhagic conversion     Status  DNR as discussed with son at bedside     Addendum  Case discussed with neurology  Advised to repeat CT of head tomorrow for further follow-up on cerebral edema and continue hospitalization until Tuesday.  From neurology standpoint if patient remains stable on Tuesday he can be discharged      Dung Womack DO

## 2024-09-29 NOTE — PROGRESS NOTES
09/29/24 1438   Discharge Planning   Living Arrangements Alone   Support Systems Children   Assistance Needed A&Ox3, independent with ADLs, no DME, room air at baseline, drives. PCP Angelica Dalal APRN-CNP   Type of Residence Private residence   Do you have animals or pets at home? No   Expected Discharge Disposition   (TBD, pending PT/OT evals. Pt states his son will be staying with him at discharge if needed.)   Does the patient need discharge transport arranged? No

## 2024-09-29 NOTE — ASSESSMENT & PLAN NOTE
Impression: Recurrent left MCA ischemic stroke (has a previous left parietal lobe ischemic stroke).  Neurologically, his prognosis is poor, due to his expressive aphasia, right visual loss or neglect, and impaired higher cortical function (motor apraxia).  I recommend waiting at least 7 days to add anticoagulation, such as with Eliquis (if requested by Cardiology).  Possibly his recent acute left MCA ischemic stroke is related to a cardio-embolic etiology.

## 2024-09-29 NOTE — CARE PLAN
The patient's goals for the shift include  to remain oriented throughout shift.     The clinical goals for the shift include pt will have no neuro decline through night

## 2024-09-29 NOTE — CONSULTS
Consults  History Of Present Illness:    Jg Starr is a very pleasant 64 year old gentleman with a history of seizure disorder, CVA(12/2022), ischemic cardiomyopathy (LVEF 45%), HTN, intracranial aneurysm, HLD and CAD s/p PCI to LAD (4/2022), presented to the ER with a change in mental status. He is a poor historian. His neighbor noticed he had a speech disturbance yesterday. He is alert and oriented 2-3. He denies chest pain, shortness of breath or heart palpitations. He states he continues to feel better.     Review of Systems   Constitutional: Negative.   HENT: Negative.     Eyes: Negative.    Cardiovascular: Negative.    Respiratory: Negative.     Endocrine: Negative.    Hematologic/Lymphatic: Negative.    Skin: Negative.    Musculoskeletal:  Positive for myalgias.   Gastrointestinal: Negative.    Neurological: Negative.          Last Recorded Vitals:  Vitals:    09/29/24 0000 09/29/24 0400 09/29/24 0445 09/29/24 0834   BP: (!) 154/97 123/65     BP Location:  Left arm     Patient Position:  Lying     Pulse: 81 75     Resp:       Temp:  36.4 °C (97.5 °F)     TempSrc:  Temporal     SpO2:  90% 93% 97%   Weight:       Height:           Last Labs:  CBC - 9/28/2024: 11:53 AM  15.9 17.2 259    51.6      CMP - 9/28/2024: 11:53 AM  9.8 8.0 22 --- 1.3   3.9 4.6 19 81      PTT - No results in last year.  1.1   12.3 _     Troponin I, High Sensitivity   Date/Time Value Ref Range Status   09/28/2024 08:06 PM 1,045 (HH) 0 - 20 ng/L Final     Comment:     Previous result verified on 9/28/2024 1239 on specimen/case [id number] called with component TRPHS for procedure Troponin I, High Sensitivity with value 1,218 ng/L.   09/28/2024 03:43 PM 1,384 (HH) 0 - 20 ng/L Final     Comment:     Previous result verified on 9/28/2024 1239 on specimen/case [id number] called with component TRPHS for procedure Troponin I, High Sensitivity with value 1,218 ng/L.   09/28/2024 11:53 AM 1,218 () 0 - 20 ng/L Final     BNP    Date/Time Value Ref Range Status   09/28/2024 03:07  (H) 0 - 99 pg/mL Final   12/08/2023 07:26  (H) 0 - 99 pg/mL Final     Hemoglobin A1C   Date/Time Value Ref Range Status   09/28/2024 03:07 PM 5.9 (H) See comment % Final   12/09/2023 06:01 AM 6.3 (H) see below % Final     LDL Calculated   Date/Time Value Ref Range Status   09/29/2024 05:30 AM 42 <=99 mg/dL Final     Comment:                                 Near   Borderline      AGE      Desirable  Optimal    High     High     Very High     0-19 Y     0 - 109     ---    110-129   >/= 130     ----    20-24 Y     0 - 119     ---    120-159   >/= 160     ----      >24 Y     0 -  99   100-129  130-159   160-189     >/=190     12/09/2023 06:01 AM 61 <=99 mg/dL Final     Comment:                                 Near   Borderline      AGE      Desirable  Optimal    High     High     Very High     0-19 Y     0 - 109     ---    110-129   >/= 130     ----    20-24 Y     0 - 119     ---    120-159   >/= 160     ----      >24 Y     0 -  99   100-129  130-159   160-189     >/=190       VLDL   Date/Time Value Ref Range Status   09/29/2024 05:30 AM 23 0 - 40 mg/dL Final   12/09/2023 06:01 AM 21 0 - 40 mg/dL Final      Last I/O:  I/O last 3 completed shifts:  In: - (0 mL/kg)   Out: 650 (5.7 mL/kg) [Urine:650 (0.2 mL/kg/hr)]  Weight: 113.2 kg     Past Cardiology Tests (Last 3 Years):  EKG:    Echo:  Transthoracic Echo (TTE) Complete 12/11/2023   1. Left ventricular systolic function is normal with a 40-45% estimated ejection fraction.   2. Apical septal segment and apex are abnormal.   3. Spectral Doppler shows a pseudonormal pattern of left ventricular diastolic filling.   4. Mild aortic valve regurgitation.   5. There is no evidence of a patent foramen ovale.    Echocardiogram 12/14/2022  1. Left ventricular systolic function is mildly decreased with a 45% estimated ejection fraction.   2. Apical septal segment, apical anterior segment, and apex are abnormal.   3.  Spectral Doppler shows an impaired relaxation pattern of left ventricular diastolic filling.   4. Mild to moderate aortic valve regurgitation.   5. There is mild mitral valve regurgitation.   6. There is no evidence of a patent foramen ovale.  Ejection Fractions:  EF   Date/Time Value Ref Range Status   12/11/2023 11:43 AM 42       Cath:    Stress Test:    Cardiac Imaging:      Past Medical History:  He has no past medical history on file.    Past Surgical History:  He has no past surgical history on file.      Social History:  He reports that he has never smoked. He has never used smokeless tobacco. He reports that he does not drink alcohol and does not use drugs.    Family History:  No family history on file.     Allergies:  Patient has no known allergies.    Inpatient Medications:  Scheduled medications   Medication Dose Route Frequency    aspirin  81 mg oral Daily    atorvastatin  80 mg oral Nightly    clopidogrel  75 mg oral Daily    polyethylene glycol  17 g oral Daily     PRN medications   Medication    acetaminophen    labetaloL    LORazepam    oxygen     Continuous Medications   Medication Dose Last Rate     Outpatient Medications:  Current Outpatient Medications   Medication Instructions    allopurinol (ZYLOPRIM) 100 mg, oral, Daily    aspirin 81 mg chewable tablet No dose, route, or frequency recorded.    atorvastatin (LIPITOR) 80 mg, oral, Nightly    diclofenac sodium (Voltaren) 1 % gel gel 1 Application, Topical, 4 times daily PRN    levETIRAcetam (KEPPRA) 750 mg, oral, 2 times daily    lisinopril 40 mg, oral, Daily    metoprolol succinate XL (TOPROL-XL) 50 mg, oral, Daily    sodium chloride (Ocean) 0.65 % nasal spray 1 spray, Each Nostril, 4 times daily PRN       Physical Exam:  Physical Exam  Vitals reviewed.   HENT:      Head: Normocephalic.      Nose: Nose normal.   Eyes:      Pupils: Pupils are equal, round, and reactive to light.   Cardiovascular:      Rate and Rhythm: Normal rate and regular  rhythm.   Pulmonary:      Effort: Pulmonary effort is normal.      Breath sounds: Normal breath sounds.   Abdominal:      General: Abdomen is flat.      Palpations: Abdomen is soft.   Musculoskeletal:         General: Normal range of motion.      Cervical back: Normal range of motion.   Skin:     General: Skin is warm and dry.   Neurological:      General: No focal deficit present.      Mental Status: He is alert and oriented to person, place, and time.   Psychiatric:         Mood and Affect: Mood normal.          Assessment/Plan   Mr. Starr is a very pleasant 64 year old gentleman with a history of CVA, seizure disorder, ischemic cardiomyopathy(LVEF 45%), HTN, HLD, CAD s/p PCI to LAD (4/2022), presented to the ER with a change in mental status. He denies chest pian or shortness of breath. CT scan showed large acute cortical infarction involving the medial aspect of the left temporal lobe,left parietal and left occipital lobes with associated acute thrombus involving the left MCA. Neurology advised against anticoagulation at this time. Troponin is elevated secondary to demand ischemia.     Plan   -continuous heart monitor   -continue Aspirin, Plavix and Atorvastatin   -echocardiogram   -neurology following   Peripheral IV 09/28/24 20 G Distal;Right;Upper;Anterior Arm (Active)   Site Assessment Clean;Dry;Intact 09/29/24 0900   Dressing Status Clean;Dry;Occlusive 09/29/24 0900   Number of days: 1       Code Status:  DNR    I spent minutes in the professional and overall care of this patient.        Emily Ortiz, APRN-CNP

## 2024-09-30 ENCOUNTER — DOCUMENTATION (OUTPATIENT)
Dept: HOME HEALTH SERVICES | Facility: HOME HEALTH | Age: 64
End: 2024-09-30

## 2024-09-30 ENCOUNTER — APPOINTMENT (OUTPATIENT)
Dept: CARDIOLOGY | Facility: HOSPITAL | Age: 64
End: 2024-09-30
Payer: MEDICAID

## 2024-09-30 ENCOUNTER — APPOINTMENT (OUTPATIENT)
Dept: RADIOLOGY | Facility: HOSPITAL | Age: 64
End: 2024-09-30
Payer: MEDICAID

## 2024-09-30 PROBLEM — I63.512: Status: RESOLVED | Noted: 2024-09-28 | Resolved: 2024-09-30

## 2024-09-30 LAB
ALBUMIN SERPL BCP-MCNC: 3.9 G/DL (ref 3.4–5)
ANION GAP SERPL CALC-SCNC: 15 MMOL/L (ref 10–20)
AORTIC VALVE MEAN GRADIENT: 4.8 MMHG
AORTIC VALVE PEAK VELOCITY: 1.42 M/S
ATRIAL RATE: 92 BPM
ATRIAL RATE: 97 BPM
AV PEAK GRADIENT: 8.1 MMHG
AVA (PEAK VEL): 2.03 CM2
AVA (VTI): 2.37 CM2
BUN SERPL-MCNC: 20 MG/DL (ref 6–23)
CALCIUM SERPL-MCNC: 8.7 MG/DL (ref 8.6–10.3)
CHLORIDE SERPL-SCNC: 100 MMOL/L (ref 98–107)
CO2 SERPL-SCNC: 25 MMOL/L (ref 21–32)
CREAT SERPL-MCNC: 0.89 MG/DL (ref 0.5–1.3)
EGFRCR SERPLBLD CKD-EPI 2021: >90 ML/MIN/1.73M*2
EJECTION FRACTION APICAL 4 CHAMBER: 57.3
EJECTION FRACTION: 57 %
ERYTHROCYTE [DISTWIDTH] IN BLOOD BY AUTOMATED COUNT: 13.2 % (ref 11.5–14.5)
GLUCOSE BLD MANUAL STRIP-MCNC: 113 MG/DL (ref 74–99)
GLUCOSE BLD MANUAL STRIP-MCNC: 114 MG/DL (ref 74–99)
GLUCOSE BLD MANUAL STRIP-MCNC: 119 MG/DL (ref 74–99)
GLUCOSE BLD MANUAL STRIP-MCNC: 125 MG/DL (ref 74–99)
GLUCOSE BLD MANUAL STRIP-MCNC: 131 MG/DL (ref 74–99)
GLUCOSE SERPL-MCNC: 98 MG/DL (ref 74–99)
HCT VFR BLD AUTO: 50 % (ref 41–52)
HGB BLD-MCNC: 16.3 G/DL (ref 13.5–17.5)
LEFT ATRIUM VOLUME AREA LENGTH INDEX BSA: 19.6 ML/M2
LEFT VENTRICLE INTERNAL DIMENSION DIASTOLE: 3.74 CM (ref 3.5–6)
LEFT VENTRICULAR OUTFLOW TRACT DIAMETER: 1.99 CM
MCH RBC QN AUTO: 26.3 PG (ref 26–34)
MCHC RBC AUTO-ENTMCNC: 32.6 G/DL (ref 32–36)
MCV RBC AUTO: 81 FL (ref 80–100)
MITRAL VALVE E/A RATIO: 0.59
NRBC BLD-RTO: 0 /100 WBCS (ref 0–0)
P AXIS: 24 DEGREES
P AXIS: 33 DEGREES
P OFFSET: 160 MS
P OFFSET: 171 MS
P ONSET: 102 MS
P ONSET: 110 MS
PHOSPHATE SERPL-MCNC: 3.2 MG/DL (ref 2.5–4.9)
PLATELET # BLD AUTO: 217 X10*3/UL (ref 150–450)
POTASSIUM SERPL-SCNC: 3.7 MMOL/L (ref 3.5–5.3)
PR INTERVAL: 220 MS
PR INTERVAL: 238 MS
Q ONSET: 220 MS
Q ONSET: 221 MS
QRS COUNT: 15 BEATS
QRS COUNT: 16 BEATS
QRS DURATION: 70 MS
QRS DURATION: 78 MS
QT INTERVAL: 346 MS
QT INTERVAL: 364 MS
QTC CALCULATION(BAZETT): 439 MS
QTC CALCULATION(BAZETT): 450 MS
QTC FREDERICIA: 405 MS
QTC FREDERICIA: 419 MS
R AXIS: -2 DEGREES
R AXIS: -9 DEGREES
RBC # BLD AUTO: 6.2 X10*6/UL (ref 4.5–5.9)
RIGHT VENTRICLE FREE WALL PEAK S': 17 CM/S
RIGHT VENTRICLE PEAK SYSTOLIC PRESSURE: 25.7 MMHG
SODIUM SERPL-SCNC: 136 MMOL/L (ref 136–145)
T AXIS: 42 DEGREES
T AXIS: 70 DEGREES
T OFFSET: 394 MS
T OFFSET: 402 MS
TRICUSPID ANNULAR PLANE SYSTOLIC EXCURSION: 1.9 CM
VENTRICULAR RATE: 92 BPM
VENTRICULAR RATE: 97 BPM
WBC # BLD AUTO: 10.2 X10*3/UL (ref 4.4–11.3)

## 2024-09-30 PROCEDURE — 2060000001 HC INTERMEDIATE ICU ROOM DAILY

## 2024-09-30 PROCEDURE — 2500000004 HC RX 250 GENERAL PHARMACY W/ HCPCS (ALT 636 FOR OP/ED): Performed by: NURSE PRACTITIONER

## 2024-09-30 PROCEDURE — 97165 OT EVAL LOW COMPLEX 30 MIN: CPT | Mod: GO

## 2024-09-30 PROCEDURE — 36415 COLL VENOUS BLD VENIPUNCTURE: CPT | Performed by: FAMILY MEDICINE

## 2024-09-30 PROCEDURE — 99233 SBSQ HOSP IP/OBS HIGH 50: CPT | Performed by: FAMILY MEDICINE

## 2024-09-30 PROCEDURE — 84100 ASSAY OF PHOSPHORUS: CPT | Performed by: FAMILY MEDICINE

## 2024-09-30 PROCEDURE — 82947 ASSAY GLUCOSE BLOOD QUANT: CPT

## 2024-09-30 PROCEDURE — 93306 TTE W/DOPPLER COMPLETE: CPT

## 2024-09-30 PROCEDURE — 97530 THERAPEUTIC ACTIVITIES: CPT | Mod: GO

## 2024-09-30 PROCEDURE — 2500000001 HC RX 250 WO HCPCS SELF ADMINISTERED DRUGS (ALT 637 FOR MEDICARE OP): Performed by: FAMILY MEDICINE

## 2024-09-30 PROCEDURE — 70450 CT HEAD/BRAIN W/O DYE: CPT

## 2024-09-30 PROCEDURE — 93306 TTE W/DOPPLER COMPLETE: CPT | Performed by: INTERNAL MEDICINE

## 2024-09-30 PROCEDURE — 94760 N-INVAS EAR/PLS OXIMETRY 1: CPT

## 2024-09-30 PROCEDURE — 2500000004 HC RX 250 GENERAL PHARMACY W/ HCPCS (ALT 636 FOR OP/ED): Performed by: FAMILY MEDICINE

## 2024-09-30 PROCEDURE — 70450 CT HEAD/BRAIN W/O DYE: CPT | Performed by: RADIOLOGY

## 2024-09-30 PROCEDURE — 85027 COMPLETE CBC AUTOMATED: CPT | Performed by: FAMILY MEDICINE

## 2024-09-30 RX ORDER — CLOPIDOGREL BISULFATE 75 MG/1
75 TABLET ORAL DAILY
Qty: 30 TABLET | Refills: 0 | Status: SHIPPED | OUTPATIENT
Start: 2024-10-01

## 2024-09-30 RX ADMIN — CLOPIDOGREL 75 MG: 75 TABLET ORAL at 08:12

## 2024-09-30 RX ADMIN — ASPIRIN 81 MG: 81 TABLET, COATED ORAL at 08:12

## 2024-09-30 RX ADMIN — ATORVASTATIN CALCIUM 80 MG: 80 TABLET, FILM COATED ORAL at 20:03

## 2024-09-30 RX ADMIN — POLYETHYLENE GLYCOL 3350 17 G: 17 POWDER, FOR SOLUTION ORAL at 08:12

## 2024-09-30 RX ADMIN — PERFLUTREN 1 ML OF DILUTION: 6.52 INJECTION, SUSPENSION INTRAVENOUS at 09:48

## 2024-09-30 ASSESSMENT — COGNITIVE AND FUNCTIONAL STATUS - GENERAL
DRESSING REGULAR UPPER BODY CLOTHING: A LITTLE
DAILY ACTIVITIY SCORE: 20
DAILY ACTIVITIY SCORE: 24
MOBILITY SCORE: 21
WALKING IN HOSPITAL ROOM: A LITTLE
TOILETING: A LITTLE
DRESSING REGULAR LOWER BODY CLOTHING: A LITTLE
STANDING UP FROM CHAIR USING ARMS: A LITTLE
CLIMB 3 TO 5 STEPS WITH RAILING: A LITTLE
HELP NEEDED FOR BATHING: A LITTLE

## 2024-09-30 ASSESSMENT — PAIN - FUNCTIONAL ASSESSMENT
PAIN_FUNCTIONAL_ASSESSMENT: 0-10
PAIN_FUNCTIONAL_ASSESSMENT: 0-10

## 2024-09-30 ASSESSMENT — PAIN SCALES - GENERAL
PAINLEVEL_OUTOF10: 0 - NO PAIN

## 2024-09-30 ASSESSMENT — ACTIVITIES OF DAILY LIVING (ADL): ADL_ASSISTANCE: INDEPENDENT

## 2024-09-30 NOTE — PROGRESS NOTES
Subjective Data:  Pt doing well.     Overnight Events:    No acute issues reported overnight.      Objective Data:  Last Recorded Vitals:  Vitals:    09/30/24 1020 09/30/24 1100 09/30/24 1125 09/30/24 1502   BP: 149/89 (!) 174/127 145/68 (!) 149/92   BP Location:    Left arm   Patient Position:    Lying   Pulse: 76  72    Resp: 22 16 19    Temp: 37.3 °C (99.1 °F)   36.5 °C (97.7 °F)   TempSrc:    Temporal   SpO2: 90% 91% 92%    Weight:       Height:           Last Labs:  CBC - 9/30/2024:  5:40 AM  10.2 16.3 217    50.0      CMP - 9/30/2024:  5:40 AM  8.7 8.0 22 --- 1.3   3.2 3.9 19 81      PTT - No results in last year.  1.1   12.3 _     TROPHS   Date/Time Value Ref Range Status   09/28/2024 08:06 PM 1,045 0 - 20 ng/L Final     Comment:     Previous result verified on 9/28/2024 1239 on specimen/case [id number] called with component TRPHS for procedure Troponin I, High Sensitivity with value 1,218 ng/L.   09/28/2024 03:43 PM 1,384 0 - 20 ng/L Final     Comment:     Previous result verified on 9/28/2024 1239 on specimen/case [id number] called with component TRPHS for procedure Troponin I, High Sensitivity with value 1,218 ng/L.   09/28/2024 11:53 AM 1,218 0 - 20 ng/L Final     BNP   Date/Time Value Ref Range Status   09/28/2024 03:07  0 - 99 pg/mL Final   12/08/2023 07:26  0 - 99 pg/mL Final     HGBA1C   Date/Time Value Ref Range Status   09/28/2024 03:07 PM 5.9 See comment % Final   12/09/2023 06:01 AM 6.3 see below % Final     LDLCALC   Date/Time Value Ref Range Status   09/29/2024 05:30 AM 42 <=99 mg/dL Final     Comment:                                 Near   Borderline      AGE      Desirable  Optimal    High     High     Very High     0-19 Y     0 - 109     ---    110-129   >/= 130     ----    20-24 Y     0 - 119     ---    120-159   >/= 160     ----      >24 Y     0 -  99   100-129  130-159   160-189     >/=190     12/09/2023 06:01 AM 61 <=99 mg/dL Final     Comment:                                  Near   Borderline      AGE      Desirable  Optimal    High     High     Very High     0-19 Y     0 - 109     ---    110-129   >/= 130     ----    20-24 Y     0 - 119     ---    120-159   >/= 160     ----      >24 Y     0 -  99   100-129  130-159   160-189     >/=190       VLDL   Date/Time Value Ref Range Status   09/29/2024 05:30 AM 23 0 - 40 mg/dL Final   12/09/2023 06:01 AM 21 0 - 40 mg/dL Final      Last I/O:  I/O last 3 completed shifts:  In: 480 (4.2 mL/kg) [P.O.:480]  Out: 1750 (15.5 mL/kg) [Urine:1750 (0.4 mL/kg/hr)]  Weight: 113.2 kg     Past Cardiology Tests (Last 3 Years):  EKG:  ECG 12 lead 09/28/2024      ECG 12 lead 09/28/2024 (Preliminary)    Echo:  Transthoracic Echo (TTE) Complete 09/30/2024      Transthoracic Echo (TTE) Complete 12/11/2023    Ejection Fractions:  EF   Date/Time Value Ref Range Status   09/30/2024 10:09 AM 57 %    12/11/2023 11:43 AM 42       Cath:  No results found for this or any previous visit from the past 1095 days.    Stress Test:  No results found for this or any previous visit from the past 1095 days.    Cardiac Imaging:  No results found for this or any previous visit from the past 1095 days.      Inpatient Medications:  Scheduled medications   Medication Dose Route Frequency    aspirin  81 mg oral Daily    atorvastatin  80 mg oral Nightly    clopidogrel  75 mg oral Daily    polyethylene glycol  17 g oral Daily     PRN medications   Medication    acetaminophen    LORazepam    oxygen     Continuous Medications   Medication Dose Last Rate       Physical Exam:  Physical Exam  HENT:      Head: Normocephalic.      Nose: Nose normal.      Mouth/Throat:      Mouth: Mucous membranes are moist.   Eyes:      Conjunctiva/sclera: Conjunctivae normal.   Cardiovascular:      Rate and Rhythm: Normal rate and regular rhythm.   Pulmonary:      Effort: Pulmonary effort is normal.   Abdominal:      Palpations: Abdomen is soft.   Musculoskeletal:      Right lower leg: No edema.       Left lower leg: No edema.   Skin:     General: Skin is warm.   Neurological:      Mental Status: He is alert.   Psychiatric:         Mood and Affect: Mood normal.         Behavior: Behavior normal.            Assessment/Plan   Mr. Starr is a 64 year old gentleman with a history of CVA, seizure disorder, ischemic cardiomyopathy(LVEF 45%), HTN, HLD, CAD s/p PCI to LAD (4/2022), presented to the ER with a change in mental status. He denies chest pian or shortness of breath. CT scan showed large acute cortical infarction involving the medial aspect of the left temporal lobe,left parietal and left occipital lobes with associated acute thrombus involving the left MCA. Neurology advised against anticoagulation at this time. Troponin is elevated secondary to demand ischemia.      Acute L MCA stroke  Non-MI troponin elevation in the setting of acute CVA  Chronic ischemic heart disease, CAD s/p PCI to LAD (04/2022)  ICM (EF 45%)  HTN  HLD    -Echocardiogram recommended, reviewed and not significantly changed from prior  -Telemetry with no evidence of Atrial fibrillation  -c/w ASA, Plavix, Atorvastatin    Peripheral IV 09/28/24 20 G Distal;Right;Upper;Anterior Arm (Active)   Site Assessment Clean;Dry;Intact 09/30/24 0840   Dressing Status Clean;Dry;Occlusive 09/30/24 0840   Number of days: 2       Code Status:  DNR    I spent  minutes in the professional and overall care of this patient.        Monica Westfall PA-C

## 2024-09-30 NOTE — PROGRESS NOTES
09/30/24 1456   Discharge Planning   Living Arrangements Alone   Support Systems Children   Assistance Needed A&Ox3, independent with ADLs, no DME, room air at baseline, drives. PCP Angelica Dalal APRN-CNP   Type of Residence Private residence   Number of Stairs to Enter Residence 3   Number of Stairs Within Residence 0   Do you have animals or pets at home? No   Who is requesting discharge planning? Provider   Home or Post Acute Services In home services   Type of Home Care Services Home nursing visits;Home OT;Home PT;Home SLP   Expected Discharge Disposition Home H  (PT/OT recommending low frequency skilled services. Discussed with patient and son who are agreeable to HHC. Choices provided and referral sent to [facility], await response.)   Does the patient need discharge transport arranged? No   Patient Choice   Provider Choice list and CMS website (https://medicare.gov/care-compare#search) for post-acute Quality and Resource Measure Data were provided and reviewed with: Patient;Family     9/30/24 @ 1458: Patient here with CVA and has some expressive aphasia and vision impairment in right visual field. PT /OT recommends HHC. Discussed with patient and son. Plan for patient to stay with son temporarily then for HHC to initiate once patient returns to his home next week. Referral sent to [facility], [facility], and [facility] for SN, PT, OT, SLP.  Await agencies response. Patient will not be able to drive. Provided resources for life alert, [facility] Neighbor for transport, MOW.

## 2024-09-30 NOTE — CARE PLAN
SW consulted by TCC re: pt needing resources to be able to live on his own once he returns home.  Resources added to AVS.  TCC speaking to son who  may take pt home to his (son's) house for a short time on discharge.

## 2024-09-30 NOTE — CARE PLAN
The patient's goals for the shift include  to be free of tremors    The clinical goals for the shift include pt will not have any neuro decline through night

## 2024-09-30 NOTE — PROGRESS NOTES
Occupational Therapy                 Therapy Communication Note    Patient Name: Jg Starr  MRN: [id number]  Department: [facility]  Room: 251/[facility]  Today's Date: 9/30/2024     Discipline: Occupational Therapy    Missed Visit Reason: Missed Visit Reason: Patient placed on medical hold (Per RN, pt's stroke symptoms have evolved and patient is going to be transferred. No OT eval)    Missed Time: Attempt

## 2024-09-30 NOTE — PROGRESS NOTES
Occupational Therapy    Evaluation/Treatment    Patient Name: Jg Starr  MRN: [id number]  Department: [facility]  Room: [address] [place], [place] [address]  Today's Date: 09/30/24  Time Calculation  Start Time: 1308  Stop Time: 1338  Time Calculation (min): 30 min     Assessment:  OT Assessment: Pt has no skilled acute OT needs  Prognosis: Good  Barriers to Discharge: None  Evaluation/Treatment Tolerance: Patient tolerated treatment well  Medical Staff Made Aware: Yes  End of Session Communication: Bedside nurse  End of Session Patient Position: Bed, 3 rail up, Alarm off, not on at start of session (no alarm necessary per RN)  Prognosis: Good  Barriers to Discharge: None  Evaluation/Treatment Tolerance: Patient tolerated treatment well  Medical Staff Made Aware: Yes  Strengths: Premorbid level of function, Ability to acquire knowledge, Insight into problems, Support of Caregivers  Barriers to Participation: Comorbidities  Plan:  No Skilled OT: No acute OT goals identified  OT Frequency: OT eval only  OT Discharge Recommendations: No further acute OT, No OT needed after discharge  OT Recommended Transfer Status: Stand by assist  OT - OK to Discharge: Yes (per OT POC)     Subjective   Current Problem:  1. Acute stroke due to occlusion of left middle cerebral artery (Multi)  clopidogrel (Plavix) 75 mg tablet    Referral to Primary Care - Family Practice    Referral to Neurology    Referral to Home Health      2. Ischemic cardiomyopathy  Transthoracic Echo (TTE) Complete    Transthoracic Echo (TTE) Complete      3. Cerebrovascular accident (CVA), unspecified mechanism (Multi)  Transthoracic Echo (TTE) Complete    Transthoracic Echo (TTE) Complete      4. CAD S/P percutaneous coronary angioplasty  Transthoracic Echo (TTE) Complete    Transthoracic Echo (TTE) Complete        General:   OT Received On: 09/30/24  General  Reason for Referral: Pt is a 65 y/o male who was admitted due to visual disturbances and expressive aphasia and was found to  have large acute cortical infarction involving the medial aspect of the left temporal lobe, left parietal and left occipital lobes with associated acute thrombus involving the left MCA.  Past Medical History Relevant to Rehab: History of seizure disorder, CVA, and ischemic cardiomyopathy  Missed Visit: Yes  Missed Visit Reason: Patient placed on medical hold (Per RN, pt's stroke symptoms have evolved and patient is going to be transferred. No OT eval)  Family/Caregiver Present: No  Prior to Session Communication: Bedside nurse  Patient Position Received: Alarm off, not on at start of session, Bed, 3 rail up  General Comment: Pt pleasant and agreeable to OT eval. Pt easily frustrated d/t knowing what he wants to say but has difficulty getting the words out. Pt also reports visual deficits in the R eye  Precautions:  Medical Precautions: Fall precautions, Seizure precautions (tele)    Pain:  Pain Assessment  Pain Assessment: 0-10  0-10 (Numeric) Pain Score: 0 - No pain    Objective   Cognition:  Overall Cognitive Status: Impaired  Orientation Level:  (At least oriented to self. Pt has aphasia and difficulty finding the words)     Home Living:  Type of Home: House  Lives With: Adult children (pt reports he is going to stay with his son)  Home Adaptive Equipment: Crutches  Home Layout: Two level, Stairs to alternate level with rails (pt states he is going to be staying on the 1st floor)  Alternate Level Stairs-Rails:  (1 HR)  Alternate Level Stairs-Number of Steps: 10  Home Access: Stairs to enter without rails  Entrance Stairs-Rails: None  Entrance Stairs-Number of Steps: 3  Bathroom Shower/Tub: Walk-in shower  Bathroom Toilet: Standard  Bathroom Equipment: None  Prior Function:  Level of [place]: Independent with ADLs and functional transfers, Independent with homemaking with ambulation  ADL Assistance: Independent  Homemaking Assistance: Independent  Ambulatory Assistance: Independent  Prior Function Comments:  +driving and working    ADL:  LE Dressing Assistance: Independent  LE Dressing Deficit: Don/doff R sock, Don/doff L sock  ADL Comments: Anticipate independence-supervision for other ADL's    Activity Tolerance:  Endurance: Tolerates 10 - 20 min exercise with multiple rests    Bed Mobility/Transfers:   Bed Mobility  Bed Mobility: Yes  Bed Mobility 1  Bed Mobility 1: Supine to sitting, Sitting to supine  Level of Assistance 1: Independent    Transfers  Transfer: Yes  Transfer 1  Transfer From 1: Sit to, Stand to  Transfer to 1: Sit, Stand  Technique 1: Sit to stand, Stand to sit  Transfer Device 1: Walker  Transfer Level of Assistance 1: Modified independent    Functional Mobility:  Functional Mobility  Functional Mobility Performed: Yes  Functional Mobility 1  Surface 1: Level tile  Device 1: Rolling walker  Assistance 1: Distant supervision  Comments 1: Pt ambulated in the hallway with FWW at supervision. Pt then ambulated back to his room with no AD at SBA  Sitting Balance:  Static Sitting Balance  Static Sitting-Balance Support: Feet supported, No upper extremity supported  Static Sitting-Level of Assistance: Independent  Dynamic Sitting Balance  Dynamic Sitting-Balance Support: Feet supported, No upper extremity supported  Dynamic Sitting-Level of Assistance: Independent  Dynamic Sitting-Balance: Forward lean  Standing Balance:  Static Standing Balance  Static Standing-Balance Support: Bilateral upper extremity supported  Static Standing-Level of Assistance: Distant supervision  Dynamic Standing Balance  Dynamic Standing-Balance Support: No upper extremity supported  Dynamic Standing-Level of Assistance: Close supervision    Therapy/Activity:   Therapeutic Activity  Therapeutic Activity Performed: Yes  Therapeutic Activity 1: transfers and mobility training. education on compensation for visual deficits and safety with going home    Vision:  Vision - Complex Assessment  Vision Comments: Pt reports blurred vision  in the R eye and words/objects overlapping with both eyes open. Pt also with R hemianopsia  Sensation:  Light Touch: No apparent deficits  Strength:  Strength Comments: B UE's: 5/5    Coordination:  Finger to Nose: Intact   Hand Function:  Hand Function  Gross Grasp: Functional  Coordination: Functional    Extremities: RUE   RUE : Within Functional Limits and LUE   LUE: Within Functional Limits    Outcome Measures: [facility] Daily Activity  Putting on and taking off regular lower body clothing: None  Bathing (including washing, rinsing, drying): None  Putting on and taking off regular upper body clothing: None  Toileting, which includes using toilet, bedpan or urinal: None  Taking care of personal grooming such as brushing teeth: None  Eating Meals: None  Daily Activity - Total Score: 24    Education Documentation  Body Mechanics, taught by Lashawn Padron OT at 9/30/2024  2:17 PM.  Learner: Patient  Readiness: Acceptance  Method: Explanation  Response: Verbalizes Understanding, Demonstrated Understanding    ADL Training, taught by Lsahawn Padron OT at 9/30/2024  2:17 PM.  Learner: Patient  Readiness: Acceptance  Method: Explanation  Response: Verbalizes Understanding, Demonstrated Understanding    Education Comments  No comments found.

## 2024-09-30 NOTE — DISCHARGE INSTR - APPOINTMENTS
For Meals on Wheels call [phone]    For Transportation Call [employer] at [phone]  Also for Transportation , you can call [employer] [phone].  Applications need to be completed for both of these services.    [employer] is an [place] agency whose aim is to keep a person in their home.  [phone]    Emergency Alert companies - There are many choices and good information on the internet. Here are 2 options:     [employer] - [phone] ext [phone]     [employer] - [phone] ([phone])

## 2024-09-30 NOTE — PROGRESS NOTES
Jg Starr is a 64 y.o. male on day 2 of admission presenting with Acute stroke due to occlusion of left middle cerebral artery (Multi).      Subjective   Was called to bedside by nurse as patient reported worsening visual disturbances       Objective     Last Recorded Vitals  /68   Pulse 72   Temp 37.3 °C (99.1 °F)   Resp 19   Wt 113 kg (249 lb 9 oz)   SpO2 92%   Intake/Output last 3 Shifts:    Intake/Output Summary (Last 24 hours) at 9/30/2024 1410  Last data filed at 9/30/2024 1302  Gross per 24 hour   Intake 720 ml   Output 2200 ml   Net -1480 ml       Admission Weight  Weight: 113 kg (249 lb 9 oz) (09/28/24 1140)    Daily Weight  09/28/24 : 113 kg (249 lb 9 oz)    Image Results  CT brain attack head wo IV contrast  Narrative: Interpreted By:  Rolly Slade,   STUDY:  SURESH BLANCO; 9/30/2024 11:14 am      INDICATION:  Signs/Symptoms:visiual change;      COMPARISON:  09/29/2024      ACCESSION NUMBER(S):  [id number]      ORDERING CLINICIAN:  SURESH BLANCO      TECHNIQUE:  Contiguous axial images were acquired from the vertex through the  posterior fossa without IV contrast. All CT examinations are  performed with 1 or more of the following dose reduction techniques:  Automated exposure control, adjustment of mA and/or kv according to  patient's size, or use of iterative reconstruction techniques.      FINDINGS:  No significant interval change in appearance of hypodensity and  subacute infarction involving the left posterior cerebral artery  territory involving the left occipital lobe, medial left temporal  lobe with extension to the base of the left parietal lobe. Findings  appear unchanged. Sulcal effacement is again noted however no  additional significant mass effect or midline shift.      Old infarct is again noted in the left parietal lobe, unchanged.      The ventricles are stable in appearance with no hydrocephalus.      No acute intracranial hemorrhage is seen. No intra-axial  or  extra-axial fluid collection is seen.      The visualized paranasal sinuses and mastoid air cells are clear.      Impression: No evidence for interval change from the prior study of 09/29/2024.  Again seen is subacute infarct involving the left posterior cerebral  artery territory which is unchanged in appearance. No new findings or  interval hemorrhage is seen.              Findings discussed with patient's nurse via telephone Alis Uribe at 11:17 a.m. on 09/30/2024      Signed by: Rolly Slade 9/30/2024 11:54 AM  Dictation workstation:   [id number]  Transthoracic Echo (TTE) [facility], [address] [place] [address]                Tel [phone] and Fax [phone]    TRANSTHORACIC ECHOCARDIOGRAM REPORT       Patient Name:      TAWANNA ALLISON     Reading Physician:    [id number] Donta Schuler MD  Study Date:        9/30/2024            Ordering Provider:    [id number] BRODY RODRIGUEZ  MRN/PID:           [id number]             Fellow:  Accession#:        [id number]         Nurse:                Nelia Golden RN BSN  Date of Birth/Age: 1960 / 64 years Sonographer:          Crys Piper RDCS  Gender:            M                    Additional Staff:  Height:            182.88 cm            Admit Date:           9/28/2024  Weight:            112.94 kg            Admission Status:     Inpatient -                                                                Routine  BSA / BMI:         2.34 m2 / 33.77      Encounter#:           [id number]                     kg/m2  Blood Pressure:    157/88 mmHg          Department Location:  On license of [facility]                                                                 Invasive    Study Type:    TRANSTHORACIC ECHO (TTE) COMPLETE  Diagnosis/ICD: Ischemic cardiomyopathy-I25.5; Cerebral Infarction,                 unspecified-I63.9  Indication:    Cerebrovascular Accident  CPT Code:      Echo Complete w Full Doppler-12954    Patient History:  Pertinent History: HTN, Hyperlipidemia and CAD. Isc CMO, Cardiac stents,                     Elevated troponin, Elevated BNP,.    Study Detail: The following Echo studies were performed: 2D, M-Mode, Doppler and                color flow. Technically challenging study due to poor acoustic                windows. Definity used as a contrast agent for endocardial border                definition and agitated saline used as a contrast agent for                intraseptal flow evaluation. Total contrast used for this                procedure was 1.5 mL via IV push.       PHYSICIAN INTERPRETATION:  Left Ventricle: The left ventricular systolic function is normal, with a Stone's biplane calculated ejection fraction of 57%. Left venticular wall motion is abnormal. The left ventricular cavity size is normal. The left ventricular septal wall thickness is mildly increased. Spectral Doppler shows a normal pattern of left ventricular diastolic filling.  LV Wall Scoring:  The apical septal segment and apex are akinetic.    Left Atrium: The left atrium is normal in size. A bubble study using agitated saline was performed. Bubble study is negative.  Right Ventricle: The right ventricle is normal in size. There is normal right ventricular global systolic function.  Right Atrium: The right atrium is normal in size.  Aortic Valve: The aortic valve is trileaflet. The aortic valve dimensionless index is 0.76. There is mild aortic valve regurgitation. The peak instantaneous gradient of the aortic valve is 8.1 mmHg. The mean gradient of the aortic valve is 4.8 mmHg.  Mitral Valve: The mitral valve is normal in structure. There is no evidence of mitral valve  regurgitation.  Tricuspid Valve: The tricuspid valve is structurally normal. There is trace tricuspid regurgitation.  Pulmonic Valve: The pulmonic valve is structurally normal. There is no indication of pulmonic valve regurgitation.  Pericardium: There is no pericardial effusion noted.  Aorta: The aortic root was not well visualized.  Systemic Veins: The inferior vena cava appears normal in size, with IVC inspiratory collapse greater than 50%.  In comparison to the previous echocardiogram(s): Compared with study dated 12/11/2023, no significant change.       CONCLUSIONS:   1. The left ventricular systolic function is normal, with a Stone's biplane calculated ejection fraction of 57%.   2. Abnormal left venticular wall motion.   3. Apical septal segment and apex are abnormal.   4. There is normal right ventricular global systolic function.   5. Mild aortic valve regurgitation.    QUANTITATIVE DATA SUMMARY:     2D MEASUREMENTS:          Normal Ranges:  IVSd:            1.35 cm  (0.6-1.1cm)  LVPWd:           1.50 cm  (0.6-1.1cm)  LVIDd:           3.74 cm  (3.9-5.9cm)  LVIDs:           2.62 cm  LV Mass Index:   83 g/m2  LVEDV Index:     53 ml/m2  LV % FS          29.9 %       LA VOLUME:                    Normal Ranges:  LA Vol A4C:        44.6 ml    (22+/-6mL/m2)  LA Vol A2C:        45.2 ml  LA Vol BP:         45.7 ml  LA Vol Index A4C:  19.1 ml/m2  LA Vol Index A2C:  19.3 ml/m2  LA Vol Index BP:   19.6 ml/m2  LA Area A4C:       17.3 cm2  LA Area A2C:       17.1 cm2  LA Major Axis A4C: 5.7 cm  LA Major Axis A2C: 5.5 cm  LA Volume Index:   19.5 ml/m2  LA Vol A4C:        42.3 ml  LA Vol A2C:        42.7 ml  LA Vol Index BSA:  18.2 ml/m2       RA VOLUME BY A/L METHOD:          Normal Ranges:  RA Area A4C:             12.8 cm2       M-MODE MEASUREMENTS:         Normal Ranges:  Ao Root:             3.50 cm (2.0-3.7cm)  LAs:                 4.00 cm (2.7-4.0cm)       LV SYSTOLIC FUNCTION BY 2D PLANIMETRY (MOD):                        Normal Ranges:  EF-A4C View:    57 % (>=55%)  EF-A2C View:    60 %  EF-Biplane:     57 %  LV EF Reported: 57 %       LV DIASTOLIC FUNCTION:           Normal Ranges:  MV Peak E:             0.47 m/s  (0.7-1.2 m/s)  MV Peak A:             0.80 m/s  (0.42-0.7 m/s)  E/A Ratio:             0.59      (1.0-2.2)  MV e'                  0.075 m/s (>8.0)  MV lateral e'          0.09 m/s  MV medial e'           0.06 m/s  E/e' Ratio:            6.31      (<8.0)       MITRAL VALVE:          Normal Ranges:  MV DT:        243 msec (150-240msec)       AORTIC VALVE:                     Normal Ranges:  AoV Vmax:                1.42 m/s (<=1.7m/s)  AoV Peak P.1 mmHg (<20mmHg)  AoV Mean P.8 mmHg (1.7-11.5mmHg)  LVOT Max Anders:            0.92 m/s (<=1.1m/s)  AoV VTI:                 25.67 cm (18-25cm)  LVOT VTI:                19.43 cm  LVOT Diameter:           1.99 cm  (1.8-2.4cm)  AoV Area, VTI:           2.37 cm2 (2.5-5.5cm2)  AoV Area,Vmax:           2.03 cm2 (2.5-4.5cm2)  AoV Dimensionless Index: 0.76       RIGHT VENTRICLE:  RV Basal 3.60 cm  RV Mid   2.50 cm  RV Major 7.5 cm  TAPSE:   19.0 mm  RV s'    0.17 m/s       TRICUSPID VALVE/RVSP:          Normal Ranges:  Peak TR Velocity:     2.38 m/s  RV Syst Pressure:     26 mmHg  (< 30mmHg)       PULMONIC VALVE:          Normal Ranges:  PV Max Anders:     0.9 m/s  (0.6-0.9m/s)  PV Max PG:      3.6 mmHg       [id number] Donta Schuler MD  Electronically signed on 2024 at 10:40:15 AM       Wall Scoring       ** Final **  ECG 12 lead  Sinus rhythm with 1st degree AV block  Minimal voltage criteria for LVH, may be normal variant ( R in aVL )  Septal infarct (cited on or before 28-SEP-2024)  Abnormal ECG  When compared with ECG of 08-DEC-2023 19:37,  Previous ECG has undetermined rhythm, needs review  Serial changes of Septal infarct Present  See ED provider note for full interpretation and clinical correlation  Confirmed by Vonda Carmona ([id number]) on  9/30/2024 10:18:21 AM  ECG 12 lead  Sinus rhythm with 1st degree AV block  Minimal voltage criteria for LVH, may be normal variant  Septal infarct (cited on or before 28-SEP-2024)  Abnormal ECG  When compared with ECG of 28-SEP-2024 11:45, (unconfirmed)  Questionable change in initial forces of Septal leads      Physical Exam    Gen: lying comfortably in bed, not in acute distress  HEENT: atraumatic, normocephalic  Pulm: normal respiratory effort, clear to auscultation b/l  Cardiac: RRR, no murmurs noted, normal S1/S2  GI: Soft, nontender, BS+  MSK: normal ROM without joint swelling  Extremities: no LE edema, cyanosis  Neuro:: Expressive aphasia present, has difficulty naming items however his speech is fluid, as continued right sided hemianopsia improving ataxia, sensation throughout, no dysarthria  Psych: calm and appropriate for situation        Assessment/Plan      Jg Starr is a 64 y.o. male with history of seizure disorder, CVA, and ischemic cardiomyopathy who presented to the emergency room due to disturbances and expressive aphasia and was found to have large acute cortical infarction involving the medial aspect of the left temporal lobe, left parietal and left occipital lobes with associated acute thrombus involving the left MCA.     large acute cortical infarction involving the medial aspect of the left temporal lobe, left parietal and left occipital lobes with associated acute thrombus involving the left MCA  MRI brain reveals acute to early subacute infarction of the left posterior cerebral artery territory involving the left occipital lobe and medial left temporal lobe, and also revealed areas of hemosiderin deposition anterior dystrophic calcification/mineralization  MRA reveals signal drop-off along with distal M1/M2 segments of the right middle cerebral artery  Continue aspirin Plavix and statin  Echocardiogram from 12/23 showed LVEF of 40 to 45%, apical septal segment and apex are abnormal,  and diastolic dysfunction  Repeat echo is similar to the past  hA1c 5.9  Neurology advised the hemosiderin deposition is consistent with cerebral amyloid angiopathy and advised against anticoagulation in the foreseeable future further advised to continue DAPT  PT and OT advised for low intensity in therapy     Visual disturbances  Patient reported increased and blurry vision on top of his right side hemianopsia  There were no other changes in his neurologic exam  Stat CT of the head showed no acute changes  Case discussed with neurology/BAT team who advised given the normal CT and limited symptoms there is no need for further workup patient should continue to follow with PT and OT as an outpatient    Elevated troponin due to demand ischemia/coronary artery disease  Case discussed with cardiology  Given risk of hemorrhagic conversion neurology advised against anticoagulation  Cardiology agreed with neurology  Echocardiogram from 12/23 showed LVEF of 40 to 45%, apical septal segment and apex are abnormal, and diastolic dysfunction  Repeat echo is same as the past  Cardiology advised patient is cleared for discharge from their standpoint  Continue aspirin, Plavix and statin  Monitor on telemetry  Phone trending down     Leukocytosis  Chest x-ray showed no acute cardiopulmonary processes  C19 PCR negative  Likely related to stroke  Leukocytosis has improved     Seizure disorder  Continue Keppra  Keppra level 10  Give Ativan as needed for seizure  Placed on seizure precautions     Hypertension  Give labetalol as needed for systolic greater than 220  Holding home medications to allow for permissive hypertension     Gout   Continue allopurinol     Prophylaxis  SCDs  Will hold pharmacologic prophylaxis due to risk of hemorrhagic conversion     Status  DNR as discussed with son at bedside     Physician  Patient is medically stable for discharge  Med rec is prepped  PT and OT advised for low intensity therapy  Patient is  unable to drive until cleared by neurology due to visual disturbances  Planning to discharge home with his son however the son reports he needs a day or 2 to prepare his house for his father's discharge        Dung Womack, DO

## 2024-10-01 ENCOUNTER — HOME HEALTH ADMISSION (OUTPATIENT)
Dept: HOME HEALTH SERVICES | Facility: HOME HEALTH | Age: 64
End: 2024-10-01
Payer: MEDICAID

## 2024-10-01 DIAGNOSIS — R94.31 ABNORMAL EKG: Primary | ICD-10-CM

## 2024-10-01 LAB
GLUCOSE BLD MANUAL STRIP-MCNC: 104 MG/DL (ref 74–99)
GLUCOSE BLD MANUAL STRIP-MCNC: 107 MG/DL (ref 74–99)
GLUCOSE BLD MANUAL STRIP-MCNC: 112 MG/DL (ref 74–99)
GLUCOSE BLD MANUAL STRIP-MCNC: 112 MG/DL (ref 74–99)

## 2024-10-01 PROCEDURE — 82947 ASSAY GLUCOSE BLOOD QUANT: CPT

## 2024-10-01 PROCEDURE — 94762 N-INVAS EAR/PLS OXIMTRY CONT: CPT

## 2024-10-01 PROCEDURE — 97116 GAIT TRAINING THERAPY: CPT | Mod: GP,CQ

## 2024-10-01 PROCEDURE — 97530 THERAPEUTIC ACTIVITIES: CPT | Mod: GP,CQ

## 2024-10-01 PROCEDURE — 99232 SBSQ HOSP IP/OBS MODERATE 35: CPT | Performed by: STUDENT IN AN ORGANIZED HEALTH CARE EDUCATION/TRAINING PROGRAM

## 2024-10-01 PROCEDURE — 2500000004 HC RX 250 GENERAL PHARMACY W/ HCPCS (ALT 636 FOR OP/ED): Performed by: FAMILY MEDICINE

## 2024-10-01 PROCEDURE — 2060000001 HC INTERMEDIATE ICU ROOM DAILY

## 2024-10-01 PROCEDURE — 2500000001 HC RX 250 WO HCPCS SELF ADMINISTERED DRUGS (ALT 637 FOR MEDICARE OP): Performed by: FAMILY MEDICINE

## 2024-10-01 RX ORDER — ALLOPURINOL 100 MG/1
100 TABLET ORAL DAILY
Status: DISCONTINUED | OUTPATIENT
Start: 2024-10-01 | End: 2024-10-02 | Stop reason: HOSPADM

## 2024-10-01 RX ORDER — LEVETIRACETAM 500 MG/1
750 TABLET ORAL 2 TIMES DAILY
Status: DISCONTINUED | OUTPATIENT
Start: 2024-10-01 | End: 2024-10-02 | Stop reason: HOSPADM

## 2024-10-01 RX ADMIN — LEVETIRACETAM 750 MG: 500 TABLET, FILM COATED ORAL at 20:15

## 2024-10-01 RX ADMIN — ATORVASTATIN CALCIUM 80 MG: 80 TABLET, FILM COATED ORAL at 20:15

## 2024-10-01 RX ADMIN — ALLOPURINOL 100 MG: 100 TABLET ORAL at 10:54

## 2024-10-01 RX ADMIN — CLOPIDOGREL 75 MG: 75 TABLET ORAL at 08:31

## 2024-10-01 RX ADMIN — LEVETIRACETAM 750 MG: 500 TABLET, FILM COATED ORAL at 10:54

## 2024-10-01 RX ADMIN — POLYETHYLENE GLYCOL 3350 17 G: 17 POWDER, FOR SOLUTION ORAL at 08:31

## 2024-10-01 RX ADMIN — ASPIRIN 81 MG: 81 TABLET, COATED ORAL at 08:30

## 2024-10-01 ASSESSMENT — COGNITIVE AND FUNCTIONAL STATUS - GENERAL
CLIMB 3 TO 5 STEPS WITH RAILING: A LITTLE
CLIMB 3 TO 5 STEPS WITH RAILING: A LITTLE
WALKING IN HOSPITAL ROOM: A LITTLE
STANDING UP FROM CHAIR USING ARMS: A LITTLE
MOBILITY SCORE: 21
WALKING IN HOSPITAL ROOM: A LITTLE
MOBILITY SCORE: 21
CLIMB 3 TO 5 STEPS WITH RAILING: A LITTLE
DAILY ACTIVITIY SCORE: 24
MOBILITY SCORE: 22
DAILY ACTIVITIY SCORE: 24
STANDING UP FROM CHAIR USING ARMS: A LITTLE
WALKING IN HOSPITAL ROOM: A LITTLE

## 2024-10-01 ASSESSMENT — PAIN SCALES - GENERAL
PAINLEVEL_OUTOF10: 0 - NO PAIN

## 2024-10-01 ASSESSMENT — PAIN - FUNCTIONAL ASSESSMENT
PAIN_FUNCTIONAL_ASSESSMENT: 0-10
PAIN_FUNCTIONAL_ASSESSMENT: 0-10

## 2024-10-01 NOTE — PROGRESS NOTES
Jg Starr is a 64 y.o. male on day 3 of admission presenting with Acute stroke due to occlusion of left middle cerebral artery (Multi).    Subjective   Pt has expressive aphasia       Objective     Physical Exam  Constitutional: Awake, alert, NAD.  Eyes: PERRLA, EOMI. No erythema or exudate. No proptosis or lid lag.   ENMT: MMM, no nasal congestion, no oral lesions, oropharynx clear without tonsillar erythema or exudate.  Head/Neck: NCAT, neck supple. Full active ROM of the neck. No thyromegaly or mass. No JVP.  Respiratory/Thorax: CTAB, no increased work of breathing, no increased respiratory effort, no wheeze, rales, or rhonchi.  Cardiovascular: Regular rate and rhythm, normal S1 and S2, no murmurs, rubs, or gallops.  Gastrointestinal: Soft, nontender to palpation, nondistended, no guarding or rebound, normoactive bowel sounds  Extremities: 2+ RPs, no cyanosis or edema  Neurological: Aox3, expressive aphasia  Lymphatic: No lymphadenopathy.  Skin: Warm and well perfused. No rash, lesions, or ecchymoses. No jaundice.     Last Recorded Vitals:  /86 (BP Location: Left arm, Patient Position: Lying)   Pulse 81   Temp 36.7 °C (98.1 °F) (Temporal)   Resp 18   Ht 1.829 m (6')   Wt 113 kg (249 lb 9 oz)   SpO2 91%   BMI 33.85 kg/m²      Scheduled medications:  allopurinol, 100 mg, oral, Daily  aspirin, 81 mg, oral, Daily  atorvastatin, 80 mg, oral, Nightly  clopidogrel, 75 mg, oral, Daily  levETIRAcetam, 750 mg, oral, BID  polyethylene glycol, 17 g, oral, Daily      Continuous medications:     PRN medications:  PRN medications: acetaminophen, LORazepam, oxygen     Relevant Results:  Results for orders placed or performed during the hospital encounter of 09/28/24 (from the past 24 hour(s))   POCT GLUCOSE   Result Value Ref Range    POCT Glucose 125 (H) 74 - 99 mg/dL   POCT GLUCOSE   Result Value Ref Range    POCT Glucose 119 (H) 74 - 99 mg/dL   POCT GLUCOSE   Result Value Ref Range    POCT Glucose 131 (H)  74 - 99 mg/dL   POCT GLUCOSE   Result Value Ref Range    POCT Glucose 104 (H) 74 - 99 mg/dL       CT brain attack head wo IV contrast    Result Date: 9/30/2024  Interpreted By:  Rolly Slade, STUDY: SURESH BLANCO; 9/30/2024 11:14 am   INDICATION: Signs/Symptoms:visiual change;   COMPARISON: 09/29/2024   ACCESSION NUMBER(S): [id number]   ORDERING CLINICIAN: SURESH BLANCO   TECHNIQUE: Contiguous axial images were acquired from the vertex through the posterior fossa without IV contrast. All CT examinations are performed with 1 or more of the following dose reduction techniques: Automated exposure control, adjustment of mA and/or kv according to patient's size, or use of iterative reconstruction techniques.   FINDINGS: No significant interval change in appearance of hypodensity and subacute infarction involving the left posterior cerebral artery territory involving the left occipital lobe, medial left temporal lobe with extension to the base of the left parietal lobe. Findings appear unchanged. Sulcal effacement is again noted however no additional significant mass effect or midline shift.   Old infarct is again noted in the left parietal lobe, unchanged.   The ventricles are stable in appearance with no hydrocephalus.   No acute intracranial hemorrhage is seen. No intra-axial or extra-axial fluid collection is seen.   The visualized paranasal sinuses and mastoid air cells are clear.       No evidence for interval change from the prior study of 09/29/2024. Again seen is subacute infarct involving the left posterior cerebral artery territory which is unchanged in appearance. No new findings or interval hemorrhage is seen.       Findings discussed with patient's nurse via telephone Alis Uribe at 11:17 a.m. on 09/30/2024   Signed by: Rolly Slade 9/30/2024 11:54 AM Dictation workstation:   [id number]    Transthoracic Echo (TTE) Complete    Result Date: 9/30/2024   [facility], [id number]  [place] [address]               Tel [phone] and Fax [phone] TRANSTHORACIC ECHOCARDIOGRAM REPORT  Patient Name:      TAWANNA ALLISON     Reading Physician:    [id number] Donta Schuler MD Study Date:        9/30/2024            Ordering Provider:    [id number] BRODY RODRIGUEZ MRN/PID:           [id number]             Fellow: Accession#:        [id number]         Nurse:                Nelia Golden RN BSN Date of Birth/Age: 1960 / 64 years Sonographer:          Crys Piper                                                               RD Gender:            M                    Additional Staff: Height:            182.88 cm            Admit Date:           9/28/2024 Weight:            112.94 kg            Admission Status:     Inpatient -                                                               Routine BSA / BMI:         2.34 m2 / 33.77      Encounter#:           [id number]                    kg/m2 Blood Pressure:    157/88 mmHg          Department Location:  [facility]                                                               Invasive Study Type:    TRANSTHORACIC ECHO (TTE) COMPLETE Diagnosis/ICD: Ischemic cardiomyopathy-I25.5; Cerebral Infarction,                unspecified-I63.9 Indication:    Cerebrovascular Accident CPT Code:      Echo Complete w Full Doppler-[id number] Patient History: Pertinent History: HTN, Hyperlipidemia and CAD. Isc CMO, Cardiac stents,                    Elevated troponin, Elevated BNP,. Study Detail: The following Echo studies were performed: 2D, M-Mode, Doppler and               color flow. Technically challenging study due to poor acoustic               windows. Definity used as a contrast agent for endocardial border               definition and agitated saline  used as a contrast agent for               intraseptal flow evaluation. Total contrast used for this               procedure was 1.5 mL via IV push.  PHYSICIAN INTERPRETATION: Left Ventricle: The left ventricular systolic function is normal, with a Stone's biplane calculated ejection fraction of 57%. Left venticular wall motion is abnormal. The left ventricular cavity size is normal. The left ventricular septal wall thickness is mildly increased. Spectral Doppler shows a normal pattern of left ventricular diastolic filling. LV Wall Scoring: The apical septal segment and apex are akinetic. Left Atrium: The left atrium is normal in size. A bubble study using agitated saline was performed. Bubble study is negative. Right Ventricle: The right ventricle is normal in size. There is normal right ventricular global systolic function. Right Atrium: The right atrium is normal in size. Aortic Valve: The aortic valve is trileaflet. The aortic valve dimensionless index is 0.76. There is mild aortic valve regurgitation. The peak instantaneous gradient of the aortic valve is 8.1 mmHg. The mean gradient of the aortic valve is 4.8 mmHg. Mitral Valve: The mitral valve is normal in structure. There is no evidence of mitral valve regurgitation. Tricuspid Valve: The tricuspid valve is structurally normal. There is trace tricuspid regurgitation. Pulmonic Valve: The pulmonic valve is structurally normal. There is no indication of pulmonic valve regurgitation. Pericardium: There is no pericardial effusion noted. Aorta: The aortic root was not well visualized. Systemic Veins: The inferior vena cava appears normal in size, with IVC inspiratory collapse greater than 50%. In comparison to the previous echocardiogram(s): Compared with study dated 12/11/2023, no significant change.  CONCLUSIONS:  1. The left ventricular systolic function is normal, with a Stone's biplane calculated ejection fraction of 57%.  2. Abnormal left venticular wall  motion.  3. Apical septal segment and apex are abnormal.  4. There is normal right ventricular global systolic function.  5. Mild aortic valve regurgitation. QUANTITATIVE DATA SUMMARY:  2D MEASUREMENTS:          Normal Ranges: IVSd:            1.35 cm  (0.6-1.1cm) LVPWd:           1.50 cm  (0.6-1.1cm) LVIDd:           3.74 cm  (3.9-5.9cm) LVIDs:           2.62 cm LV Mass Index:   83 g/m2 LVEDV Index:     53 ml/m2 LV % FS          29.9 %  LA VOLUME:                    Normal Ranges: LA Vol A4C:        44.6 ml    (22+/-6mL/m2) LA Vol A2C:        45.2 ml LA Vol BP:         45.7 ml LA Vol Index A4C:  19.1 ml/m2 LA Vol Index A2C:  19.3 ml/m2 LA Vol Index BP:   19.6 ml/m2 LA Area A4C:       17.3 cm2 LA Area A2C:       17.1 cm2 LA Major Axis A4C: 5.7 cm LA Major Axis A2C: 5.5 cm LA Volume Index:   19.5 ml/m2 LA Vol A4C:        42.3 ml LA Vol A2C:        42.7 ml LA Vol Index BSA:  18.2 ml/m2  RA VOLUME BY A/L METHOD:          Normal Ranges: RA Area A4C:             12.8 cm2  M-MODE MEASUREMENTS:         Normal Ranges: Ao Root:             3.50 cm (2.0-3.7cm) LAs:                 4.00 cm (2.7-4.0cm)  LV SYSTOLIC FUNCTION BY 2D PLANIMETRY (MOD):                      Normal Ranges: EF-A4C View:    57 % (>=55%) EF-A2C View:    60 % EF-Biplane:     57 % LV EF Reported: 57 %  LV DIASTOLIC FUNCTION:           Normal Ranges: MV Peak E:             0.47 m/s  (0.7-1.2 m/s) MV Peak A:             0.80 m/s  (0.42-0.7 m/s) E/A Ratio:             0.59      (1.0-2.2) MV e'                  0.075 m/s (>8.0) MV lateral e'          0.09 m/s MV medial e'           0.06 m/s E/e' Ratio:            6.31      (<8.0)  MITRAL VALVE:          Normal Ranges: MV DT:        243 msec (150-240msec)  AORTIC VALVE:                     Normal Ranges: AoV Vmax:                1.42 m/s (<=1.7m/s) AoV Peak P.1 mmHg (<20mmHg) AoV Mean P.8 mmHg (1.7-11.5mmHg) LVOT Max Anders:            0.92 m/s (<=1.1m/s) AoV VTI:                  25.67 cm (18-25cm) LVOT VTI:                19.43 cm LVOT Diameter:           1.99 cm  (1.8-2.4cm) AoV Area, VTI:           2.37 cm2 (2.5-5.5cm2) AoV Area,Vmax:           2.03 cm2 (2.5-4.5cm2) AoV Dimensionless Index: 0.76  RIGHT VENTRICLE: RV Basal 3.60 cm RV Mid   2.50 cm RV Major 7.5 cm TAPSE:   19.0 mm RV s'    0.17 m/s  TRICUSPID VALVE/RVSP:          Normal Ranges: Peak TR Velocity:     2.38 m/s RV Syst Pressure:     26 mmHg  (< 30mmHg)  PULMONIC VALVE:          Normal Ranges: PV Max Anders:     0.9 m/s  (0.6-0.9m/s) PV Max PG:      3.6 mmHg  [id number] Donta Schuler MD Electronically signed on 9/30/2024 at 10:40:15 AM  Wall Scoring  ** Final **     MR brain wo IV contrast    Result Date: 9/29/2024  Interpreted By:  Jg Gomez, STUDY: MR BRAIN WO IV CONTRAST; MR ANGIO HEAD WO IV CONTRAST; MR ANGIO NECK WO IV CONTRAST;  9/29/2024 1:28 pm   INDICATION: Signs/Symptoms:cva; Signs/Symptoms:CVA.   COMPARISON:   MRI dated 12/10/2023   ACCESSION NUMBER(S): [id number]; [id number]; [id number]   ORDERING CLINICIAN: SURESH BLANCO   TECHNIQUE: Axial diffusion, axial T2, axial FLAIR, axial gradient echo T2, coronal T1, and sagittal T1 weighted MRI images of the brain were obtained without intravenous contrast administration. In addition, time-of-flight MRA images of the neck and intracranial vessels were obtained.  The source MRA images were reformatted in multiple planes.   FINDINGS: The diffusion-weighted images demonstrate abnormal diffusion restriction within the left posterior cerebral artery territory involving the left occipital lobe and medial left temporal lobe. There is corresponding bright signal on the FLAIR and T2 images. The MRI findings are compatible with an area of acute to early subacute infarction. The gradient echo T2 images demonstrate areas of gyral blooming dark signal corresponding to the area of infarction compatible with a component of gyral hemorrhagic transformation. There is associated mass  effect with effacement of surrounding sulci and partial effacement of the adjacent left lateral ventricle.   An area of encephalomalacia is again noted within the left parietal lobe. There is gyral bright signal on the T1 weighted images as well as areas of blooming dark signal on the gradient echo T2 images suggesting associated areas of hemosiderin deposition anterior dystrophic calcification/mineralization.   The above findings are superimposed upon similar mild diffuse brain parenchymal volume loss.   Scattered nonspecific white matter changes are again noted within cerebral hemispheres bilaterally as well as ill-defined foci of increased signal on the FLAIR and T2 weighted images overlying the brainstem which while nonspecific, given the patient's age, likely represent sequelae of more remote small-vessel ischemic change.   Additional small foci of bright signal on the T2 images are again noted within the subinsular regions and basal ganglia bilaterally suggesting incidental mildly prominent perivascular spaces and/or small scattered more remote lacunar infarctions.   There is no significant midline shift. The suprasellar/basilar cisterns remain patent.   There is mild mucosal thickening noted within scattered ethmoid air cells.   The mastoid air cells are clear.   The MRA of the neck demonstrates no significant stenosis along the carotid bifurcations.  No significant stenosis is noted along the visualized cervical segments of the vertebral arteries.   The intracranial MRA demonstrates asymmetric diminished MRA signal/MRA signal dropout along the distal M1/M2 segments of the right middle cerebral artery which may very well be technical/artifactual in origin although narrowing through these regions can not be excluded. 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 There is a segmental area of diminished MRA signal noted along the distal P2 segment of the right posterior cerebral artery and asymmetric diminished visualization of distal most branch vessels of the left posterior cerebral artery when compared with the right.       There are MRI findings compatible with an area of acute to early subacute infarction within the left posterior cerebral artery territory involving the left occipital lobe and medial left temporal lobe. The gradient echo T2 images demonstrate areas of gyral blooming dark signal corresponding to the area of infarction compatible with a component of gyral hemorrhagic transformation. There is associated mass effect with effacement of surrounding sulci and partial effacement of the adjacent left lateral ventricle.   An area of encephalomalacia is again noted within the left parietal lobe. There is gyral bright signal on the T1 weighted images as well as areas of blooming dark signal on the gradient echo T2 images suggesting associated areas of hemosiderin deposition anterior dystrophic calcification/mineralization.   The above findings are superimposed upon similar mild diffuse brain parenchymal volume loss.   Scattered nonspecific white matter changes are again noted within cerebral hemispheres bilaterally as well as ill-defined foci of increased signal on the FLAIR and T2 weighted images overlying the brainstem which while nonspecific, given the patient's age, likely represent sequelae of more remote small-vessel ischemic change. Additional small foci of bright signal on the T2 images are again noted within the subinsular regions and basal ganglia bilaterally suggesting incidental mildly prominent perivascular spaces and/or small scattered more remote lacunar infarctions.   The MRA of the neck demonstrates no significant stenosis along the carotid bifurcations. No significant stenosis is noted along the visualized cervical segments of the vertebral  arteries.   The intracranial MRA demonstrates asymmetric diminished MRA signal/MRA signal dropout along the distal M1/M2 segments of the right middle cerebral artery which may very well be technical/artifactual in origin although narrowing through these regions can not be excluded. 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 There is a segmental area of diminished MRA signal noted along the distal P2 segment of the right posterior cerebral artery and asymmetric diminished visualization of distal most branch vessels of the left posterior cerebral artery when compared with the right.     MACRO: Critical Finding:  See findings. Notification was initiated on 9/29/2024 at 1:45 pm by  Jg Gomez.  (**-OCF-**)   Signed by: Jg Gomez 9/29/2024 1:45 PM Dictation workstation:   [id number]    MR angio head wo IV contrast    Result Date: 9/29/2024  Interpreted By:  Jg Gomez, STUDY: MR BRAIN WO IV CONTRAST; MR ANGIO HEAD WO IV CONTRAST; MR ANGIO NECK WO IV CONTRAST;  9/29/2024 1:28 pm   INDICATION: Signs/Symptoms:cva; Signs/Symptoms:CVA.   COMPARISON:   MRI dated 12/10/2023   ACCESSION NUMBER(S): [id number]; [id number]; [id number]   ORDERING CLINICIAN: SURESH BLANCO   TECHNIQUE: Axial diffusion, axial T2, axial FLAIR, axial gradient echo T2, coronal T1, and sagittal T1 weighted MRI images of the brain were obtained without intravenous contrast administration. In addition, time-of-flight MRA images of the neck and intracranial vessels were obtained.  The source MRA images were reformatted in multiple planes.   FINDINGS: The diffusion-weighted images demonstrate abnormal diffusion restriction within the left posterior cerebral artery territory involving the left occipital lobe and medial left temporal lobe. There is corresponding bright signal on the  FLAIR and T2 images. The MRI findings are compatible with an area of acute to early subacute infarction. The gradient echo T2 images demonstrate areas of gyral blooming dark signal corresponding to the area of infarction compatible with a component of gyral hemorrhagic transformation. There is associated mass effect with effacement of surrounding sulci and partial effacement of the adjacent left lateral ventricle.   An area of encephalomalacia is again noted within the left parietal lobe. There is gyral bright signal on the T1 weighted images as well as areas of blooming dark signal on the gradient echo T2 images suggesting associated areas of hemosiderin deposition anterior dystrophic calcification/mineralization.   The above findings are superimposed upon similar mild diffuse brain parenchymal volume loss.   Scattered nonspecific white matter changes are again noted within cerebral hemispheres bilaterally as well as ill-defined foci of increased signal on the FLAIR and T2 weighted images overlying the brainstem which while nonspecific, given the patient's age, likely represent sequelae of more remote small-vessel ischemic change.   Additional small foci of bright signal on the T2 images are again noted within the subinsular regions and basal ganglia bilaterally suggesting incidental mildly prominent perivascular spaces and/or small scattered more remote lacunar infarctions.   There is no significant midline shift. The suprasellar/basilar cisterns remain patent.   There is mild mucosal thickening noted within scattered ethmoid air cells.   The mastoid air cells are clear.   The MRA of the neck demonstrates no significant stenosis along the carotid bifurcations.  No significant stenosis is noted along the visualized cervical segments of the vertebral arteries.   The intracranial MRA demonstrates asymmetric diminished MRA signal/MRA signal dropout along the distal M1/M2 segments of the right middle cerebral artery  which may very well be technical/artifactual in origin although narrowing through these regions can not be excluded. 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 There is a segmental area of diminished MRA signal noted along the distal P2 segment of the right posterior cerebral artery and asymmetric diminished visualization of distal most branch vessels of the left posterior cerebral artery when compared with the right.       There are MRI findings compatible with an area of acute to early subacute infarction within the left posterior cerebral artery territory involving the left occipital lobe and medial left temporal lobe. The gradient echo T2 images demonstrate areas of gyral blooming dark signal corresponding to the area of infarction compatible with a component of gyral hemorrhagic transformation. There is associated mass effect with effacement of surrounding sulci and partial effacement of the adjacent left lateral ventricle.   An area of encephalomalacia is again noted within the left parietal lobe. There is gyral bright signal on the T1 weighted images as well as areas of blooming dark signal on the gradient echo T2 images suggesting associated areas of hemosiderin deposition anterior dystrophic calcification/mineralization.   The above findings are superimposed upon similar mild diffuse brain parenchymal volume loss.   Scattered nonspecific white matter changes are again noted within cerebral hemispheres bilaterally as well as ill-defined foci of increased signal on the FLAIR and T2 weighted images overlying the brainstem which while nonspecific, given the patient's age, likely represent sequelae of more remote small-vessel ischemic change. Additional small foci of bright signal on the T2 images are again noted within the subinsular regions and basal  ganglia bilaterally suggesting incidental mildly prominent perivascular spaces and/or small scattered more remote lacunar infarctions.   The MRA of the neck demonstrates no significant stenosis along the carotid bifurcations. No significant stenosis is noted along the visualized cervical segments of the vertebral arteries.   The intracranial MRA demonstrates asymmetric diminished MRA signal/MRA signal dropout along the distal M1/M2 segments of the right middle cerebral artery which may very well be technical/artifactual in origin although narrowing through these regions can not be excluded. 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 There is a segmental area of diminished MRA signal noted along the distal P2 segment of the right posterior cerebral artery and asymmetric diminished visualization of distal most branch vessels of the left posterior cerebral artery when compared with the right.     MACRO: Critical Finding:  See findings. Notification was initiated on 9/29/2024 at 1:45 pm by  Jg Gomez.  (**-OCF-**)   Signed by: Jg Gomez 9/29/2024 1:45 PM Dictation workstation:   [id number]    MR angio neck wo IV contrast    Result Date: 9/29/2024  Interpreted By:  Jg Gomez, STUDY: MR BRAIN WO IV CONTRAST; MR ANGIO HEAD WO IV CONTRAST; MR ANGIO NECK WO IV CONTRAST;  9/29/2024 1:28 pm   INDICATION: Signs/Symptoms:cva; Signs/Symptoms:CVA.   COMPARISON:   MRI dated 12/10/2023   ACCESSION NUMBER(S): [id number]; [id number]; [id number]   ORDERING CLINICIAN: SURESH BLANCO   TECHNIQUE: Axial diffusion, axial T2, axial FLAIR, axial gradient echo T2, coronal T1, and sagittal T1 weighted MRI images of the brain were obtained without intravenous contrast administration. In addition, time-of-flight MRA images of the neck and intracranial vessels were  obtained.  The source MRA images were reformatted in multiple planes.   FINDINGS: The diffusion-weighted images demonstrate abnormal diffusion restriction within the left posterior cerebral artery territory involving the left occipital lobe and medial left temporal lobe. There is corresponding bright signal on the FLAIR and T2 images. The MRI findings are compatible with an area of acute to early subacute infarction. The gradient echo T2 images demonstrate areas of gyral blooming dark signal corresponding to the area of infarction compatible with a component of gyral hemorrhagic transformation. There is associated mass effect with effacement of surrounding sulci and partial effacement of the adjacent left lateral ventricle.   An area of encephalomalacia is again noted within the left parietal lobe. There is gyral bright signal on the T1 weighted images as well as areas of blooming dark signal on the gradient echo T2 images suggesting associated areas of hemosiderin deposition anterior dystrophic calcification/mineralization.   The above findings are superimposed upon similar mild diffuse brain parenchymal volume loss.   Scattered nonspecific white matter changes are again noted within cerebral hemispheres bilaterally as well as ill-defined foci of increased signal on the FLAIR and T2 weighted images overlying the brainstem which while nonspecific, given the patient's age, likely represent sequelae of more remote small-vessel ischemic change.   Additional small foci of bright signal on the T2 images are again noted within the subinsular regions and basal ganglia bilaterally suggesting incidental mildly prominent perivascular spaces and/or small scattered more remote lacunar infarctions.   There is no significant midline shift. The suprasellar/basilar cisterns remain patent.   There is mild mucosal thickening noted within scattered ethmoid air cells.   The mastoid air cells are clear.   The MRA of the neck  demonstrates no significant stenosis along the carotid bifurcations.  No significant stenosis is noted along the visualized cervical segments of the vertebral arteries.   The intracranial MRA demonstrates asymmetric diminished MRA signal/MRA signal dropout along the distal M1/M2 segments of the right middle cerebral artery which may very well be technical/artifactual in origin although narrowing through these regions can not be excluded. 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 There is a segmental area of diminished MRA signal noted along the distal P2 segment of the right posterior cerebral artery and asymmetric diminished visualization of distal most branch vessels of the left posterior cerebral artery when compared with the right.       There are MRI findings compatible with an area of acute to early subacute infarction within the left posterior cerebral artery territory involving the left occipital lobe and medial left temporal lobe. The gradient echo T2 images demonstrate areas of gyral blooming dark signal corresponding to the area of infarction compatible with a component of gyral hemorrhagic transformation. There is associated mass effect with effacement of surrounding sulci and partial effacement of the adjacent left lateral ventricle.   An area of encephalomalacia is again noted within the left parietal lobe. There is gyral bright signal on the T1 weighted images as well as areas of blooming dark signal on the gradient echo T2 images suggesting associated areas of hemosiderin deposition anterior dystrophic calcification/mineralization.   The above findings are superimposed upon similar mild diffuse brain parenchymal volume loss.   Scattered nonspecific white matter changes are again noted within cerebral hemispheres bilaterally as well as  ill-defined foci of increased signal on the FLAIR and T2 weighted images overlying the brainstem which while nonspecific, given the patient's age, likely represent sequelae of more remote small-vessel ischemic change. Additional small foci of bright signal on the T2 images are again noted within the subinsular regions and basal ganglia bilaterally suggesting incidental mildly prominent perivascular spaces and/or small scattered more remote lacunar infarctions.   The MRA of the neck demonstrates no significant stenosis along the carotid bifurcations. No significant stenosis is noted along the visualized cervical segments of the vertebral arteries.   The intracranial MRA demonstrates asymmetric diminished MRA signal/MRA signal dropout along the distal M1/M2 segments of the right middle cerebral artery which may very well be technical/artifactual in origin although narrowing through these regions can not be excluded. 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 There is a segmental area of diminished MRA signal noted along the distal P2 segment of the right posterior cerebral artery and asymmetric diminished visualization of distal most branch vessels of the left posterior cerebral artery when compared with the right.     MACRO: Critical Finding:  See findings. Notification was initiated on 9/29/2024 at 1:45 pm by  Jg Gomez.  (**-OCF-**)   Signed by: Jg Gomez 9/29/2024 1:45 PM Dictation workstation:   [id number]    CT head wo IV contrast    Result Date: 9/29/2024  Interpreted By:  Crys Puente, STUDY: CT HEAD WO IV CONTRAST;  9/29/2024 12:26 pm   INDICATION: Signs/Symptoms:Revealuate CVA.     COMPARISON: 09/28/2024   ACCESSION NUMBER(S): [id number]   ORDERING CLINICIAN: SURESH BLANCO   TECHNIQUE: Noncontrast axial CT scan of head was performed.  Angled reformats in brain and bone windows were generated. The images were reviewed in bone, brain, blood and soft tissue windows.   FINDINGS: CSF Spaces: The ventricles, sulci and basal cisterns are within normal limits. There is no extraaxial fluid collection.   Parenchyma: Redemonstrated is loss of gray-white differentiation and hypodensity involving the left parietal, occipital and medial temporal lobes compatible with an acute to subacute infarct. Possible focal hypodensity within the thalamus on the left. There is encephalomalacia and gliosis involving the left frontoparietal region compatible with a prior infarct. The grey-white differentiation is otherwise intact. There is local mass effect within the region of the infarct with effacement of the sulci. No midline shift. There is no intracranial hemorrhage.   Calvarium: The calvarium is unremarkable.   Paranasal sinuses and mastoids: Visualized paranasal sinuses and mastoids are predominantly clear.       Unchanged acute to subacute infarct involving the left parietal, occipital and medial temporal lobes. There is no acute intracranial hemorrhage. There is local mass effect with effacement of the sulci. No midline shift.   MACRO: None   Signed by: Crys Puente 9/29/2024 12:34 PM Dictation workstation:   [id number]                    Assessment/Plan   Active Problems:  There are no active Hospital Problems.    Jg Starr is a 64 y.o. male with history of seizure disorder, CVA, and ischemic cardiomyopathy who presented to the emergency room due to disturbances and expressive aphasia and was found to have large acute cortical infarction involving the medial aspect of the left temporal lobe, left parietal and left occipital lobes with associated acute thrombus involving the left MCA.     large acute cortical infarction involving the medial aspect of the left temporal lobe, left parietal and left occipital lobes with associated acute thrombus involving the  left MCA  - Continue aspirin Plavix and statin  - Neurology advised the hemosiderin deposition is consistent with cerebral amyloid angiopathy and advised against anticoagulation in the foreseeable future further advised to continue DAPT  - PT and OT advised for low intensity in therapy     Visual disturbances  Patient reported increased and blurry vision on top of his right side hemianopsia  There were no other changes in his neurologic exam  Stat CT of the head showed no acute changes  Case discussed with neurology/BAT team who advised given the normal CT and limited symptoms there is no need for further workup patient should continue to follow with PT and OT as an outpatient     Elevated troponin due to demand ischemia/coronary artery disease  Case discussed with cardiology  Given risk of hemorrhagic conversion neurology advised against anticoagulation  Cardiology agreed with neurology  Cardiology advised patient is cleared for discharge from their standpoint  Continue aspirin, Plavix and statin  Monitor on telemetry     Leukocytosis  improved     Seizure disorder  Continue Keppra  Give Ativan as needed for seizure  Placed on seizure precautions     Hypertension  Give labetalol as needed for systolic greater than 220  Holding home medications to allow for permissive hypertension     Gout   Continue allopurinol     Prophylaxis  SCDs  Will hold pharmacologic prophylaxis due to risk of hemorrhagic conversion     Status  DNR as discussed with son at bedside    Dispo: monitor clinically, awaiting safe dispo  Son should be back in town tomorrow to bring pt home         Jen Villa MD  Hospitalist

## 2024-10-01 NOTE — CARE PLAN
The patient's goals for the shift include      The clinical goals for the shift include Remain stable without further defecits      Problem: General Stroke  Goal: Participate in treatment (ie., meds, therapy) throughout shift  Outcome: Progressing     Problem: Safety - Adult  Goal: Free from fall injury  Outcome: Progressing

## 2024-10-01 NOTE — DISCHARGE SUMMARY
Discharge Diagnosis  Acute stroke due to occlusion of left middle cerebral artery (Multi)    Issues Requiring Follow-Up  Post hospital follow up    Discharge Meds     Medication List      START taking these medications     clopidogrel 75 mg tablet; Commonly known as: Plavix; Take 1 tablet (75   mg) by mouth once daily.     CONTINUE taking these medications     allopurinol 100 mg tablet; Commonly known as: Zyloprim; Take 1 tablet   (100 mg) by mouth once daily.   aspirin 81 mg chewable tablet   atorvastatin 80 mg tablet; Commonly known as: Lipitor; Take 1 tablet (80   mg) by mouth once daily at bedtime.   diclofenac sodium 1 % gel; Commonly known as: Voltaren; Apply 1   Application topically 4 times a day as needed (knee pain).   levETIRAcetam 750 mg tablet; Commonly known as: Keppra; Take 1 tablet   (750 mg) by mouth 2 times a day.   lisinopril 40 mg tablet; Take 1 tablet (40 mg) by mouth once daily.   metoprolol succinate XL 50 mg 24 hr tablet; Commonly known as:   Toprol-XL; Take 1 tablet (50 mg) by mouth once daily.   sodium chloride 0.65 % nasal spray; Commonly known as: Ocean; Administer   1 spray into each nostril 4 times a day as needed for congestion.       Test Results Pending At Discharge  Pending Labs       No current pending labs.            Hospital Course   Jg Starr is a 64 y.o. male with history of seizure disorder, CVA, and ischemic cardiomyopathy who presented to the emergency room due to disturbances and expressive aphasia and was found to have large acute cortical infarction involving the medial aspect of the left temporal lobe, left parietal and left occipital lobes with associated acute thrombus involving the left MCA. Neuro was consulted and recommended Neurology advised the hemosiderin deposition is consistent with cerebral amyloid angiopathy and advised against anticoagulation in the foreseeable future further advised to continue DAPT. He has elevated trop 2/2 demand ischemia. Cardio  was consulted and meds were optimized. Pt should not drive. Pt is hemodynamically stable for discharge at this time with close outpatient follow ups. He is set up with [facility].    Pertinent Physical Exam At Time of Discharge  Physical Exam  Constitutional: Awake, alert, NAD.  Eyes: PERRLA, EOMI. No erythema or exudate. No proptosis or lid lag.   ENMT: MMM, no nasal congestion, no oral lesions, oropharynx clear without tonsillar erythema or exudate.  Head/Neck: NCAT, neck supple. Full active ROM of the neck. No thyromegaly or mass. No JVP.  Respiratory/Thorax: CTAB, no increased work of breathing, no increased respiratory effort, no wheeze, rales, or rhonchi.  Cardiovascular: Regular rate and rhythm, normal S1 and S2, no murmurs, rubs, or gallops.  Gastrointestinal: Soft, nontender to palpation, nondistended, no guarding or rebound, normoactive bowel sounds  Extremities: 2+ RPs, no cyanosis or edema  Neurological: Aox3, expressive aphasia  Lymphatic: No lymphadenopathy.  Skin: Warm and well perfused. No rash, lesions, or ecchymoses. No jaundice.     Outpatient Follow-Up  Future Appointments   Date Time Provider Department Center   10/9/2024 11:00 AM [facility] [address]   11/18/2024 11:00 AM Monica Westfall PA-C [address]   4/25/2025 10:00 AM Angelica Dalal, APRN-CNP [id number] [facility]         Jen Villa MD  Hospitalist

## 2024-10-01 NOTE — PROGRESS NOTES
Physical Therapy    Physical Therapy Treatment    Patient Name: Jg Starr  MRN: [id number]  Department: [facility]  Room: [address] [place], [place] [address]  Today's Date: 10/1/2024  Time Calculation  Start Time: 1020  Stop Time: 1105  Time Calculation (min): 45 min         Assessment/Plan   PT Assessment  PT Assessment Results: Decreased cognition, Impaired balance  Rehab Prognosis: Good  Barriers to Discharge: none  End of Session Communication: Bedside nurse  End of Session Patient Position: Bed, 3 rail up, Alarm on     PT Plan  Treatment/Interventions: Bed mobility, Transfer training, Gait training, Strengthening, Therapeutic exercise  PT Plan: Ongoing PT  PT Frequency: 3 times per week  PT Discharge Recommendations: Low intensity level of continued care  Equipment Recommended upon Discharge:  (least restrictive device)  PT Recommended Transfer Status: Assist x1  PT - OK to Discharge: Yes      General Visit Information:   PT  Visit  PT Received On: 10/01/24  General  Reason for Referral: 65 yo male admitted 2' to disturbances and expressive aphasia. Pt has cortical infarctionmedial aspect of L temporal lobe, L parietal and occipital lobes. thrombus in the MCA  Referred By: Dr. MIGNON Womack  Past Medical History Relevant to Rehab: seizure disorder, CVA, ischemic cardiomyopathy, PCI with stent  Missed Visit: No  Family/Caregiver Present: No  Prior to Session Communication: Bedside nurse, Care Coordinator  Patient Position Received: Bed, 3 rail up, Alarm on  General Comment: AXOX3, +CVA and Hx of CVA, Right eye with decreased peripheral vision and aphasia as noted by word finding difficulty, unable to interpret written words on whiteboard, forgetfulness.    Subjective   Precautions:               Objective   Pain:  Pain Assessment  Pain Assessment: 0-10  0-10 (Numeric) Pain Score: 0 - No pain  Cognition:  Cognition  Overall Cognitive Status: Within Functional Limits  Orientation Level: Disoriented to time, Disoriented to situation  (difficulty processing and aphasia.)  Coordination:     Postural Control:  Static Sitting Balance  Static Sitting-Balance Support: Bilateral upper extremity supported, Feet supported  Static Sitting-Level of Assistance: Close supervision, Independent  Dynamic Sitting Balance  Dynamic Sitting-Balance Support: Feet supported  Dynamic Sitting-Level of Assistance: Independent  Static Standing Balance  Static Standing-Balance Support: No upper extremity supported  Static Standing-Level of Assistance: Close supervision, Independent  Dynamic Standing Balance  Dynamic Standing-Balance Support: No upper extremity supported  Dynamic Standing-Level of Assistance: Close supervision (VC for safety and technique, scanning right)  Extremity/Trunk Assessments:    Activity Tolerance:  Activity Tolerance  Endurance: Endurance does not limit participation in activity  Treatments:                                Bed Mobility  Bed Mobility: Yes  Bed Mobility 1  Bed Mobility 1: Supine to sitting, Sitting to supine  Level of Assistance 1: Independent    Ambulation/Gait Training  Ambulation/Gait Training Performed: Yes  Ambulation/Gait Training 1  Surface 1: Level tile  Device 1: No device  Assistance 1: Close supervision, Distant supervision, Minimal verbal cues (VC related to technique secondary to decreased right peripheral vision, VC to scan area ahead)  Quality of Gait 1: Decreased step length, Inconsistent stride length  Comments/Distance (ft) 1: 225ft with various direction changes and obstacles without device (slightly unsteady a few times, able to correct self, tapped a couple itiems on right but again corrected self. Difficult to get patient to agree to scan area during gait for safety and improved mobility.)  Transfers  Transfer: Yes  Transfer 1  Transfer From 1: Bed to  Transfer to 1: Stand  Technique 1: Sit to stand, Stand to sit  Transfer Device 1:  (no device)  Transfer Level of Assistance 1: Close  supervision    Stairs  Stairs: No     Object From Floor  Comments: picked up 3 objects while sitting Independently, picked up 3 different objects from stand to stoop without assistance or device or environment assist.                Outcome Measures:  [facility] Basic Mobility  Turning from your back to your side while in a flat bed without using bedrails: None  Moving from lying on your back to sitting on the side of a flat bed without using bedrails: None  Moving to and from bed to chair (including a wheelchair): None  Standing up from a chair using your arms (e.g. wheelchair or bedside chair): None  To walk in hospital room: A little  Climbing 3-5 steps with railing: A little  Basic Mobility - Total Score: 22    Education Documentation  Handouts, taught by Cherie French PTA at 10/1/2024  2:26 PM.  Learner: Patient  Readiness: Eager  Method: Explanation  Response: Needs Reinforcement    Home Exercise Program, taught by Cherie French PTA at 10/1/2024  2:26 PM.  Learner: Patient  Readiness: Eager  Method: Explanation  Response: Needs Reinforcement    Precautions, taught by Cherie French PTA at 10/1/2024  2:26 PM.  Learner: Patient  Readiness: Eager  Method: Explanation  Response: Needs Reinforcement    Education Comments  No comments found.        OP EDUCATION:       Encounter Problems       Encounter Problems (Active)       Mobility       STG - Patient will ambulate 150 plus feet MOD I with least restrictive device (Met)       Start:  09/29/24    Expected End:  10/13/24    Resolved:  10/01/24         STG - Patient will ascend and descend a flight of stairs MOD I with 1 rail (Progressing)       Start:  09/29/24    Expected End:  10/13/24               Pain - Adult             Encounter Problems (Resolved)       PT Transfers       STG - Patient to transfer to and from sit to supine independently (Met)       Start:  09/29/24    Expected End:  10/13/24    Resolved:  10/01/24         STG - Patient will transfer sit  to and from stand MOD I with least restrictive device (Met)       Start:  09/29/24    Expected End:  10/13/24    Resolved:  10/01/24

## 2024-10-01 NOTE — PROGRESS NOTES
10/01/24 1602   Discharge Planning   Expected Discharge Disposition Home H  (Home with son and new [facility] for  SN, PT, OT, SLP)     10/1/24 @ 1603: Confirmed with son today he has guardianship over person for his father, paperwork on chart, but not for financials. Plan for patient to discharge home with son @ [phone] [facility] [id number]. Son now feels patient may be staying with him longer than 1 week and would like HHC to start at his home in [place]. Choices provided and selected [facility] for SN, PT, OT, SLP. Provider made internal referral for HHC. Confirmed with [facility] insurance verified, they can accept with SOC 24-48hrs after discharge. Attempted to find accepting [facility] for patient that services [place] ( for when he returns home alone), no accepting agencies located. Call made to patient insurance member services and was provided with [facility], [facility], [facility], and [facility] that service that zip code and are in network with insurance. However calls made to each agency and [facility] is out of service area, [facility] unable to accept, [facility] is able to provide only SN ( does not have Pt /OT/SLP), [facility] ( unable to reach office by phone), no response in [facility].

## 2024-10-02 ENCOUNTER — DOCUMENTATION (OUTPATIENT)
Dept: HOME HEALTH SERVICES | Facility: HOME HEALTH | Age: 64
End: 2024-10-02
Payer: MEDICAID

## 2024-10-02 VITALS
WEIGHT: 249.56 LBS | OXYGEN SATURATION: 93 % | TEMPERATURE: 98.1 F | SYSTOLIC BLOOD PRESSURE: 177 MMHG | DIASTOLIC BLOOD PRESSURE: 97 MMHG | HEIGHT: 72 IN | RESPIRATION RATE: 16 BRPM | BODY MASS INDEX: 33.8 KG/M2 | HEART RATE: 72 BPM

## 2024-10-02 LAB — GLUCOSE BLD MANUAL STRIP-MCNC: 94 MG/DL (ref 74–99)

## 2024-10-02 PROCEDURE — 99239 HOSP IP/OBS DSCHRG MGMT >30: CPT | Performed by: STUDENT IN AN ORGANIZED HEALTH CARE EDUCATION/TRAINING PROGRAM

## 2024-10-02 PROCEDURE — 82947 ASSAY GLUCOSE BLOOD QUANT: CPT

## 2024-10-02 PROCEDURE — 2500000001 HC RX 250 WO HCPCS SELF ADMINISTERED DRUGS (ALT 637 FOR MEDICARE OP): Performed by: FAMILY MEDICINE

## 2024-10-02 RX ADMIN — ASPIRIN 81 MG: 81 TABLET, COATED ORAL at 08:48

## 2024-10-02 RX ADMIN — LEVETIRACETAM 750 MG: 500 TABLET, FILM COATED ORAL at 08:47

## 2024-10-02 RX ADMIN — CLOPIDOGREL 75 MG: 75 TABLET ORAL at 08:48

## 2024-10-02 RX ADMIN — ALLOPURINOL 100 MG: 100 TABLET ORAL at 08:48

## 2024-10-02 ASSESSMENT — PAIN SCALES - GENERAL: PAINLEVEL_OUTOF10: 0 - NO PAIN

## 2024-10-02 NOTE — HH CARE COORDINATION
Home Care received a Referral for Nursing, Physical Therapy, Occupational Therapy, and Speech Language Pathology. We have processed the referral for a Start of Care on 10/3-10/4/24.     If you have any questions or concerns, please feel free to contact us at [phone]. Follow the prompts, enter your five digit zip code, and you will be directed to your care team on WEST 3.

## 2024-10-02 NOTE — CARE PLAN
The patient's goals for the shift include  to be cleared for discharge    The clinical goals for the shift include Remain stable without further defecits

## 2024-10-02 NOTE — DISCHARGE SUMMARY
Discharge Diagnosis  Acute stroke due to occlusion of left middle cerebral artery (Multi)    Issues Requiring Follow-Up  Post hospital follow up    Discharge Meds     Medication List      START taking these medications     clopidogrel 75 mg tablet; Commonly known as: Plavix; Take 1 tablet (75   mg) by mouth once daily.     CONTINUE taking these medications     allopurinol 100 mg tablet; Commonly known as: Zyloprim; Take 1 tablet   (100 mg) by mouth once daily.   aspirin 81 mg chewable tablet   atorvastatin 80 mg tablet; Commonly known as: Lipitor; Take 1 tablet (80   mg) by mouth once daily at bedtime.   diclofenac sodium 1 % gel; Commonly known as: Voltaren; Apply 1   Application topically 4 times a day as needed (knee pain).   levETIRAcetam 750 mg tablet; Commonly known as: Keppra; Take 1 tablet   (750 mg) by mouth 2 times a day.   lisinopril 40 mg tablet; Take 1 tablet (40 mg) by mouth once daily.   metoprolol succinate XL 50 mg 24 hr tablet; Commonly known as:   Toprol-XL; Take 1 tablet (50 mg) by mouth once daily.   sodium chloride 0.65 % nasal spray; Commonly known as: Ocean; Administer   1 spray into each nostril 4 times a day as needed for congestion.       Test Results Pending At Discharge  Pending Labs       No current pending labs.            Hospital Course   Jg Starr is a 64 y.o. male with history of seizure disorder, CVA, and ischemic cardiomyopathy who presented to the emergency room due to disturbances and expressive aphasia and was found to have large acute cortical infarction involving the medial aspect of the left temporal lobe, left parietal and left occipital lobes with associated acute thrombus involving the left MCA. Neuro was consulted and recommended Neurology advised the hemosiderin deposition is consistent with cerebral amyloid angiopathy and advised against anticoagulation in the foreseeable future further advised to continue DAPT. He has elevated trop 2/2 demand ischemia. Cardio  was consulted and meds were optimized. Pt should not drive. Pt is hemodynamically stable for discharge at this time with close outpatient follow ups. He is set up with [facility].    Pertinent Physical Exam At Time of Discharge  Physical Exam  Constitutional: Awake, alert, NAD.  Eyes: PERRLA, EOMI. No erythema or exudate. No proptosis or lid lag.   ENMT: MMM, no nasal congestion, no oral lesions, oropharynx clear without tonsillar erythema or exudate.  Head/Neck: NCAT, neck supple. Full active ROM of the neck. No thyromegaly or mass. No JVP.  Respiratory/Thorax: CTAB, no increased work of breathing, no increased respiratory effort, no wheeze, rales, or rhonchi.  Cardiovascular: Regular rate and rhythm, normal S1 and S2, no murmurs, rubs, or gallops.  Gastrointestinal: Soft, nontender to palpation, nondistended, no guarding or rebound, normoactive bowel sounds  Extremities: 2+ RPs, no cyanosis or edema  Neurological: Aox3, expressive aphasia  Lymphatic: No lymphadenopathy.  Skin: Warm and well perfused. No rash, lesions, or ecchymoses. No jaundice.     Outpatient Follow-Up  Future Appointments   Date Time Provider Department Center   10/9/2024 11:00 AM [facility] [address]   11/18/2024 11:00 AM Monica Westfall PA-C [address]   4/25/2025 10:00 AM Angelica Dalal, APRN-CNP [id number] [facility]         Jen Villa MD  Hospitalist

## 2024-10-04 NOTE — SIGNIFICANT EVENT
Follow Up Phone Call    Outgoing phone call    Spoke to: Jg Starr Relationship:self   Phone number: [phone]      Outcome: contacted patient/ family   Chief Complaint   Patient presents with    Altered Mental Status          Diagnosis:Not applicable    Unable to contact patient. Voice mailbox full.

## 2024-10-05 ENCOUNTER — APPOINTMENT (OUTPATIENT)
Dept: CARDIOLOGY | Facility: HOSPITAL | Age: 64
End: 2024-10-05
Payer: MEDICAID

## 2024-10-05 ENCOUNTER — HOSPITAL ENCOUNTER (EMERGENCY)
Facility: HOSPITAL | Age: 64
Discharge: HOME | End: 2024-10-05
Attending: EMERGENCY MEDICINE
Payer: MEDICAID

## 2024-10-05 ENCOUNTER — APPOINTMENT (OUTPATIENT)
Dept: RADIOLOGY | Facility: HOSPITAL | Age: 64
End: 2024-10-05
Payer: MEDICAID

## 2024-10-05 VITALS
HEIGHT: 72 IN | WEIGHT: 249 LBS | DIASTOLIC BLOOD PRESSURE: 63 MMHG | HEART RATE: 59 BPM | BODY MASS INDEX: 33.72 KG/M2 | OXYGEN SATURATION: 96 % | TEMPERATURE: 98.1 F | SYSTOLIC BLOOD PRESSURE: 107 MMHG | RESPIRATION RATE: 20 BRPM

## 2024-10-05 DIAGNOSIS — R40.4 TRANSIENT ALTERATION OF AWARENESS: Primary | ICD-10-CM

## 2024-10-05 LAB
ALBUMIN SERPL BCP-MCNC: 3.7 G/DL (ref 3.4–5)
ALP SERPL-CCNC: 83 U/L (ref 33–136)
ALT SERPL W P-5'-P-CCNC: 15 U/L (ref 10–52)
ANION GAP SERPL CALC-SCNC: 9 MMOL/L (ref 10–20)
APTT PPP: 28 SECONDS (ref 27–38)
AST SERPL W P-5'-P-CCNC: 16 U/L (ref 9–39)
BASOPHILS # BLD AUTO: 0.05 X10*3/UL (ref 0–0.1)
BASOPHILS NFR BLD AUTO: 0.6 %
BILIRUB SERPL-MCNC: 1.1 MG/DL (ref 0–1.2)
BUN SERPL-MCNC: 28 MG/DL (ref 6–23)
CALCIUM SERPL-MCNC: 8.2 MG/DL (ref 8.6–10.3)
CHLORIDE SERPL-SCNC: 105 MMOL/L (ref 98–107)
CO2 SERPL-SCNC: 28 MMOL/L (ref 21–32)
CREAT SERPL-MCNC: 1.07 MG/DL (ref 0.5–1.3)
EGFRCR SERPLBLD CKD-EPI 2021: 78 ML/MIN/1.73M*2
EOSINOPHIL # BLD AUTO: 0.26 X10*3/UL (ref 0–0.7)
EOSINOPHIL NFR BLD AUTO: 3.1 %
ERYTHROCYTE [DISTWIDTH] IN BLOOD BY AUTOMATED COUNT: 13.1 % (ref 11.5–14.5)
GLUCOSE SERPL-MCNC: 119 MG/DL (ref 74–99)
HCT VFR BLD AUTO: 47 % (ref 41–52)
HGB BLD-MCNC: 15.4 G/DL (ref 13.5–17.5)
IMM GRANULOCYTES # BLD AUTO: 0.03 X10*3/UL (ref 0–0.7)
IMM GRANULOCYTES NFR BLD AUTO: 0.4 % (ref 0–0.9)
INR PPP: 1.1 (ref 0.9–1.1)
LEVETIRACETAM SERPL-MCNC: 20 UG/ML (ref 10–40)
LYMPHOCYTES # BLD AUTO: 1.18 X10*3/UL (ref 1.2–4.8)
LYMPHOCYTES NFR BLD AUTO: 14.1 %
MCH RBC QN AUTO: 27.2 PG (ref 26–34)
MCHC RBC AUTO-ENTMCNC: 32.8 G/DL (ref 32–36)
MCV RBC AUTO: 83 FL (ref 80–100)
MONOCYTES # BLD AUTO: 0.72 X10*3/UL (ref 0.1–1)
MONOCYTES NFR BLD AUTO: 8.6 %
NEUTROPHILS # BLD AUTO: 6.13 X10*3/UL (ref 1.2–7.7)
NEUTROPHILS NFR BLD AUTO: 73.2 %
NRBC BLD-RTO: 0 /100 WBCS (ref 0–0)
PLATELET # BLD AUTO: 237 X10*3/UL (ref 150–450)
POTASSIUM SERPL-SCNC: 3.8 MMOL/L (ref 3.5–5.3)
PROT SERPL-MCNC: 6.7 G/DL (ref 6.4–8.2)
PROTHROMBIN TIME: 12.1 SECONDS (ref 9.8–12.8)
RBC # BLD AUTO: 5.66 X10*6/UL (ref 4.5–5.9)
SODIUM SERPL-SCNC: 138 MMOL/L (ref 136–145)
WBC # BLD AUTO: 8.4 X10*3/UL (ref 4.4–11.3)

## 2024-10-05 PROCEDURE — 93005 ELECTROCARDIOGRAM TRACING: CPT

## 2024-10-05 PROCEDURE — 80053 COMPREHEN METABOLIC PANEL: CPT

## 2024-10-05 PROCEDURE — 99285 EMERGENCY DEPT VISIT HI MDM: CPT | Mod: 25

## 2024-10-05 PROCEDURE — 80177 DRUG SCRN QUAN LEVETIRACETAM: CPT | Mod: PARLAB

## 2024-10-05 PROCEDURE — 85610 PROTHROMBIN TIME: CPT

## 2024-10-05 PROCEDURE — 85025 COMPLETE CBC W/AUTO DIFF WBC: CPT

## 2024-10-05 PROCEDURE — 70450 CT HEAD/BRAIN W/O DYE: CPT

## 2024-10-05 PROCEDURE — 70450 CT HEAD/BRAIN W/O DYE: CPT | Performed by: RADIOLOGY

## 2024-10-05 PROCEDURE — 36415 COLL VENOUS BLD VENIPUNCTURE: CPT

## 2024-10-05 NOTE — ED PROVIDER NOTES
Emergency Department Provider Note        History of Present Illness     History provided by: Patient and Family Member  Limitations to History: Dysarthria  External Records Reviewed with Brief Summary: Discharge Summary from 10/2/2024 which showed admission for left MCA stroke    HPI:  Jg Starr is a 64 y.o. male with PMHx of recent L PCA stroke with residual dysarthria (9/28/2024), seizure disorder on keppra, ischemic cardiomyopathy, who presents to ED via EMS for AMS, staring spell, and word finding difficulties.  EMS was called by patient's son.  Per EMS, family brought the patient downstairs for breakfast and the patient was having difficulty using a spoon, was staring at his bowl.  Increased difficulty with word finding, and having difficulty communicating with his son.    On arrival, patient has difficulty understanding some commands, but with coaching is able to follow them.  He is able to provide limited history. He is able to speak most sentences without hesitations but with some word-finding difficulties. Reports he is not having difficulty hearing. Reports no pain or nausea.  States he is having more difficulty understanding speech and speaking than he was yesterday, but his symptoms have been coming and going since his stroke.    On chart review, patient was seen by neurology during recent admission, who advised that imaging is most consistent with cerebral amyloid angiopathy and advised against anticoagulation, continued on DAPT.  Patient was discharged at that time with home health care including OT, PT, speech therapy.    Physical Exam   Triage vitals:  T 36.7 °C (98.1 °F)  HR 66  /67  RR 20  O2 95 %      Triage vitals reviewed.  Constitutional: Well developed adult in no acute distress, non toxic in appearance  Head: Normocephalic, atraumatic  Skin: Intact, dry. No rashes or lesions.  Eyes: Pupils are equal, reactive to light bilaterally. EOMI without nystagmus. No conjunctival  injection.  Neck: Supple. Trachea is midline.  Pulmonary: Normal work of breathing with no accessory muscle use noted.  Clear to auscultation bilaterally.   Cardiovascular: RRR. 2+ radial pulses bilaterally.   Abdomen: Soft, nondistended. Nontender to palpation.  Extremities: No gross deformities.  Moving all extremities spontaneously without difficulty.  Neuro: Awake and alert. Answers all questions appropriately. Speech is clear. Face is symmetric without facial droop and facial sensation to light touch equal bilaterally. Uvula midline. Tongue protrusion midline. Hearing intact bilaterally. Full and equal shoulder shrug and head turn against resistance. 5/5 motor strength of UEs and LEs. Sensation to light touch intact in all four extremities. Finger to nose and heel to shin intact. No pronator drift. No gait abnormalities.   Psych: Appropriate mood and affect.    NIHSS calculated and is 1 for mild dysarthria.    Medical Decision Making & ED Course   Medical Decision Makin y.o. male with PMHx of recent L PCA stroke with residual dysarthria (2024), seizure disorder on keppra, ischemic cardiomyopathy, who presents to ED via EMS for AMS, staring spell, and word finding difficulties.  On arrival, patient was afebrile and hemodynamically stable, saturating well on room air.  Exam is significant for word finding difficulties, no other focal neurologic deficits.    Son at bedside to give additional history.  He reports this morning his fiancée got the patient up and patient became unresponsive, fixated on his bowl, however unable to use his bed.  At that time family was unable to get the patient to speak.  After approximately 10 minutes of this, they called EMS.  Per family, patient has had significant confusion at home, has had memory problems.  He is not able to recall the names of any of his family members or the location of the bathroom.  They report he has some repetitive speech patterns.  Patient's son  reports that the patient has not yet taken his medications this morning, but otherwise has been taking his medications as prescribed.     CBC, CMP unremarkable.  Coags within normal limits.  Keppra level sent.  CT head with stable appearance of left PCA infarct.  No interval hemorrhage or expansion of infarct.    Given unremarkable labs, stable appearance of CT, and patient back to post-stroke baseline, I believe patient is safe for discharge at this time.  His symptoms are most likely secondary to residual deficits after his stroke.  Discussed this with family, who expressed understanding.  They are agreement with the plan for discharge.  Reviewed return precautions including altered mental status, unresponsiveness, or signs of stroke such as facial droop, weakness/numbness on one side of the body, or any new concerns.    ----    Differential diagnoses considered include but are not limited to: Residual deficits after recent stroke, hemorrhagic conversion, CVA, TIA, seizure     Social Determinants of Health which Significantly Impact Care: None identified     EKG Independent Interpretation: EKG interpreted by myself. Please see ED Course for full interpretation.    Independent Result Review and Interpretation: Relevant laboratory and radiographic results were reviewed and independently interpreted by myself.  As necessary, they are commented on in the ED Course.    Chronic conditions affecting the patient's care: As documented above in [facility]    The patient was discussed with the following consultants/services: None    Care Considerations: As documented above in [facility]    ED Course:  ED Course as of 10/05/24 1350   Sat Oct 05, 2024   1153 ECG 12 lead  EKG interpreted by me (ED resident): 65 bpm sinus rhythm with first-degree AV block.  , QTc 410, QRS 85.  No ST elevations or depressions concerning for ischemia.  There is a T wave inversion in aVL.  Compared with prior EKG on 9/28/2024, first-degree AV block and TWI  in aVL were previously present. [HH]   1234 CT head wo IV contrast  Images reviewed by me without obvious ICH, no evidence of skull fracture.  There is a large infarct that appears grossly stable from previous CTH on 9/30/24. [HH]   1259 CT head wo IV contrast  Radiology read of CT head with stable appearance of left PCA territory infarct with no interval hemorrhage or new findings [HH]      ED Course User Index  [HH] Catarina Saunders MD         Diagnoses as of 10/05/24 1350   Transient alteration of awareness     Disposition   As a result of the work-up, the patient was discharged home.  he was informed of his diagnosis and instructed to come back with any concerns or worsening of condition.  he and was agreeable to the plan as discussed above.  he was given the opportunity to ask questions.  All of the patient's questions were answered.      Patient seen and discussed with ED attending physician.    Catarina Saunders MD  Emergency Medicine     Catarina Saunders MD  Resident  10/05/24 4570

## 2024-10-05 NOTE — ED TRIAGE NOTES
Pt arrives to ED via EMS from sons home. Pt reportedly had a period of time where he was staring at his cereal bowl. Pt son sts he was yelling at him but he did not stop. Pt son sts when came to increase aphasia and pointed at his eye. EMS responded to call and sts by the time they loaded him symptoms resolved to what may be baseline. According to ems pt had a stroke on 9/28 and they believe he is at baseline but pt is poor historian. Pt was discharged home but has not been able to do speech or occupational therapy.   Pt upon arrival walked from cart into room and sat on bed. Pt follows commands but sts that he can't understand certain peoples request. Pt sts he feels okay but is poor historian. Son is reportedly POA. Pt LKW according to son was some time yesterday but baseline to prior deficits.

## 2024-10-05 NOTE — DISCHARGE INSTRUCTIONS
You were seen in the emergency department due to an episode of seizures responsiveness and difficulty speaking.  Your labs and imaging show no concerning findings.  Your CT head shows a stable appearance of your prior stroke without any bleeding.  At home, you should continue to follow-up with physical, occupational, speech therapy.  Please come back to the emergency department if you have any signs of stroke such as increasing confusion, facial droop, one-sided arm or leg weakness, inability to walk, or any new concerns.

## 2024-10-07 LAB
ATRIAL RATE: 97 BPM
P AXIS: 33 DEGREES
P OFFSET: 160 MS
P ONSET: 102 MS
PR INTERVAL: 238 MS
Q ONSET: 221 MS
QRS COUNT: 16 BEATS
QRS DURATION: 70 MS
QT INTERVAL: 346 MS
QTC CALCULATION(BAZETT): 439 MS
QTC FREDERICIA: 405 MS
R AXIS: -2 DEGREES
T AXIS: 42 DEGREES
T OFFSET: 394 MS
VENTRICULAR RATE: 97 BPM

## 2024-10-08 ENCOUNTER — HOME CARE VISIT (OUTPATIENT)
Dept: HOME HEALTH SERVICES | Facility: HOME HEALTH | Age: 64
End: 2024-10-08
Payer: MEDICAID

## 2024-10-08 PROCEDURE — G0299 HHS/HOSPICE OF RN EA 15 MIN: HCPCS

## 2024-10-09 ENCOUNTER — HOME CARE VISIT (OUTPATIENT)
Dept: HOME HEALTH SERVICES | Facility: HOME HEALTH | Age: 64
End: 2024-10-09
Payer: MEDICAID

## 2024-10-09 VITALS
HEART RATE: 64 BPM | RESPIRATION RATE: 16 BRPM | TEMPERATURE: 98 F | SYSTOLIC BLOOD PRESSURE: 128 MMHG | OXYGEN SATURATION: 90 % | DIASTOLIC BLOOD PRESSURE: 78 MMHG

## 2024-10-09 VITALS — SYSTOLIC BLOOD PRESSURE: 110 MMHG | OXYGEN SATURATION: 98 % | HEART RATE: 68 BPM | DIASTOLIC BLOOD PRESSURE: 60 MMHG

## 2024-10-09 DIAGNOSIS — I63.9 CEREBROVASCULAR ACCIDENT (CVA), UNSPECIFIED MECHANISM (MULTI): Primary | ICD-10-CM

## 2024-10-09 DIAGNOSIS — E11.9 CONTROLLED TYPE 2 DIABETES MELLITUS WITHOUT COMPLICATION, WITHOUT LONG-TERM CURRENT USE OF INSULIN (MULTI): ICD-10-CM

## 2024-10-09 PROCEDURE — G0152 HHCP-SERV OF OT,EA 15 MIN: HCPCS

## 2024-10-09 SDOH — ECONOMIC STABILITY: FOOD INSECURITY: MEALS PER DAY: 3

## 2024-10-09 ASSESSMENT — ACTIVITIES OF DAILY LIVING (ADL)
GROOMING ASSESSED: 1
USING THE TELPHONE: INDEPENDENT
ENTERING_EXITING_HOME: SUPERVISION
ORAL_CARE_CURRENT_FUNCTION: INDEPENDENT
OASIS_M1830: 03
HOUSEKEEPING ASSESSED: 1
SHOPPING: DEPENDENT
PREPARING MEALS: DEPENDENT
AMBULATION ASSISTANCE: 1
BATHING_CURRENT_FUNCTION: CONTACT GUARD ASSIST
CURRENT_FUNCTION: SUPERVISION
AMBULATION ASSISTANCE: SUPERVISION
PREPARING MEALS: NEEDS ASSISTANCE
DRESSING_LB_CURRENT_FUNCTION: STAND BY ASSIST
TOILETING: 1
DRESSING_LB_CURRENT_FUNCTION: SUPERVISION
TOILETING: SUPERVISION
GROOMING_CURRENT_FUNCTION: MINIMUM ASSIST
LAUNDRY ASSESSED: 1
ORAL_CARE_ASSESSED: 1
TELEPHONE USE ASSESSED: 1
BATHING ASSESSED: 1
PHYSICAL TRANSFERS ASSESSED: 1
TRANSPORTATION ASSESSED: 1
LAUNDRY: DEPENDENT
FEEDING ASSESSED: 1
BATHING ASSESSED: 1
LIGHT HOUSEKEEPING: DEPENDENT
BATHING_CURRENT_FUNCTION: MINIMUM ASSIST
DRESSING_UB_CURRENT_FUNCTION: SUPERVISION
TOILETING: 1
DRESSING_UB_CURRENT_FUNCTION: STAND BY ASSIST
TOILETING: STAND BY ASSIST
FEEDING: INDEPENDENT
SHOPPING ASSESSED: 1
TRANSPORTATION: DEPENDENT

## 2024-10-09 ASSESSMENT — PAIN SCALES - PAIN ASSESSMENT IN ADVANCED DEMENTIA (PAINAD)
CONSOLABILITY: 0 - NO NEED TO CONSOLE.
NEGVOCALIZATION: 0 - NONE.
FACIALEXPRESSION: 0
BREATHING: 0
NEGVOCALIZATION: 0
CONSOLABILITY: 0
BODYLANGUAGE: 0
BODYLANGUAGE: 0 - RELAXED.
TOTALSCORE: 0
FACIALEXPRESSION: 0 - SMILING OR INEXPRESSIVE.

## 2024-10-09 ASSESSMENT — ENCOUNTER SYMPTOMS
OCCASIONAL FEELINGS OF UNSTEADINESS: 1
STOOL FREQUENCY: DAILY
DENIES PAIN: 1
LAST BOWEL MOVEMENT: 67121
LOSS OF VISUAL FIELD: 1
DENIES PAIN: 1
PERSON REPORTING PAIN: PATIENT
PERSON REPORTING PAIN: PATIENT
LOSS OF SENSATION IN FEET: 0
BLURRED VISION: 1
DEPRESSION: 0
APPETITE LEVEL: GOOD
CHANGE IN APPETITE: UNCHANGED
BOWEL PATTERN NORMAL: 1

## 2024-10-09 NOTE — HOME HEALTH
"SOC complete. VSS. Pt has h/o recent CVA  and seizure. Pts family states he has these \"locked out\" episodes where he cant respond to family talking to him. Pt went to ED after one of these episodes, but it was unable to be determined what was causing these episodes. Pt has expressive and receptive aphasia, but is oriented x4."

## 2024-10-10 ENCOUNTER — HOME CARE VISIT (OUTPATIENT)
Dept: HOME HEALTH SERVICES | Facility: HOME HEALTH | Age: 64
End: 2024-10-10
Payer: MEDICAID

## 2024-10-10 VITALS — DIASTOLIC BLOOD PRESSURE: 80 MMHG | SYSTOLIC BLOOD PRESSURE: 142 MMHG

## 2024-10-10 PROCEDURE — G0151 HHCP-SERV OF PT,EA 15 MIN: HCPCS

## 2024-10-10 ASSESSMENT — GAIT ASSESSMENTS
WALKING STANCE: 1 - HEELS ALMOST TOUCHING WHILE WALKING
STEP CONTINUITY: 1 - STEPS APPEAR CONTINUOUS
PATH SCORE: 2
TRUNK: 2 - NO SWAY, NO FLEXION, NO USE OF ARMS, NO WALKING AID
GAIT SCORE: 12
STEP SYMMETRY: 1 - RIGHT AND LEFT STEP LENGTH APPEAR EQUAL
BALANCE AND GAIT SCORE: 27
PATH: 2 - STRAIGHT WITHOUT WALKING AID
TRUNK SCORE: 2
INITIATION OF GAIT IMMEDIATELY AFTER GO: 1 - NO HESITANCY

## 2024-10-10 ASSESSMENT — BALANCE ASSESSMENTS
STANDING BALANCE: 2 - NARROW STANCE WITHOUT SUPPORT
ARISES: 1 - ABLE, USES ARMS TO HELP
BALANCE SCORE: 15
SITTING DOWN: 2 - SAFE, SMOOTH MOTION
IMMEDIATE STANDING BALANCE FIRST 5 SECONDS: 2 - STEADY WITHOUT WALKER OR OTHER SUPPORT
ATTEMPTS TO ARISE: 2 - ABLE TO RISE, ONE ATTEMPT
EYES CLOSED AT MAXIMUM POSITION NUDGED: 1 - STEADY
SITTING BALANCE: 1 - STEADY, SAFE
NUDGED: 2 - STEADY
TURNING 360 DEGREES STEPS: 1 - CONTINUOUS STEPS
NUDGED SCORE: 2
ARISING SCORE: 1

## 2024-10-10 ASSESSMENT — ACTIVITIES OF DAILY LIVING (ADL): AMBULATION ASSISTANCE ON FLAT SURFACES: 1

## 2024-10-10 ASSESSMENT — ENCOUNTER SYMPTOMS
DENIES PAIN: 1
OCCASIONAL FEELINGS OF UNSTEADINESS: 0
PERSON REPORTING PAIN: PATIENT

## 2024-10-13 LAB
ATRIAL RATE: 65 BPM
P AXIS: -29 DEGREES
PR INTERVAL: 238 MS
Q ONSET: 249 MS
QRS COUNT: 11 BEATS
QRS DURATION: 85 MS
QT INTERVAL: 394 MS
QTC CALCULATION(BAZETT): 410 MS
QTC FREDERICIA: 404 MS
R AXIS: 4 DEGREES
T AXIS: 68 DEGREES
T OFFSET: 446 MS
VENTRICULAR RATE: 65 BPM

## 2024-10-14 ENCOUNTER — HOME CARE VISIT (OUTPATIENT)
Dept: HOME HEALTH SERVICES | Facility: HOME HEALTH | Age: 64
End: 2024-10-14
Payer: MEDICAID

## 2024-10-14 VITALS — HEART RATE: 66 BPM | OXYGEN SATURATION: 90 %

## 2024-10-14 PROCEDURE — G0153 HHCP-SVS OF S/L PATH,EA 15MN: HCPCS

## 2024-10-14 ASSESSMENT — ENCOUNTER SYMPTOMS
PERSON REPORTING PAIN: PATIENT
DENIES PAIN: 1
AGGRESSION WITHIN DEFINED LIMITS: 1

## 2024-10-14 ASSESSMENT — PAIN SCALES - PAIN ASSESSMENT IN ADVANCED DEMENTIA (PAINAD)
NEGVOCALIZATION: 0
BREATHING: 0
NEGVOCALIZATION: 0 - NONE.
FACIALEXPRESSION: 0 - SMILING OR INEXPRESSIVE.
TOTALSCORE: 0
CONSOLABILITY: 0
BODYLANGUAGE: 0
FACIALEXPRESSION: 0
CONSOLABILITY: 0 - NO NEED TO CONSOLE.
BODYLANGUAGE: 0 - RELAXED.

## 2024-10-16 ENCOUNTER — HOME CARE VISIT (OUTPATIENT)
Dept: HOME HEALTH SERVICES | Facility: HOME HEALTH | Age: 64
End: 2024-10-16
Payer: MEDICAID

## 2024-10-16 VITALS — SYSTOLIC BLOOD PRESSURE: 120 MMHG | DIASTOLIC BLOOD PRESSURE: 70 MMHG | HEART RATE: 65 BPM | OXYGEN SATURATION: 95 %

## 2024-10-16 PROCEDURE — G0152 HHCP-SERV OF OT,EA 15 MIN: HCPCS

## 2024-10-16 ASSESSMENT — ENCOUNTER SYMPTOMS
PERSON REPORTING PAIN: PATIENT
DENIES PAIN: 1

## 2024-10-17 ENCOUNTER — HOME CARE VISIT (OUTPATIENT)
Dept: HOME HEALTH SERVICES | Facility: HOME HEALTH | Age: 64
End: 2024-10-17
Payer: MEDICAID

## 2024-10-17 VITALS
DIASTOLIC BLOOD PRESSURE: 62 MMHG | OXYGEN SATURATION: 96 % | HEART RATE: 74 BPM | RESPIRATION RATE: 17 BRPM | SYSTOLIC BLOOD PRESSURE: 108 MMHG | TEMPERATURE: 97.5 F

## 2024-10-17 PROCEDURE — G0299 HHS/HOSPICE OF RN EA 15 MIN: HCPCS

## 2024-10-17 SDOH — ECONOMIC STABILITY: FOOD INSECURITY: MEALS PER DAY: 2

## 2024-10-17 SDOH — ECONOMIC STABILITY: GENERAL

## 2024-10-17 ASSESSMENT — ENCOUNTER SYMPTOMS
LOSS OF VISUAL FIELD: 1
BLURRED VISION: 1
CHANGE IN APPETITE: UNCHANGED
OCCASIONAL FEELINGS OF UNSTEADINESS: 1
LOSS OF SENSATION IN FEET: 0
PERSON REPORTING PAIN: PATIENT
DENIES PAIN: 1
DEPRESSION: 0
APPETITE LEVEL: GOOD

## 2024-10-17 ASSESSMENT — PAIN SCALES - PAIN ASSESSMENT IN ADVANCED DEMENTIA (PAINAD)
NEGVOCALIZATION: 0 - NONE.
TOTALSCORE: 0
FACIALEXPRESSION: 0
BODYLANGUAGE: 0 - RELAXED.
BREATHING: 0
CONSOLABILITY: 0
FACIALEXPRESSION: 0 - SMILING OR INEXPRESSIVE.
NEGVOCALIZATION: 0
BODYLANGUAGE: 0
CONSOLABILITY: 0 - NO NEED TO CONSOLE.

## 2024-10-17 ASSESSMENT — ACTIVITIES OF DAILY LIVING (ADL): MONEY MANAGEMENT (EXPENSES/BILLS): NEEDS ASSISTANCE

## 2024-10-22 ENCOUNTER — HOME CARE VISIT (OUTPATIENT)
Dept: HOME HEALTH SERVICES | Facility: HOME HEALTH | Age: 64
End: 2024-10-22

## 2024-10-24 ENCOUNTER — HOME CARE VISIT (OUTPATIENT)
Dept: HOME HEALTH SERVICES | Facility: HOME HEALTH | Age: 64
End: 2024-10-24
Payer: MEDICAID

## 2024-10-24 ENCOUNTER — HOME CARE VISIT (OUTPATIENT)
Dept: HOME HEALTH SERVICES | Facility: HOME HEALTH | Age: 64
End: 2024-10-24

## 2024-10-24 PROCEDURE — G0300 HHS/HOSPICE OF LPN EA 15 MIN: HCPCS

## 2024-10-24 SDOH — ECONOMIC STABILITY: GENERAL

## 2024-10-24 ASSESSMENT — ENCOUNTER SYMPTOMS
DENIES PAIN: 1
LAST BOWEL MOVEMENT: 67137
APPETITE LEVEL: GOOD

## 2024-10-24 ASSESSMENT — ACTIVITIES OF DAILY LIVING (ADL): MONEY MANAGEMENT (EXPENSES/BILLS): NEEDS ASSISTANCE

## 2024-10-29 ENCOUNTER — HOME CARE VISIT (OUTPATIENT)
Dept: HOME HEALTH SERVICES | Facility: HOME HEALTH | Age: 64
End: 2024-10-29
Payer: MEDICAID

## 2024-10-30 SDOH — HEALTH STABILITY: MENTAL HEALTH: DRINKING ASSESSMENT COMMENTS: UNABLE TO ASSESS PATIENT WAS SLEEPING DURING THE VISIT.

## 2024-10-30 ASSESSMENT — ACTIVITIES OF DAILY LIVING (ADL)
LAUNDRY_REQUIRES_ASSISTANCE: 1
SHOPPING_REQUIRES_ASSISTANCE: 1

## 2024-10-31 ENCOUNTER — HOME CARE VISIT (OUTPATIENT)
Dept: HOME HEALTH SERVICES | Facility: HOME HEALTH | Age: 64
End: 2024-10-31
Payer: MEDICAID

## 2024-10-31 VITALS — HEART RATE: 72 BPM | OXYGEN SATURATION: 93 %

## 2024-10-31 PROCEDURE — G0153 HHCP-SVS OF S/L PATH,EA 15MN: HCPCS

## 2024-10-31 ASSESSMENT — PAIN SCALES - PAIN ASSESSMENT IN ADVANCED DEMENTIA (PAINAD)
NEGVOCALIZATION: 0 - NONE.
BODYLANGUAGE: 0
BREATHING: 0
CONSOLABILITY: 0 - NO NEED TO CONSOLE.
TOTALSCORE: 0
BODYLANGUAGE: 0 - RELAXED.
FACIALEXPRESSION: 0 - SMILING OR INEXPRESSIVE.
CONSOLABILITY: 0
FACIALEXPRESSION: 0
NEGVOCALIZATION: 0

## 2024-10-31 ASSESSMENT — ENCOUNTER SYMPTOMS
PERSON REPORTING PAIN: PATIENT
DENIES PAIN: 1

## 2024-11-01 ENCOUNTER — HOME CARE VISIT (OUTPATIENT)
Dept: HOME HEALTH SERVICES | Facility: HOME HEALTH | Age: 64
End: 2024-11-01
Payer: MEDICAID

## 2024-11-01 VITALS
HEART RATE: 53 BPM | TEMPERATURE: 97.8 F | SYSTOLIC BLOOD PRESSURE: 132 MMHG | OXYGEN SATURATION: 97 % | DIASTOLIC BLOOD PRESSURE: 70 MMHG | RESPIRATION RATE: 18 BRPM

## 2024-11-01 PROCEDURE — G0299 HHS/HOSPICE OF RN EA 15 MIN: HCPCS

## 2024-11-01 ASSESSMENT — ACTIVITIES OF DAILY LIVING (ADL)
OASIS_M1830: 00
HOME_HEALTH_OASIS: 00

## 2024-11-01 ASSESSMENT — ENCOUNTER SYMPTOMS
LAST BOWEL MOVEMENT: 67145
CHANGE IN APPETITE: UNCHANGED
APPETITE LEVEL: GOOD
DENIES PAIN: 1

## 2024-11-07 DIAGNOSIS — I63.512: ICD-10-CM

## 2024-11-07 RX ORDER — CLOPIDOGREL BISULFATE 75 MG/1
75 TABLET ORAL DAILY
Qty: 30 TABLET | Refills: 0 | Status: SHIPPED | OUTPATIENT
Start: 2024-11-07

## 2024-12-12 DIAGNOSIS — I63.512: ICD-10-CM

## 2024-12-15 DIAGNOSIS — I63.512: ICD-10-CM

## 2024-12-16 RX ORDER — CLOPIDOGREL BISULFATE 75 MG/1
75 TABLET ORAL DAILY
Qty: 30 TABLET | Refills: 0 | OUTPATIENT
Start: 2024-12-16

## 2024-12-16 RX ORDER — CLOPIDOGREL BISULFATE 75 MG/1
75 TABLET ORAL DAILY
Qty: 30 TABLET | Refills: 1 | Status: SHIPPED | OUTPATIENT
Start: 2024-12-16

## 2025-01-21 ENCOUNTER — APPOINTMENT (OUTPATIENT)
Dept: NEUROLOGY | Facility: HOSPITAL | Age: 65
End: 2025-01-21
Payer: MEDICAID

## 2025-01-31 ENCOUNTER — APPOINTMENT (OUTPATIENT)
Dept: PRIMARY CARE | Facility: CLINIC | Age: 65
End: 2025-01-31
Payer: MEDICAID

## 2025-02-07 ENCOUNTER — APPOINTMENT (OUTPATIENT)
Dept: PRIMARY CARE | Facility: CLINIC | Age: 65
End: 2025-02-07
Payer: MEDICAID

## 2025-02-07 VITALS
TEMPERATURE: 97.7 F | SYSTOLIC BLOOD PRESSURE: 140 MMHG | BODY MASS INDEX: 29.48 KG/M2 | DIASTOLIC BLOOD PRESSURE: 71 MMHG | WEIGHT: 217.4 LBS

## 2025-02-07 DIAGNOSIS — Z98.61 CAD S/P PERCUTANEOUS CORONARY ANGIOPLASTY: Primary | ICD-10-CM

## 2025-02-07 DIAGNOSIS — Z12.5 PROSTATE CANCER SCREENING: ICD-10-CM

## 2025-02-07 DIAGNOSIS — I10 BENIGN ESSENTIAL HTN: ICD-10-CM

## 2025-02-07 DIAGNOSIS — I69.328 SPEECH AND LANGUAGE DEFICIT AS LATE EFFECT OF CEREBROVASCULAR ACCIDENT (CVA): ICD-10-CM

## 2025-02-07 DIAGNOSIS — I25.10 CAD S/P PERCUTANEOUS CORONARY ANGIOPLASTY: Primary | ICD-10-CM

## 2025-02-07 PROCEDURE — 3078F DIAST BP <80 MM HG: CPT | Performed by: FAMILY MEDICINE

## 2025-02-07 PROCEDURE — 4010F ACE/ARB THERAPY RXD/TAKEN: CPT | Performed by: FAMILY MEDICINE

## 2025-02-07 PROCEDURE — 99214 OFFICE O/P EST MOD 30 MIN: CPT | Performed by: FAMILY MEDICINE

## 2025-02-07 PROCEDURE — 3077F SYST BP >= 140 MM HG: CPT | Performed by: FAMILY MEDICINE

## 2025-02-07 NOTE — PROGRESS NOTES
Subjective   Patient ID: Jg Starr is a 64 y.o. male who presents for New Patient Visit.    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 Helps with chores: washes dishes.  Denies episodes of cough with eating/choking.  Walks with no support.  IADL: dependent: stopped driving due to double vision.    Review of Systems   All other systems reviewed and are negative.      Objective   /71 (BP Location: Left arm, Patient Position: Sitting)   Temp 36.5 °C (97.7 °F)   Wt 98.6 kg (217 lb 6.4 oz)   BMI 29.48 kg/m²     Physical Exam  Vitals and nursing note reviewed. Exam conducted with a chaperone present (Son).   Cardiovascular:      Rate and Rhythm: Normal rate and regular rhythm.   Pulmonary:      Effort: Pulmonary effort is normal.      Breath sounds: Normal breath sounds.   Musculoskeletal:      Cervical back: Neck supple.   Lymphadenopathy:      Cervical: No cervical adenopathy.   Neurological:      Mental Status: He is alert.      Motor: No weakness.      Gait: Gait normal.   Psychiatric:         Mood and Affect: Mood normal.         Behavior: Behavior normal.         Thought Content: Thought content normal.         Judgment: Judgment normal.         Assessment/Plan   Diagnoses and all orders for this visit:  CAD S/P percutaneous coronary angioplasty  -     CBC and Auto Differential; Future  -     Comprehensive Metabolic Panel; Future  -     Lipid panel; Future  -     TSH; Future  -     Vitamin D 25-Hydroxy,Total (for eval of Vitamin D levels); Future  -     Methylmalonic acid, serum; Future  -     PSA; Future  Benign essential HTN  -     CBC and Auto Differential; Future  -     Comprehensive Metabolic Panel; Future  -     Lipid panel; Future  -     TSH; Future  -     Vitamin D 25-Hydroxy,Total (for eval of Vitamin D levels); Future  -      Methylmalonic acid, serum; Future  -     PSA; Future  Speech and language deficit as late effect of cerebrovascular accident (CVA)  -     CBC and Auto Differential; Future  -     Comprehensive Metabolic Panel; Future  -     Lipid panel; Future  -     TSH; Future  -     Vitamin D 25-Hydroxy,Total (for eval of Vitamin D levels); Future  -     Methylmalonic acid, serum; Future  -     Referral to Neurology; Future  -     PSA; Future  Prostate cancer screening  -     PSA; Future    Keppra was stopped when he was hospitilized in October. Lost follow up with neurology and cardiology.

## 2025-03-14 ENCOUNTER — APPOINTMENT (OUTPATIENT)
Dept: PRIMARY CARE | Facility: CLINIC | Age: 65
End: 2025-03-14
Payer: MEDICAID

## 2025-03-14 VITALS
DIASTOLIC BLOOD PRESSURE: 73 MMHG | HEART RATE: 67 BPM | BODY MASS INDEX: 28.75 KG/M2 | OXYGEN SATURATION: 95 % | RESPIRATION RATE: 17 BRPM | TEMPERATURE: 97.3 F | WEIGHT: 212 LBS | SYSTOLIC BLOOD PRESSURE: 136 MMHG

## 2025-03-14 DIAGNOSIS — I11.0 HYPERTENSIVE HEART DISEASE WITH HEART FAILURE: ICD-10-CM

## 2025-03-14 DIAGNOSIS — E85.4 ORGAN-LIMITED AMYLOIDOSIS: Primary | ICD-10-CM

## 2025-03-14 DIAGNOSIS — M10.9 GOUT OF MULTIPLE SITES, UNSPECIFIED CAUSE, UNSPECIFIED CHRONICITY: ICD-10-CM

## 2025-03-14 DIAGNOSIS — I63.512: ICD-10-CM

## 2025-03-14 DIAGNOSIS — I10 BENIGN ESSENTIAL HTN: ICD-10-CM

## 2025-03-14 DIAGNOSIS — M10.9 GOUT OF KNEE, UNSPECIFIED CAUSE, UNSPECIFIED CHRONICITY, UNSPECIFIED LATERALITY: ICD-10-CM

## 2025-03-14 PROBLEM — J96.92: Status: ACTIVE | Noted: 2025-03-14

## 2025-03-14 PROBLEM — E11.9 TYPE 2 DIABETES MELLITUS WITHOUT COMPLICATIONS (MULTI): Status: ACTIVE | Noted: 2023-12-19

## 2025-03-14 PROBLEM — R41.841 COGNITIVE COMMUNICATION DEFICIT: Status: ACTIVE | Noted: 2023-12-19

## 2025-03-14 PROCEDURE — 3075F SYST BP GE 130 - 139MM HG: CPT | Performed by: FAMILY MEDICINE

## 2025-03-14 PROCEDURE — 99213 OFFICE O/P EST LOW 20 MIN: CPT | Performed by: FAMILY MEDICINE

## 2025-03-14 PROCEDURE — 4010F ACE/ARB THERAPY RXD/TAKEN: CPT | Performed by: FAMILY MEDICINE

## 2025-03-14 PROCEDURE — 3078F DIAST BP <80 MM HG: CPT | Performed by: FAMILY MEDICINE

## 2025-03-14 RX ORDER — ALLOPURINOL 100 MG/1
100 TABLET ORAL DAILY
Qty: 90 TABLET | Refills: 3 | Status: SHIPPED | OUTPATIENT
Start: 2025-03-14

## 2025-03-14 RX ORDER — ATORVASTATIN CALCIUM 80 MG/1
80 TABLET, FILM COATED ORAL NIGHTLY
Qty: 90 TABLET | Refills: 3 | Status: SHIPPED | OUTPATIENT
Start: 2025-03-14

## 2025-03-14 RX ORDER — CLOPIDOGREL BISULFATE 75 MG/1
75 TABLET ORAL DAILY
Qty: 30 TABLET | Refills: 1 | Status: SHIPPED | OUTPATIENT
Start: 2025-03-14

## 2025-03-14 RX ORDER — LISINOPRIL 40 MG/1
40 TABLET ORAL DAILY
Qty: 90 TABLET | Refills: 3 | Status: SHIPPED | OUTPATIENT
Start: 2025-03-14

## 2025-03-14 ASSESSMENT — PAIN SCALES - GENERAL: PAINLEVEL_OUTOF10: 0-NO PAIN

## 2025-03-15 NOTE — PROGRESS NOTES
Subjective   Patient ID: Jg Starr is a 64 y.o. male who presents for No chief complaint on file..    HPI     1 month follow up.  Residual symptoms from CVA: resolved.    Did not schedule with neurology.  Son : refill on Allopurinol and Plavix.    Review of Systems   All other systems reviewed and are negative.      Objective   /73 (BP Location: Left arm, Patient Position: Sitting, BP Cuff Size: Adult)   Pulse 67   Temp 36.3 °C (97.3 °F) (Temporal)   Resp 17   Wt 96.2 kg (212 lb)   SpO2 95%   BMI 28.75 kg/m²     Physical Exam  Vitals and nursing note reviewed. Exam conducted with a chaperone present (Son).   Cardiovascular:      Rate and Rhythm: Normal rate and regular rhythm.   Pulmonary:      Effort: Pulmonary effort is normal.      Breath sounds: Normal breath sounds.   Musculoskeletal:      Cervical back: Neck supple.   Lymphadenopathy:      Cervical: No cervical adenopathy.   Neurological:      Mental Status: He is alert.         Assessment/Plan   Diagnoses and all orders for this visit:  Organ-limited amyloidosis  -     Follow Up In Primary Care - Established; Future  -     Referral to Cardiology; Future  Hypertensive heart disease with heart failure  -     Follow Up In Primary Care - Established; Future  -     Referral to Cardiology; Future  Gout of multiple sites, unspecified cause, unspecified chronicity  -     allopurinol (Zyloprim) 100 mg tablet; Take 1 tablet (100 mg) by mouth once daily.  Benign essential HTN  -     lisinopril 40 mg tablet; Take 1 tablet (40 mg) by mouth once daily.  -     atorvastatin (Lipitor) 80 mg tablet; Take 1 tablet (80 mg) by mouth once daily at bedtime.  Gout of knee, unspecified cause, unspecified chronicity, unspecified laterality  -     atorvastatin (Lipitor) 80 mg tablet; Take 1 tablet (80 mg) by mouth once daily at bedtime.  Acute stroke due to occlusion of left middle cerebral artery (Multi)  -     clopidogrel (Plavix) 75 mg tablet; Take 1 tablet (75  mg) by mouth once daily.

## 2025-04-25 ENCOUNTER — APPOINTMENT (OUTPATIENT)
Dept: PRIMARY CARE | Facility: CLINIC | Age: 65
End: 2025-04-25
Payer: MEDICAID

## 2025-05-04 DIAGNOSIS — I63.512: ICD-10-CM

## 2025-05-05 RX ORDER — CLOPIDOGREL BISULFATE 75 MG/1
75 TABLET ORAL DAILY
Qty: 30 TABLET | Refills: 1 | Status: SHIPPED | OUTPATIENT
Start: 2025-05-05

## 2025-05-28 DIAGNOSIS — I10 BENIGN ESSENTIAL HTN: ICD-10-CM

## 2025-05-28 DIAGNOSIS — M10.9 GOUT OF KNEE, UNSPECIFIED CAUSE, UNSPECIFIED CHRONICITY, UNSPECIFIED LATERALITY: ICD-10-CM

## 2025-05-28 RX ORDER — METOPROLOL SUCCINATE 50 MG/1
50 TABLET, EXTENDED RELEASE ORAL DAILY
Qty: 90 TABLET | Refills: 2 | Status: SHIPPED | OUTPATIENT
Start: 2025-05-28

## 2025-07-18 DIAGNOSIS — I63.512: ICD-10-CM

## 2025-07-18 DIAGNOSIS — R56.9 SEIZURE (MULTI): Primary | ICD-10-CM

## 2025-07-18 RX ORDER — CLOPIDOGREL BISULFATE 75 MG/1
75 TABLET ORAL DAILY
Qty: 90 TABLET | Refills: 2 | Status: SHIPPED | OUTPATIENT
Start: 2025-07-18 | End: 2026-07-18

## 2025-07-18 RX ORDER — LEVETIRACETAM 750 MG/1
750 TABLET ORAL
Qty: 180 TABLET | Refills: 2 | Status: SHIPPED | OUTPATIENT
Start: 2025-07-18

## 2025-09-19 ENCOUNTER — APPOINTMENT (OUTPATIENT)
Dept: PRIMARY CARE | Facility: CLINIC | Age: 65
End: 2025-09-19
Payer: MEDICAID